# Patient Record
Sex: MALE | Race: WHITE | NOT HISPANIC OR LATINO | Employment: OTHER | URBAN - METROPOLITAN AREA
[De-identification: names, ages, dates, MRNs, and addresses within clinical notes are randomized per-mention and may not be internally consistent; named-entity substitution may affect disease eponyms.]

---

## 2017-01-12 ENCOUNTER — TELEPHONE (OUTPATIENT)
Dept: INTERNAL MEDICINE | Facility: CLINIC | Age: 62
End: 2017-01-12

## 2017-01-12 RX ORDER — ALLOPURINOL 100 MG/1
100 TABLET ORAL DAILY
Qty: 30 TABLET | Refills: 0 | Status: SHIPPED | OUTPATIENT
Start: 2017-01-12 | End: 2017-02-20 | Stop reason: SDUPTHER

## 2017-01-12 RX ORDER — AMLODIPINE BESYLATE 5 MG/1
5 TABLET ORAL DAILY
Qty: 90 TABLET | Refills: 1 | Status: SHIPPED | OUTPATIENT
Start: 2017-01-12 | End: 2017-07-20

## 2017-01-12 NOTE — TELEPHONE ENCOUNTER
----- Message from Anne Kuhn sent at 1/12/2017  2:13 PM EST -----  Contact: ELISABETH SPENCER PH:[phone]  ELISABETH SPENCER CALLING FOR A REFILL FOR ALLOPURINOL 100 MG  HE USES THE [facility]. HE CAN BE REACHED -[phone]

## 2017-02-20 NOTE — TELEPHONE ENCOUNTER
----- Message from Stephanie Gallo sent at 2/20/2017  4:02 PM EST -----  PATIENT WOULD LIKE TO CHANGE FROM VENLOFAXINE EXTENDED RELEASE TO REGULAR      PLEASE CALL PATIENT TO DISCUSS [phone]

## 2017-02-21 RX ORDER — ALLOPURINOL 100 MG/1
100 TABLET ORAL DAILY
Qty: 30 TABLET | Refills: 0 | Status: SHIPPED | OUTPATIENT
Start: 2017-02-21 | End: 2017-07-10 | Stop reason: SDUPTHER

## 2017-02-21 RX ORDER — VENLAFAXINE 75 MG/1
37.5 TABLET ORAL 2 TIMES DAILY
Qty: 30 TABLET | Refills: 0 | Status: SHIPPED | OUTPATIENT
Start: 2017-02-21 | End: 2017-03-10 | Stop reason: SDUPTHER

## 2017-02-21 NOTE — TELEPHONE ENCOUNTER
To be on regular effexor, he would take 1/2 tab BID.  I have sent that in along with allopurinol.  He has appt with [facility] on 3/10 where he can get further refills.

## 2017-02-27 ENCOUNTER — OFFICE VISIT (OUTPATIENT)
Dept: ENDOCRINOLOGY | Facility: CLINIC | Age: 62
End: 2017-02-27

## 2017-02-27 VITALS
OXYGEN SATURATION: 98 % | WEIGHT: 315 LBS | SYSTOLIC BLOOD PRESSURE: 154 MMHG | DIASTOLIC BLOOD PRESSURE: 80 MMHG | HEIGHT: 72 IN | HEART RATE: 57 BPM | BODY MASS INDEX: 42.66 KG/M2

## 2017-02-27 DIAGNOSIS — E11.9 TYPE 2 DIABETES MELLITUS WITHOUT COMPLICATION, WITH LONG-TERM CURRENT USE OF INSULIN (HCC): Primary | ICD-10-CM

## 2017-02-27 DIAGNOSIS — Z79.4 TYPE 2 DIABETES MELLITUS WITHOUT COMPLICATION, WITH LONG-TERM CURRENT USE OF INSULIN (HCC): Primary | ICD-10-CM

## 2017-02-27 DIAGNOSIS — I10 BENIGN ESSENTIAL HYPERTENSION: ICD-10-CM

## 2017-02-27 DIAGNOSIS — E78.2 MIXED HYPERLIPIDEMIA: ICD-10-CM

## 2017-02-27 DIAGNOSIS — E03.9 ACQUIRED HYPOTHYROIDISM: ICD-10-CM

## 2017-02-27 LAB
GLUCOSE BLDC GLUCOMTR-MCNC: 150 MG/DL (ref 70–130)
HBA1C MFR BLD: 6.8 %

## 2017-02-27 PROCEDURE — 82962 GLUCOSE BLOOD TEST: CPT | Performed by: INTERNAL MEDICINE

## 2017-02-27 PROCEDURE — 99214 OFFICE O/P EST MOD 30 MIN: CPT | Performed by: INTERNAL MEDICINE

## 2017-02-27 PROCEDURE — 83036 HEMOGLOBIN GLYCOSYLATED A1C: CPT | Performed by: INTERNAL MEDICINE

## 2017-02-27 NOTE — ASSESSMENT & PLAN NOTE
Blood sugar and 90 day average sugar reviewed  Results for orders placed or performed in visit on 02/27/17   POC Glycosylated Hemoglobin (Hb A1C)   Result Value Ref Range    Hemoglobin A1C 6.8 %   POC Glucose Fingerstick   Result Value Ref Range    Glucose 150 (A) 70 - 130 mg/dL     Is dieting and exercising  Goals reviewed  Is up do date with eye exam  Neuropathy stable- high risk due to fungal nail, deformity feet with callus left medial toe   Adjustment of insulin for lower carb diet discussed  Is on lantus 50 u hs  Discussed sugars - no low sugars  Has lost about 14 lbs and commended this effort   Discussed adjustment of premeal insulin for immediately lower carb meal  Discussed gradual reduction of lantus based on fasting sugar and weight loss  Discussed testing sugar when he feels sweaty   Recheck 3-4 months

## 2017-02-27 NOTE — ASSESSMENT & PLAN NOTE
bp is high- have patient check bp at home and let us know if over 145/85  Is on arb   Has not had medication this morning

## 2017-02-27 NOTE — PROGRESS NOTES
Kevin Aguero 61 y.o.  CC:Follow-up; Diabetes (Type Ii); Hypertension; and Hypothyroidism    Platinum: Follow-up; Diabetes (Type Ii); Hypertension; and Hypothyroidism    Blood sugar and 90 day average sugar reviewed  Results for orders placed or performed in visit on 02/27/17   POC Glycosylated Hemoglobin (Hb A1C)   Result Value Ref Range    Hemoglobin A1C 6.8 %   POC Glucose Fingerstick   Result Value Ref Range    Glucose 150 (A) 70 - 130 mg/dL     He is up to date with eye exam- has f/u scheduled  Ur alb neg 12/16  Blood sugars higher after valentines day  He is moving ahead with plans for bariatric surgery- is on preprocedure diet  Sugars have been lower overall  bp high- recheck 160/78  Eyes more red off and on- injected (irritants from dust in shop)  Neuropathy stable- fungal nail infection (left gt toe callus and bilateral foot deformity)  Ur alb neg 12/16  Is doing much better with diet - has lost about 14 lbs in 2 months  Is exercising more (3 days a week) and is working up to 5 days a week (30-45 min)  Still seeing nephrology and they are monitoring him (sees them at the end of March)  Has PE 3/10 with Dr Gates     Allergies   Allergen Reactions   • Sulfa Antibiotics        Current Outpatient Prescriptions:   •  allopurinol (ZYLOPRIM) 100 MG tablet, Take 1 tablet by mouth Daily. For: Gout, Disp: 30 tablet, Rfl: 0  •  amLODIPine (NORVASC) 5 MG tablet, Take 1 tablet by mouth Daily., Disp: 90 tablet, Rfl: 1  •  buPROPion XL (WELLBUTRIN XL) 300 MG 24 hr tablet, Take 1 tablet by mouth daily. as directed; For: Depression, Disp: , Rfl:   •  BYSTOLIC 10 MG tablet, TAKE 1 TABLET DAILY, Disp: 90 tablet, Rfl: 3  •  Cholecalciferol (VITAMIN D3) 2000 UNITS capsule, Take 1 capsule by mouth 3 (Three) Times a Day., Disp: , Rfl:   •  clotrimazole-betamethasone (LOTRISONE) 1-0.05 % cream, Apply  topically 2 (two) times a day., Disp: 45 g, Rfl: 2  •  coenzyme Q10 100 MG capsule, Take 100 mg by mouth daily., Disp: , Rfl:   •   doxazosin (CARDURA) 2 MG tablet, Take 1 tablet by mouth 2 (Two) Times a Day. For: Hypertension, Disp: 180 tablet, Rfl: 1  •  EDECRIN 25 MG tablet, TAKE 2 TABLETS 2 TIMES DAILY, Disp: 360 tablet, Rfl: 1  •  gabapentin (NEURONTIN) 800 MG tablet, Take 1 tablet by mouth 3 (Three) Times a Day. For: Gout (Patient taking differently: Take 800 mg by mouth 2 (Two) Times a Day. For: Gout), Disp: 270 tablet, Rfl: 1  •  glucose blood (ONE TOUCH ULTRA TEST) test strip, 4 (four) times a day. TEST; For: Diabetes mellitus, Disp: , Rfl:   •  HYDROcodone-acetaminophen (NORCO) 5-325 MG per tablet, , Disp: , Rfl:   •  Insulin Glargine (LANTUS SOLOSTAR) 100 UNIT/ML injection pen, Inject 50-56 Units under the skin Every Night. For: Diabetes mellitus (Patient taking differently: Inject 50-51 Units under the skin Every Night. For: Diabetes mellitus), Disp: 25 pen, Rfl: 1  •  Insulin Lispro (HUMALOG KWIKPEN) 100 UNIT/ML solution pen-injector, Inject 25-30 Units under the skin 2 (Two) Times a Day. For: Diabetes mellitus (Patient taking differently: Inject 10-25 Units under the skin 2 (Two) Times a Day. For: Diabetes mellitus), Disp: 18 pen, Rfl: 0  •  Insulin Pen Needle (B-D ULTRAFINE III SHORT PEN) 31G X 8 MM misc, 4 (four) times a day. USE qid; For: Diabetes mellitus, Disp: , Rfl:   •  levothyroxine (SYNTHROID, LEVOTHROID) 112 MCG tablet, Take 1 tablet by mouth Daily. For: Hypothyroidism, Disp: 90 tablet, Rfl: 1  •  losartan (COZAAR) 100 MG tablet, TAKE 1 TABLET DAILY, Disp: 90 tablet, Rfl: 2  •  Multiple Vitamins-Minerals (CENTRUM SILVER) tablet, Take 1 tablet by mouth daily., Disp: , Rfl:   •  Omega-3 Fatty Acids (FISH OIL) 1000 MG capsule capsule, Take 1 capsule by mouth daily. For: Hyperlipidemia, Disp: , Rfl:   •  ONETOUCH DELICA LANCETS 33G misc, Use for blood sugar testing twice daily; For: Benign essential hypertension, Disp: , Rfl:   •  sildenafil (VIAGRA) 100 MG tablet, Take 1 tablet by mouth daily. 1 hour before needed; For:  Diabetes mellitus, Disp: , Rfl:   •  venlafaxine (EFFEXOR) 75 MG tablet, Take 0.5 tablets by mouth 2 (Two) Times a Day., Disp: 30 tablet, Rfl: 0  Patient Active Problem List    Diagnosis   • Shoulder joint pain [M25.519]   • Callus [L84]   • Left arm pain [M79.602]   • Disease of jaw [M27.9]   • Hematuria [R31.9]   • Hyperkalemia [E87.5]   • Metabolic disorder [E88.9]     Overview Note:     syndrome - renal u/s neg for sofia       • Onychomycosis [B35.1]   • Sciatica [M54.30]   • Benign essential hypertension [I10]     Overview Note:     Impression: 12/21/2015 - increase doxazosin- goal bp 140/80 or less  Impression: 09/10/2015 - bp is good - continue to monitor creatinine  Impression: 02/20/2015 - bp is high- repeat 162/84  more edema  has recently had diuretic dose adjusted  will continue to follow and he will discuss with nephrology if no better by appt with them  Impression: 10/09/2014 - bp is higher- increase amlodipine to bid; Description: edema improved.     • Depression [F32.9]   • Diabetes mellitus [E11.9]     Overview Note:     Impression: 12/21/2015 - reviewed hgn a1c with him   add 4 u of humalog if blood sugar premeal is over 200   samples and refills provided  Impression: 09/10/2015 - blood sugar 185, hgn a1c 7.7%  is up to date with preventive care  goals reviewed and emphasis on better fitness overall, building a regimen of regular exercise  he will work on this  continue f/u with nephrology  Impression: 05/28/2015 - blood sugar is 213, hgn a1c 8.7%  continue current medications  checking sugars bid  is adjusting insulin for higher sugars  did not bring meter  discussed diet, weight loss, referral for compulsive eating to [facility]  update lab work   has f/u podiatry and nephrology  will schedule updated eye exam  Impression: 02/20/2015 - blood sugar 89, hgn a1c 7.0 (in acceptable range).  continue to try to increase activity and work on weight loss  biggest risk is morbid obesity  is up to date with eye  exam  nephrology appt coming up  has vascular changes feet- preulcerative callus left gt toe medially.  Cautioned him about proper foot care and use of lotion and pumice stone  Impression: 10/09/2014 - reviewed hgn a1c   doing well with diet, working on weight loss surgery  is up to date with eye exam  no neuropathy  ur alb pos- sees nephrology  foot care good- saw podiatry 2 weeks ago  Impression: 07/09/2014 - blood sugar is 113, hgn a1c 7.8% (average 170 or so, down from last a1c 8.3%)  commended efforts with diet and weight loss, continue to work on losing more weight (target 30-40 lbs)  strategies for diet, exercise reviewed and download of glucometer discussed  planning to continue to work with dietician  Impression: 04/25/2014 - blood sugar 148, hgn a1c 8.3% (average 190 or so)  discussed current regimen and will transition him to lantus + humalog for better flexibility with insulin doses  start lantus 40 u daily and humalog 1 u per 8 gm carb  grids provided and have asked him to email sugars in 1-2 weeks and gave information re insulin pump;      • Diabetic retinopathy [E11.319]     Overview Note:     Description: 10/15 worsening - [place] referred to [facility]     • Edema [R60.9]   • Gout [M10.9]   • Hyperlipidemia [E78.5]     Overview Note:     Impression: 12/21/2015 - reviewed flp - Dr Gates increased statin   more achy- discussed adding co q 10  Impression: 09/10/2015 - reviewed recent lipid panel  no changes for now  see above  Impression: 05/28/2015 - check flp  Impression: 02/20/2015 - high tg- continue current medication and repeat fasting with physical  Impression: 10/09/2014 - reviewed flp;      • Hypothyroidism [E03.9]     Overview Note:     Impression: 12/21/2015 - check tfts - gave order  Impression: 09/10/2015 - tsh high normal  Impression: 05/28/2015 - check tsh  Impression: 02/20/2015 - he went off thyroid supplement ( was taking it at breakfast and then stopped because he was told he should  take it before the meal).  Reinforced need for him to take this daily.  He will resume and update blood work when he has appt with Dr Gates.  Impression: 10/09/2014 - check tft;      • Morbid obesity [E66.01]     Overview Note:     Impression: 04/25/2014 - over 30 min counselling- discussed weight loss surgery vs diet, will try tracking calories with my fitness pal;      • Osteoarthritis [M19.90]     Overview Note:     Impression: 09/10/2015 - discussed weight loss;      • Renal insufficiency [N28.9]     Overview Note:     Impression: 09/10/2015 - continue f/u nephrology;      • Sleep apnea [G47.30]   • Cobalamin deficiency [E53.8]     Review of Systems   Constitutional: Negative for activity change, appetite change, chills, diaphoresis, fatigue, fever and unexpected weight change.   HENT: Negative for congestion, dental problem, drooling, ear discharge, ear pain, facial swelling, hearing loss, mouth sores, nosebleeds, postnasal drip, rhinorrhea, sinus pressure, sneezing, sore throat, tinnitus, trouble swallowing and voice change.    Eyes: Negative for photophobia, pain, discharge, redness, itching and visual disturbance.   Respiratory: Negative for apnea, cough, choking, chest tightness, shortness of breath, wheezing and stridor.    Cardiovascular: Negative for chest pain, palpitations and leg swelling.   Gastrointestinal: Negative for abdominal distention, abdominal pain, anal bleeding, blood in stool, constipation, diarrhea, nausea, rectal pain and vomiting.   Endocrine: Negative for cold intolerance, heat intolerance, polydipsia, polyphagia and polyuria.   Genitourinary: Negative for decreased urine volume, difficulty urinating, dysuria, enuresis, flank pain, frequency, genital sores, hematuria and urgency.   Musculoskeletal: Negative for arthralgias, back pain, gait problem, joint swelling, myalgias, neck pain and neck stiffness.   Skin: Negative for color change, pallor, rash and wound.  "  Allergic/Immunologic: Negative for environmental allergies, food allergies and immunocompromised state.   Neurological: Negative for dizziness, tremors, seizures, syncope, facial asymmetry, speech difficulty, weakness, light-headedness, numbness and headaches.   Hematological: Negative for adenopathy. Does not bruise/bleed easily.   Psychiatric/Behavioral: Negative for agitation, behavioral problems, confusion, decreased concentration, dysphoric mood, hallucinations, self-injury, sleep disturbance and suicidal ideas. The patient is not nervous/anxious and is not hyperactive.      Social History     Social History   • Marital status:      Spouse name: N/A   • Number of children: N/A   • Years of education: N/A     Occupational History   • Not on file.     Social History Main Topics   • Smoking status: Never Smoker   • Smokeless tobacco: Not on file   • Alcohol use 0.6 oz/week     1 Cans of beer per week      Comment: 2 times per month   • Drug use: No   • Sexual activity: Defer     Other Topics Concern   • Not on file     Social History Narrative     Family History   Problem Relation Age of Onset   • No Known Problems Mother    • Diabetes Father    • Hypertension Father    • Cancer Father      Visit Vitals   • /80   • Pulse 57   • Ht 72\" (182.9 cm)   • Wt (!) 360 lb (163 kg)   • SpO2 98%   • BMI 48.82 kg/m2     Physical Exam   Constitutional: He is oriented to person, place, and time. He appears well-developed and well-nourished.   HENT:   Head: Normocephalic and atraumatic.   Nose: Nose normal.   Mouth/Throat: Oropharynx is clear and moist.   Eyes: Conjunctivae, EOM and lids are normal. Pupils are equal, round, and reactive to light.   Neck: Trachea normal and normal range of motion. Neck supple. Carotid bruit is not present. No tracheal deviation present. No thyroid mass and no thyromegaly present.   Cardiovascular: Normal rate, regular rhythm, normal heart sounds and intact distal pulses.  Exam " reveals no gallop and no friction rub.    No murmur heard.  Pulmonary/Chest: Effort normal and breath sounds normal. No respiratory distress. He has no wheezes.   Musculoskeletal: Normal range of motion. He exhibits no edema or deformity.    Diabetic foot exam performed: left medial gt toe callus with fungal nail infection, foot deformity.    Neurological Sensory Findings - Altered hot/cold right ankle/foot discrimination and altered hot/cold left ankle/foot discrimination. Altered sharp/dull right ankle/foot discrimination and altered sharp/dull left ankle/foot discrimination. Right ankle/foot altered proprioception and left ankle/foot altered proprioception.    Vascular Status -  His exam exhibits right foot vasculature normal. His exam exhibits no right foot edema. His exam exhibits left foot vasculature normal. His exam exhibits no left foot edema.   Skin Integrity  -  His right foot skin is intact.     Kevin 's left foot skin is intact. .  Lymphadenopathy:     He has no cervical adenopathy.   Neurological: He is alert and oriented to person, place, and time. He has normal reflexes. He displays normal reflexes. No cranial nerve deficit.   Skin: Skin is warm and dry. No rash noted. No cyanosis or erythema. Nails show no clubbing.   Psychiatric: He has a normal mood and affect. His speech is normal and behavior is normal. Judgment and thought content normal. Cognition and memory are normal.   Nursing note and vitals reviewed.    Results for orders placed or performed in visit on 11/14/16   POC Glucose Fingerstick   Result Value Ref Range    Glucose 182 (A) 70 - 130 mg/dL   POC Glycated Hemoglobin, Total   Result Value Ref Range    Hemoglobin A1C 7.6 %    Lot Number [id number]     Expiration Date 6/2018    POC Microalbumin   Result Value Ref Range    Microalbumin, Urine 150     Creatinine, Urine 300      Problem List Items Addressed This Visit        Cardiovascular and Mediastinum    Benign essential hypertension      bp is high- have patient check bp at home and let us know if over 145/85  Is on arb   Has not had medication this morning          Hyperlipidemia     Check flp - on simvastatin 40 mg daily- add to med list  Goals reviewed         Relevant Orders    Lipid Panel       Endocrine    Diabetes mellitus - Primary     Blood sugar and 90 day average sugar reviewed  Results for orders placed or performed in visit on 02/27/17   POC Glycosylated Hemoglobin (Hb A1C)   Result Value Ref Range    Hemoglobin A1C 6.8 %   POC Glucose Fingerstick   Result Value Ref Range    Glucose 150 (A) 70 - 130 mg/dL     Is dieting and exercising  Goals reviewed  Is up do date with eye exam  Neuropathy stable- high risk due to fungal nail, deformity feet with callus left medial toe   Adjustment of insulin for lower carb diet discussed  Is on lantus 50 u hs  Discussed sugars - no low sugars  Has lost about 14 lbs and commended this effort   Discussed adjustment of premeal insulin for immediately lower carb meal  Discussed gradual reduction of lantus based on fasting sugar and weight loss  Discussed testing sugar when he feels sweaty   Recheck 3-4 months          Relevant Orders    POC Glycosylated Hemoglobin (Hb A1C) (Completed)    POC Glucose Fingerstick (Completed)    Microalbumin / Creatinine Urine Ratio    Comprehensive Metabolic Panel    CBC & Differential    Hypothyroidism     Check tfts          Relevant Orders    TSH        Return in about 4 months (around 6/27/2017) for Recheck 30 min .    Valentina Rodriguez MA

## 2017-03-02 RX ORDER — SIMVASTATIN 40 MG
40 TABLET ORAL EVERY EVENING
Qty: 30 TABLET | Refills: 11
Start: 2017-03-02 | End: 2017-10-19 | Stop reason: SDUPTHER

## 2017-03-10 ENCOUNTER — OFFICE VISIT (OUTPATIENT)
Dept: INTERNAL MEDICINE | Facility: CLINIC | Age: 62
End: 2017-03-10

## 2017-03-10 VITALS
BODY MASS INDEX: 48.96 KG/M2 | HEART RATE: 70 BPM | SYSTOLIC BLOOD PRESSURE: 158 MMHG | DIASTOLIC BLOOD PRESSURE: 88 MMHG | RESPIRATION RATE: 19 BRPM | WEIGHT: 315 LBS

## 2017-03-10 DIAGNOSIS — E11.9 TYPE 2 DIABETES MELLITUS WITHOUT COMPLICATION, WITH LONG-TERM CURRENT USE OF INSULIN (HCC): ICD-10-CM

## 2017-03-10 DIAGNOSIS — Z12.5 SCREENING FOR PROSTATE CANCER: ICD-10-CM

## 2017-03-10 DIAGNOSIS — F32.9 REACTIVE DEPRESSION: ICD-10-CM

## 2017-03-10 DIAGNOSIS — N52.9 ERECTILE DYSFUNCTION OF ORGANIC ORIGIN: ICD-10-CM

## 2017-03-10 DIAGNOSIS — E78.2 MIXED HYPERLIPIDEMIA: ICD-10-CM

## 2017-03-10 DIAGNOSIS — N28.9 RENAL INSUFFICIENCY: ICD-10-CM

## 2017-03-10 DIAGNOSIS — I10 BENIGN ESSENTIAL HYPERTENSION: Primary | ICD-10-CM

## 2017-03-10 DIAGNOSIS — E66.01 MORBID OBESITY DUE TO EXCESS CALORIES (HCC): ICD-10-CM

## 2017-03-10 DIAGNOSIS — Z79.4 TYPE 2 DIABETES MELLITUS WITHOUT COMPLICATION, WITH LONG-TERM CURRENT USE OF INSULIN (HCC): ICD-10-CM

## 2017-03-10 PROCEDURE — 99396 PREV VISIT EST AGE 40-64: CPT | Performed by: INTERNAL MEDICINE

## 2017-03-10 RX ORDER — VENLAFAXINE 75 MG/1
37.5 TABLET ORAL 2 TIMES DAILY
Qty: 90 TABLET | Refills: 3 | Status: SHIPPED | OUTPATIENT
Start: 2017-03-10 | End: 2017-10-20 | Stop reason: SDUPTHER

## 2017-03-10 RX ORDER — BUPROPION HYDROCHLORIDE 100 MG/1
100 TABLET ORAL 3 TIMES DAILY
Qty: 270 TABLET | Refills: 3 | Status: SHIPPED | OUTPATIENT
Start: 2017-03-10 | End: 2018-04-17 | Stop reason: SDUPTHER

## 2017-03-10 NOTE — PROGRESS NOTES
David Aguero is a 61 y.o. male.     History of Present Illness   For annual physical and follow up of  IDDM followed by endo and last A1C = 6.8.  HTN on meds no chest pain sob or headache.  Renal insufficiency with creatinine about 3 followed by nephrology.  Hypothyroid on replacement no sx.  Obesity is no change and he is considering surgery.    The following portions of the patient's history were reviewed and updated as appropriate: allergies, current medications, past family history, past medical history, past social history, past surgical history and problem list.    Review of Systems   Constitutional: Negative for activity change, appetite change, fatigue and fever.   HENT: Negative for dental problem, ear pain, hearing loss, postnasal drip, rhinorrhea, sinus pressure, sore throat, trouble swallowing and voice change.    Eyes: Negative.    Respiratory: Negative for cough, chest tightness, shortness of breath and wheezing.    Cardiovascular: Negative for chest pain and leg swelling.   Gastrointestinal: Negative for abdominal distention, abdominal pain, blood in stool, constipation, diarrhea and nausea.   Endocrine: Negative for polydipsia and polyuria.   Genitourinary: Negative for decreased urine volume, dysuria, hematuria and urgency.   Musculoskeletal: Positive for arthralgias. Negative for back pain and neck pain.   Skin: Negative for pallor and rash.   Allergic/Immunologic: Negative.    Neurological: Negative for dizziness, tremors, speech difficulty, weakness, numbness and headaches.   Hematological: Negative for adenopathy.   Psychiatric/Behavioral: Negative for agitation, behavioral problems, confusion, dysphoric mood and sleep disturbance. The patient is not nervous/anxious and is not hyperactive.        Objective   Physical Exam   Constitutional: He is oriented to person, place, and time. He appears well-developed and well-nourished.   Morbidly obese.   HENT:   Head: Normocephalic and  atraumatic.   Right Ear: External ear normal.   Left Ear: External ear normal.   Nose: Nose normal.   Mouth/Throat: Oropharynx is clear and moist.   Eyes: Conjunctivae and EOM are normal. Pupils are equal, round, and reactive to light.   Fundoscopic exam:       The right eye shows no AV nicking and no hemorrhage.        The left eye shows no AV nicking and no hemorrhage.   Neck: Normal range of motion. Neck supple. No thyromegaly present.   Cardiovascular: Normal rate, regular rhythm, normal heart sounds and intact distal pulses.    No murmur heard.  Carotids normal.   Pulmonary/Chest: Effort normal and breath sounds normal.   Abdominal: Soft. Bowel sounds are normal. He exhibits no distension and no mass. There is no tenderness. No hernia.   Genitourinary: Rectum normal, prostate normal, testes normal and penis normal.   Musculoskeletal: Normal range of motion.   Lymphadenopathy:     He has no cervical adenopathy.   Neurological: He is alert and oriented to person, place, and time. He has normal reflexes. No cranial nerve deficit.   Skin: Skin is warm and dry.   Psychiatric: He has a normal mood and affect. His behavior is normal. Judgment and thought content normal.   Nursing note and vitals reviewed.      Assessment/Plan   Kevin was seen today for hyperlipidemia.    Diagnoses and all orders for this visit:    Benign essential hypertension    Mixed hyperlipidemia    Morbid obesity due to excess calories    Type 2 diabetes mellitus without complication, with long-term current use of insulin    Renal insufficiency    Reactive depression  -     venlafaxine (EFFEXOR) 75 MG tablet; Take 0.5 tablets by mouth 2 (Two) Times a Day.  -     buPROPion (WELLBUTRIN) 100 MG tablet; Take 1 tablet by mouth 3 (Three) Times a Day.    Screening for prostate cancer  -     PSA    Erectile dysfunction of organic origin  -     Ambulatory Referral to Urology    Will check PSA had rest of labs recently via endo.  All problems are  stable.  Encouraged weight loss surgery.  Counseled on immunizations and weight.  Same meds.  Recheck 6 months.

## 2017-04-10 ENCOUNTER — TELEPHONE (OUTPATIENT)
Dept: INTERNAL MEDICINE | Facility: CLINIC | Age: 62
End: 2017-04-10

## 2017-04-10 DIAGNOSIS — E66.9 OBESITY (BMI 30.0-34.9): Primary | ICD-10-CM

## 2017-04-10 DIAGNOSIS — IMO0001 DIABETES MELLITUS, INSULIN DEPENDENT (IDDM), CONTROLLED: ICD-10-CM

## 2017-04-10 NOTE — TELEPHONE ENCOUNTER
----- Message from Quinton Pollock sent at 4/10/2017  1:45 PM EDT -----  PT WAS SEEN BY OUTPATIENT NUTRITION 2 YEARS AGO, AND IS WANTING TO BE SEEN THERE AGAIN, BUT THEY'RE NEEDING AN UPDATED REFERRAL.  [phone]

## 2017-04-17 ENCOUNTER — TELEPHONE (OUTPATIENT)
Dept: INTERNAL MEDICINE | Facility: CLINIC | Age: 62
End: 2017-04-17

## 2017-04-17 RX ORDER — GABAPENTIN 800 MG/1
800 TABLET ORAL 3 TIMES DAILY
Qty: 270 TABLET | Refills: 1 | Status: SHIPPED | OUTPATIENT
Start: 2017-04-17 | End: 2018-01-18 | Stop reason: SDUPTHER

## 2017-04-17 NOTE — TELEPHONE ENCOUNTER
----- Message from Anne Kuhn sent at 4/17/2017  1:15 PM EDT -----  Contact: ELISABETH SPENCER CALLING FOR A REFILL FOR GABAPENTIN. HE USES THE [facility], HE WOULD LIKE THIS FOR A 3 MONTH SUPPLY. HE CAN BE REACHED -[phone]

## 2017-04-27 ENCOUNTER — APPOINTMENT (OUTPATIENT)
Dept: NUTRITION | Facility: HOSPITAL | Age: 62
End: 2017-04-27

## 2017-04-28 RX ORDER — DOXAZOSIN 2 MG/1
TABLET ORAL
Qty: 180 TABLET | Refills: 1 | Status: SHIPPED | OUTPATIENT
Start: 2017-04-28 | End: 2017-12-19 | Stop reason: SDUPTHER

## 2017-04-28 RX ORDER — ETHACRYNIC ACID 25 MG/1
TABLET ORAL
Qty: 360 TABLET | Refills: 1 | Status: SHIPPED | OUTPATIENT
Start: 2017-04-28 | End: 2017-05-10 | Stop reason: SDUPTHER

## 2017-05-10 ENCOUNTER — TELEPHONE (OUTPATIENT)
Dept: INTERNAL MEDICINE | Facility: CLINIC | Age: 62
End: 2017-05-10

## 2017-05-10 RX ORDER — ETHACRYNIC ACID 25 MG/1
50 TABLET ORAL 2 TIMES DAILY
Qty: 360 TABLET | Refills: 1 | Status: SHIPPED | OUTPATIENT
Start: 2017-05-10 | End: 2017-07-20

## 2017-06-20 ENCOUNTER — TELEPHONE (OUTPATIENT)
Dept: INTERNAL MEDICINE | Facility: CLINIC | Age: 62
End: 2017-06-20

## 2017-06-20 NOTE — TELEPHONE ENCOUNTER
SHAY,    MR SPENCER NEEDS TO R/S HIS APPT WITH DR ROJO. HE WILL BE HAVING BARIATRIC SURGERY ON 6/26 AND WILL NEED SOMETHING SEVERAL WEEKS FROM THAT DATE.    CALL BACK #[phone]

## 2017-07-10 ENCOUNTER — TELEPHONE (OUTPATIENT)
Dept: INTERNAL MEDICINE | Facility: CLINIC | Age: 62
End: 2017-07-10

## 2017-07-10 RX ORDER — ALLOPURINOL 100 MG/1
100 TABLET ORAL DAILY
Qty: 30 TABLET | Refills: 0 | Status: SHIPPED | OUTPATIENT
Start: 2017-07-10 | End: 2017-08-02 | Stop reason: SDUPTHER

## 2017-07-10 RX ORDER — LEVOTHYROXINE SODIUM 112 UG/1
112 TABLET ORAL DAILY
Qty: 90 TABLET | Refills: 1 | Status: SHIPPED | OUTPATIENT
Start: 2017-07-10 | End: 2017-10-02 | Stop reason: SDUPTHER

## 2017-07-10 NOTE — TELEPHONE ENCOUNTER
----- Message from Chayito Echeverria sent at 7/10/2017  2:19 PM EDT -----  PT CALLED NEEDING REFILL ON RX allopurinol     HE CAN BE REACHED -[phone]    [facility]Y- [facility]

## 2017-07-20 ENCOUNTER — OFFICE VISIT (OUTPATIENT)
Dept: INTERNAL MEDICINE | Facility: CLINIC | Age: 62
End: 2017-07-20

## 2017-07-20 VITALS
HEART RATE: 68 BPM | WEIGHT: 315 LBS | SYSTOLIC BLOOD PRESSURE: 156 MMHG | RESPIRATION RATE: 20 BRPM | BODY MASS INDEX: 42.99 KG/M2 | DIASTOLIC BLOOD PRESSURE: 80 MMHG

## 2017-07-20 DIAGNOSIS — J02.9 ACUTE PHARYNGITIS, UNSPECIFIED ETIOLOGY: Primary | ICD-10-CM

## 2017-07-20 LAB
EXPIRATION DATE: NORMAL
INTERNAL CONTROL: NORMAL
Lab: NORMAL
S PYO AG THROAT QL: NEGATIVE

## 2017-07-20 PROCEDURE — 87880 STREP A ASSAY W/OPTIC: CPT | Performed by: INTERNAL MEDICINE

## 2017-07-20 PROCEDURE — 99213 OFFICE O/P EST LOW 20 MIN: CPT | Performed by: INTERNAL MEDICINE

## 2017-07-20 NOTE — PROGRESS NOTES
Subjective   Kevin Aguero is a 62 y.o. male.     History of Present Illness   Has had a sore throat for 3 or 4 days.  Seems to be on top of left side of throat.  No fever, cough or other sx.      The following portions of the patient's history were reviewed and updated as appropriate: allergies, current medications, past medical history and problem list.    Review of Systems   Constitutional: Negative.    HENT: Positive for sore throat. Negative for congestion, ear pain, facial swelling and sneezing.    Eyes: Negative.    Respiratory: Negative.  Negative for cough, shortness of breath and wheezing.    Cardiovascular: Negative.  Negative for chest pain.   Gastrointestinal: Negative.        Objective   Physical Exam   Constitutional: He appears well-developed and well-nourished.   HENT:   Mouth/Throat: Oropharynx is clear and moist.   Throat and tonsil look normal.   Pulmonary/Chest: Effort normal and breath sounds normal. He has no wheezes. He has no rales.   Lymphadenopathy:     He has no cervical adenopathy.   Nursing note and vitals reviewed.      Assessment/Plan   Kevin was seen today for pain in mouth.    Diagnoses and all orders for this visit:    Acute pharyngitis, unspecified etiology  -     POC Rapid Strep A    Negative.  Told him to gargle with cepacol or salt water.  ASA or tylenol prn.  If no better or worse other sx he will call.

## 2017-08-02 RX ORDER — ALLOPURINOL 100 MG/1
100 TABLET ORAL DAILY
Qty: 30 TABLET | Refills: 2 | Status: SHIPPED | OUTPATIENT
Start: 2017-08-02 | End: 2017-09-14 | Stop reason: SDUPTHER

## 2017-08-02 RX ORDER — ALLOPURINOL 100 MG/1
TABLET ORAL
Qty: 30 TABLET | Refills: 0 | Status: CANCELLED | OUTPATIENT
Start: 2017-08-02

## 2017-08-03 ENCOUNTER — TELEPHONE (OUTPATIENT)
Dept: INTERNAL MEDICINE | Facility: CLINIC | Age: 62
End: 2017-08-03

## 2017-08-21 ENCOUNTER — OFFICE VISIT (OUTPATIENT)
Dept: ENDOCRINOLOGY | Facility: CLINIC | Age: 62
End: 2017-08-21

## 2017-08-21 VITALS
OXYGEN SATURATION: 98 % | HEART RATE: 60 BPM | HEIGHT: 72 IN | SYSTOLIC BLOOD PRESSURE: 138 MMHG | BODY MASS INDEX: 40.23 KG/M2 | DIASTOLIC BLOOD PRESSURE: 64 MMHG | WEIGHT: 297 LBS

## 2017-08-21 DIAGNOSIS — N28.9 RENAL INSUFFICIENCY: ICD-10-CM

## 2017-08-21 DIAGNOSIS — E03.9 ACQUIRED HYPOTHYROIDISM: ICD-10-CM

## 2017-08-21 DIAGNOSIS — E11.9 TYPE 2 DIABETES MELLITUS WITHOUT COMPLICATION, WITH LONG-TERM CURRENT USE OF INSULIN (HCC): Primary | ICD-10-CM

## 2017-08-21 DIAGNOSIS — E78.2 MIXED HYPERLIPIDEMIA: ICD-10-CM

## 2017-08-21 DIAGNOSIS — I10 BENIGN ESSENTIAL HYPERTENSION: ICD-10-CM

## 2017-08-21 DIAGNOSIS — Z79.4 TYPE 2 DIABETES MELLITUS WITHOUT COMPLICATION, WITH LONG-TERM CURRENT USE OF INSULIN (HCC): Primary | ICD-10-CM

## 2017-08-21 LAB
GLUCOSE BLDC GLUCOMTR-MCNC: 193 MG/DL (ref 70–130)
HBA1C MFR BLD: 7.3 %

## 2017-08-21 PROCEDURE — 82947 ASSAY GLUCOSE BLOOD QUANT: CPT | Performed by: INTERNAL MEDICINE

## 2017-08-21 PROCEDURE — 99214 OFFICE O/P EST MOD 30 MIN: CPT | Performed by: INTERNAL MEDICINE

## 2017-08-21 PROCEDURE — 83036 HEMOGLOBIN GLYCOSYLATED A1C: CPT | Performed by: INTERNAL MEDICINE

## 2017-08-21 NOTE — PROGRESS NOTES
Kevin Aguero 62 y.o.  CC:Follow-up; Diabetes (Type II, last eye exam one week ago by Dr Welch at [facility]); Hypertension; Hypothyroidism; and Weight Loss (had baratric surgery in June  has lost 60 lbs)    [place]: Follow-up; Diabetes (Type II, last eye exam one week ago by Dr Welch at [facility]); Hypertension; Hypothyroidism; and Weight Loss (had baratric surgery in June  has lost 60 lbs)    Blood sugar and 90 day average sugar reviewed  Blood sugar 193 and hgn a1c 7.3%  Discussed current sugar - is not fasting  Had bariatric surgery - is drinking protein shakes  Has lost about 60 lbs  Off insulin currently - sugar running in the 150s  No low blood sugar   Had surgery 6/26/17  Has done well   More mobile   He is walking and swimming for exercise  bp is good   Retinal exam improved, some cataracts- seeing Dr Welch and Dr Lyon    Allergies   Allergen Reactions   • Sulfa Antibiotics        Current Outpatient Prescriptions:   •  albuterol (PROVENTIL HFA;VENTOLIN HFA) 108 (90 BASE) MCG/ACT inhaler, Inhale 2 puffs Every 6 (Six) Hours As Needed for Wheezing., Disp: 1 inhaler, Rfl: 0  •  allopurinol (ZYLOPRIM) 100 MG tablet, Take 1 tablet by mouth Daily. For: Gout, Disp: 30 tablet, Rfl: 2  •  buPROPion (WELLBUTRIN) 100 MG tablet, Take 1 tablet by mouth 3 (Three) Times a Day., Disp: 270 tablet, Rfl: 3  •  BYSTOLIC 10 MG tablet, TAKE 1 TABLET DAILY, Disp: 90 tablet, Rfl: 3  •  gabapentin (NEURONTIN) 800 MG tablet, Take 1 tablet by mouth 3 (Three) Times a Day. For: Gout, Disp: 270 tablet, Rfl: 1  •  HYDROcodone-acetaminophen (NORCO) 5-325 MG per tablet, , Disp: , Rfl:   •  levothyroxine (SYNTHROID, LEVOTHROID) 112 MCG tablet, Take 1 tablet by mouth Daily. For: Hypothyroidism, Disp: 90 tablet, Rfl: 1  •  losartan (COZAAR) 100 MG tablet, TAKE 1 TABLET DAILY, Disp: 90 tablet, Rfl: 2  •  simvastatin (ZOCOR) 40 MG tablet, Take 1 tablet by mouth Every Evening., Disp: 30 tablet, Rfl: 11  •  venlafaxine  (EFFEXOR) 75 MG tablet, Take 0.5 tablets by mouth 2 (Two) Times a Day., Disp: 90 tablet, Rfl: 3  •  clotrimazole-betamethasone (LOTRISONE) 1-0.05 % cream, Apply  topically 2 (two) times a day., Disp: 45 g, Rfl: 2  •  doxazosin (CARDURA) 2 MG tablet, TAKE 1 TABLET TWICE A DAY (Patient taking differently: Take one tablet q HS), Disp: 180 tablet, Rfl: 1  •  glucose blood (ONE TOUCH ULTRA TEST) test strip, 4 (four) times a day. TEST; For: Diabetes mellitus, Disp: , Rfl:   •  Insulin Pen Needle (B-D ULTRAFINE III SHORT PEN) 31G X 8 MM misc, 4 (four) times a day. USE qid; For: Diabetes mellitus, Disp: , Rfl:   •  ONETOUCH DELICA LANCETS 33G misc, Use for blood sugar testing twice daily; For: Benign essential hypertension, Disp: , Rfl:   •  sildenafil (VIAGRA) 100 MG tablet, Take 1 tablet by mouth daily. 1 hour before needed; For: Diabetes mellitus, Disp: , Rfl:   Patient Active Problem List    Diagnosis   • Shoulder joint pain [M25.519]   • Callus [L84]   • Left arm pain [M79.602]   • Disease of jaw [M27.9]   • Hematuria [R31.9]   • Hyperkalemia [E87.5]   • Metabolic disorder [E88.9]     Overview Note:     syndrome - renal u/s neg for sofia       • Onychomycosis [B35.1]   • Sciatica [M54.30]   • Benign essential hypertension [I10]     Overview Note:     Impression: 12/21/2015 - increase doxazosin- goal bp 140/80 or less  Impression: 09/10/2015 - bp is good - continue to monitor creatinine  Impression: 02/20/2015 - bp is high- repeat 162/84  more edema  has recently had diuretic dose adjusted  will continue to follow and he will discuss with nephrology if no better by appt with them  Impression: 10/09/2014 - bp is higher- increase amlodipine to bid; Description: edema improved.     • Reactive depression [F32.9]   • Type 2 diabetes mellitus without complication, with long-term current use of insulin [E11.9, Z79.4]     Overview Note:     Impression: 12/21/2015 - reviewed hgn a1c with him   add 4 u of humalog if blood sugar  premeal is over 200   samples and refills provided  Impression: 09/10/2015 - blood sugar 185, hgn a1c 7.7%  is up to date with preventive care  goals reviewed and emphasis on better fitness overall, building a regimen of regular exercise  he will work on this  continue f/u with nephrology  Impression: 05/28/2015 - blood sugar is 213, hgn a1c 8.7%  continue current medications  checking sugars bid  is adjusting insulin for higher sugars  did not bring meter  discussed diet, weight loss, referral for compulsive eating to [facility]  update lab work   has f/u podiatry and nephrology  will schedule updated eye exam  Impression: 02/20/2015 - blood sugar 89, hgn a1c 7.0 (in acceptable range).  continue to try to increase activity and work on weight loss  biggest risk is morbid obesity  is up to date with eye exam  nephrology appt coming up  has vascular changes feet- preulcerative callus left gt toe medially.  Cautioned him about proper foot care and use of lotion and pumice stone  Impression: 10/09/2014 - reviewed hgn a1c   doing well with diet, working on weight loss surgery  is up to date with eye exam  no neuropathy  ur alb pos- sees nephrology  foot care good- saw podiatry 2 weeks ago  Impression: 07/09/2014 - blood sugar is 113, hgn a1c 7.8% (average 170 or so, down from last a1c 8.3%)  commended efforts with diet and weight loss, continue to work on losing more weight (target 30-40 lbs)  strategies for diet, exercise reviewed and download of glucometer discussed  planning to continue to work with dietician  Impression: 04/25/2014 - blood sugar 148, hgn a1c 8.3% (average 190 or so)  discussed current regimen and will transition him to lantus + humalog for better flexibility with insulin doses  start lantus 40 u daily and humalog 1 u per 8 gm carb  grids provided and have asked him to email sugars in 1-2 weeks and gave information re insulin pump;      • Diabetic retinopathy [E11.319]     Overview Note:     Description:  10/15 worsening - Lyon referred to [facility]     • Edema [R60.9]   • Gout [M10.9]   • Mixed hyperlipidemia [E78.2]     Overview Note:     Impression: 12/21/2015 - reviewed flp - Dr Gates increased statin   more achy- discussed adding co q 10  Impression: 09/10/2015 - reviewed recent lipid panel  no changes for now  see above  Impression: 05/28/2015 - check flp  Impression: 02/20/2015 - high tg- continue current medication and repeat fasting with physical  Impression: 10/09/2014 - reviewed flp;      • Acquired hypothyroidism [E03.9]     Overview Note:     Impression: 12/21/2015 - check tfts - gave order  Impression: 09/10/2015 - tsh high normal  Impression: 05/28/2015 - check tsh  Impression: 02/20/2015 - he went off thyroid supplement ( was taking it at breakfast and then stopped because he was told he should take it before the meal).  Reinforced need for him to take this daily.  He will resume and update blood work when he has appt with Dr Gates.  Impression: 10/09/2014 - check tft;      • Morbid obesity due to excess calories [E66.01]     Overview Note:     Impression: 04/25/2014 - over 30 min counselling- discussed weight loss surgery vs diet, will try tracking calories with my fitness pal;      • Osteoarthritis [M19.90]     Overview Note:     Impression: 09/10/2015 - discussed weight loss;      • Renal insufficiency [N28.9]     Overview Note:     Impression: 09/10/2015 - continue f/u nephrology;      • Sleep apnea [G47.30]   • Cobalamin deficiency [E53.8]     Review of Systems   Constitutional: Positive for unexpected weight change. Negative for activity change, appetite change, chills, diaphoresis, fatigue and fever.   HENT: Negative for congestion, dental problem, drooling, ear discharge, ear pain, facial swelling, hearing loss, mouth sores, nosebleeds, postnasal drip, rhinorrhea, sinus pressure, sneezing, sore throat, tinnitus, trouble swallowing and voice change.    Eyes: Negative for photophobia, pain,  discharge, redness, itching and visual disturbance.   Respiratory: Negative for apnea, cough, choking, chest tightness, shortness of breath, wheezing and stridor.    Cardiovascular: Negative for chest pain, palpitations and leg swelling.   Gastrointestinal: Negative for abdominal distention, abdominal pain, anal bleeding, blood in stool, constipation, diarrhea, nausea, rectal pain and vomiting.   Endocrine: Negative for cold intolerance, heat intolerance, polydipsia, polyphagia and polyuria.   Genitourinary: Negative for decreased urine volume, difficulty urinating, dysuria, enuresis, flank pain, frequency, genital sores, hematuria and urgency.   Musculoskeletal: Negative for arthralgias, back pain, gait problem, joint swelling, myalgias, neck pain and neck stiffness.   Skin: Negative for color change, pallor, rash and wound.   Allergic/Immunologic: Negative for environmental allergies, food allergies and immunocompromised state.   Neurological: Negative for dizziness, tremors, seizures, syncope, facial asymmetry, speech difficulty, weakness, light-headedness, numbness and headaches.   Hematological: Negative for adenopathy. Does not bruise/bleed easily.   Psychiatric/Behavioral: Negative for agitation, behavioral problems, confusion, decreased concentration, dysphoric mood, hallucinations, self-injury, sleep disturbance and suicidal ideas. The patient is not nervous/anxious and is not hyperactive.      Social History     Social History   • Marital status:      Spouse name: N/A   • Number of children: N/A   • Years of education: N/A     Occupational History   • Not on file.     Social History Main Topics   • Smoking status: Never Smoker   • Smokeless tobacco: Not on file   • Alcohol use 0.6 oz/week     1 Cans of beer per week      Comment: 2 times per month   • Drug use: No   • Sexual activity: Defer     Other Topics Concern   • Not on file     Social History Narrative     Family History   Problem Relation  "Age of Onset   • No Known Problems Mother    • Diabetes Father    • Hypertension Father    • Cancer Father      /62  Pulse 60  Ht 72\" (182.9 cm)  Wt 297 lb (135 kg)  SpO2 98%  BMI 40.28 kg/m2  Physical Exam   Constitutional: He is oriented to person, place, and time. He appears well-developed and well-nourished.   HENT:   Head: Normocephalic and atraumatic.   Nose: Nose normal.   Mouth/Throat: Oropharynx is clear and moist.   Eyes: Conjunctivae, EOM and lids are normal. Pupils are equal, round, and reactive to light.   Neck: Trachea normal and normal range of motion. Neck supple. Carotid bruit is not present. No tracheal deviation present. No thyroid mass and no thyromegaly present.   Cardiovascular: Normal rate, regular rhythm, normal heart sounds and intact distal pulses.  Exam reveals no gallop and no friction rub.    No murmur heard.  Pulmonary/Chest: Effort normal and breath sounds normal. No respiratory distress. He has no wheezes.   Musculoskeletal: Normal range of motion. He exhibits no edema or deformity.    Kevin had a diabetic foot exam performed today.   During the foot exam he had a monofilament test performed.    Neurological Sensory Findings - Altered hot/cold right ankle/foot discrimination and altered hot/cold left ankle/foot discrimination. Altered sharp/dull right ankle/foot discrimination and altered sharp/dull left ankle/foot discrimination. Right ankle/foot altered proprioception and left ankle/foot altered proprioception.    Vascular Status -  His exam exhibits right foot vasculature normal. His exam exhibits no right foot edema. His exam exhibits left foot vasculature normal. His exam exhibits no left foot edema.   Skin Integrity  -  His right foot skin is intact.     Kevin 's left foot skin is intact. .  Lymphadenopathy:     He has no cervical adenopathy.   Neurological: He is alert and oriented to person, place, and time. He has normal reflexes. He displays normal reflexes. No " cranial nerve deficit.   Skin: Skin is warm and dry. No rash noted. No cyanosis or erythema. Nails show no clubbing.   Psychiatric: He has a normal mood and affect. His speech is normal and behavior is normal. Judgment and thought content normal. Cognition and memory are normal.   Nursing note and vitals reviewed.    Results for orders placed or performed in visit on 07/20/17   POC Rapid Strep A   Result Value Ref Range    Rapid Strep A Screen Negative Negative, VALID, INVALID, Not Performed    Internal Control Passed Passed    Lot Number [id number]     Expiration Date 8-31-18      Kevin was seen today for follow-up, diabetes, hypertension, hypothyroidism and weight loss.    Diagnoses and all orders for this visit:    Type 2 diabetes mellitus without complication, with long-term current use of insulin  -     POC Glycosylated Hemoglobin (Hb A1C)  -     POC Glucose Fingerstick      Problem List Items Addressed This Visit        Cardiovascular and Mediastinum    Benign essential hypertension     bp is good- no changes recommended currently   Reviewed outside lab work and will update blood work today          Mixed hyperlipidemia     Order provided flp             Endocrine    Type 2 diabetes mellitus without complication, with long-term current use of insulin - Primary    Relevant Orders    POC Glycosylated Hemoglobin (Hb A1C) (Completed)    POC Glucose Fingerstick (Completed)    Acquired hypothyroidism     Order provided for tsh             Genitourinary    Renal insufficiency     Has lab ordered via nephrology   Get copy              Return in about 3 months (around 11/21/2017) for Recheck 30 min .    Valentina Rodriguez MA  Signed Anuradha Charles MD

## 2017-08-21 NOTE — ASSESSMENT & PLAN NOTE
bp is good- no changes recommended currently   Reviewed outside lab work and will update blood work today

## 2017-08-21 NOTE — ASSESSMENT & PLAN NOTE
Blood sugar and 90 day average sugar reviewed  Results for orders placed or performed in visit on 08/21/17   POC Glycosylated Hemoglobin (Hb A1C)   Result Value Ref Range    Hemoglobin A1C 7.3 %   POC Glucose Fingerstick   Result Value Ref Range    Glucose 193 (A) 70 - 130 mg/dL     Has lost about 60 lbs  On protein diet  Discussed continued work on weight loss and overall fitness   Has lab order via nephrology- gave our order for flp and tfts, cmp as well

## 2017-08-22 LAB
ALBUMIN SERPL-MCNC: 3.5 G/DL (ref 3.6–4.8)
ALBUMIN/GLOB SERPL: 1.3 {RATIO} (ref 1.2–2.2)
ALP SERPL-CCNC: 124 IU/L (ref 39–117)
ALT SERPL-CCNC: 28 IU/L (ref 0–44)
AMBIG ABBREV CMP14 DEFAULT: NORMAL
AMBIG ABBREV LP DEFAULT: NORMAL
AST SERPL-CCNC: 24 IU/L (ref 0–40)
BILIRUB SERPL-MCNC: 0.4 MG/DL (ref 0–1.2)
BUN SERPL-MCNC: 41 MG/DL (ref 8–27)
BUN/CREAT SERPL: 16 (ref 10–24)
CALCIUM SERPL-MCNC: 9.8 MG/DL (ref 8.6–10.2)
CHLORIDE SERPL-SCNC: 106 MMOL/L (ref 96–106)
CHOLEST SERPL-MCNC: 151 MG/DL (ref 100–199)
CO2 SERPL-SCNC: 22 MMOL/L (ref 18–29)
CREAT SERPL-MCNC: 2.51 MG/DL (ref 0.76–1.27)
GLOBULIN SER CALC-MCNC: 2.7 G/DL (ref 1.5–4.5)
GLUCOSE SERPL-MCNC: 174 MG/DL (ref 65–99)
HDLC SERPL-MCNC: 32 MG/DL
LDLC SERPL CALC-MCNC: 79 MG/DL (ref 0–99)
POTASSIUM SERPL-SCNC: 4.6 MMOL/L (ref 3.5–5.2)
PROT SERPL-MCNC: 6.2 G/DL (ref 6–8.5)
SODIUM SERPL-SCNC: 144 MMOL/L (ref 134–144)
TRIGL SERPL-MCNC: 199 MG/DL (ref 0–149)
TSH SERPL DL<=0.005 MIU/L-ACNC: 0.13 UIU/ML (ref 0.45–4.5)
VLDLC SERPL CALC-MCNC: 40 MG/DL (ref 5–40)

## 2017-09-14 ENCOUNTER — OFFICE VISIT (OUTPATIENT)
Dept: INTERNAL MEDICINE | Facility: CLINIC | Age: 62
End: 2017-09-14

## 2017-09-14 VITALS
HEART RATE: 70 BPM | DIASTOLIC BLOOD PRESSURE: 86 MMHG | SYSTOLIC BLOOD PRESSURE: 162 MMHG | RESPIRATION RATE: 17 BRPM | WEIGHT: 291 LBS | BODY MASS INDEX: 39.47 KG/M2

## 2017-09-14 DIAGNOSIS — E11.9 TYPE 2 DIABETES MELLITUS WITHOUT COMPLICATION, WITH LONG-TERM CURRENT USE OF INSULIN (HCC): ICD-10-CM

## 2017-09-14 DIAGNOSIS — I10 BENIGN ESSENTIAL HYPERTENSION: Primary | ICD-10-CM

## 2017-09-14 DIAGNOSIS — Z79.4 TYPE 2 DIABETES MELLITUS WITHOUT COMPLICATION, WITH LONG-TERM CURRENT USE OF INSULIN (HCC): ICD-10-CM

## 2017-09-14 DIAGNOSIS — E78.2 MIXED HYPERLIPIDEMIA: ICD-10-CM

## 2017-09-14 DIAGNOSIS — N28.9 RENAL INSUFFICIENCY: ICD-10-CM

## 2017-09-14 DIAGNOSIS — E03.9 ACQUIRED HYPOTHYROIDISM: ICD-10-CM

## 2017-09-14 DIAGNOSIS — E66.01 MORBID OBESITY DUE TO EXCESS CALORIES (HCC): ICD-10-CM

## 2017-09-14 PROCEDURE — 99214 OFFICE O/P EST MOD 30 MIN: CPT | Performed by: INTERNAL MEDICINE

## 2017-09-14 RX ORDER — ALLOPURINOL 100 MG/1
100 TABLET ORAL DAILY
Qty: 90 TABLET | Refills: 3 | Status: SHIPPED | OUTPATIENT
Start: 2017-09-14 | End: 2018-08-31 | Stop reason: SDUPTHER

## 2017-09-14 NOTE — PROGRESS NOTES
Subjective   Kevin Aguero is a 62 y.o. male.     History of Present Illness   For follow up of  IDDM but hasn't had to take any since his bariatric surgery.  He has lost 90 lbs since June!  HTN on meds, no chest pain, sob or headaches.  Hyperlipidemia on meds, no muscle aches.  Gout no new sx.  Renal insufficiency is stable, is followed by nephrology.    The following portions of the patient's history were reviewed and updated as appropriate: allergies, current medications, past medical history and problem list.    Review of Systems   Constitutional: Negative.    Eyes:        Has retinopathy.   Respiratory: Negative.  Negative for cough, chest tightness, shortness of breath and wheezing.    Cardiovascular: Negative.  Negative for chest pain, palpitations and leg swelling.   Gastrointestinal: Negative.  Negative for abdominal distention and abdominal pain.   Genitourinary: Negative.        Objective   Physical Exam   Constitutional: He appears well-developed and well-nourished.   Neck: Normal range of motion. Neck supple.   Cardiovascular: Normal rate, regular rhythm and normal heart sounds.  Exam reveals no gallop and no friction rub.    No murmur heard.  Pulmonary/Chest: Effort normal and breath sounds normal. No respiratory distress. He has no wheezes. He has no rales. He exhibits no tenderness.   Abdominal: Soft. Bowel sounds are normal. He exhibits no distension and no mass. There is no tenderness.   Well healed scars.   Nursing note and vitals reviewed.      Assessment/Plan   Kevin was seen today for hypertension.    Diagnoses and all orders for this visit:    Benign essential hypertension    Mixed hyperlipidemia    Morbid obesity due to excess calories    Acquired hypothyroidism    Type 2 diabetes mellitus without complication, with long-term current use of insulin    Renal insufficiency    Other orders  -     allopurinol (ZYLOPRIM) 100 MG tablet; Take 1 tablet by mouth Daily. For: Gout    Recent labs all  reviewed.  A1C = 7.3, creatinine 2.5 which is apparently a little better.  All problems are stable.  Great result after bariatric surgery.  Same meds.  Recheck 6 months.

## 2017-10-02 ENCOUNTER — TELEPHONE (OUTPATIENT)
Dept: ENDOCRINOLOGY | Facility: CLINIC | Age: 62
End: 2017-10-02

## 2017-10-02 RX ORDER — LEVOTHYROXINE SODIUM 0.1 MG/1
100 TABLET ORAL DAILY
Qty: 30 TABLET | Refills: 5 | Status: SHIPPED | OUTPATIENT
Start: 2017-10-02 | End: 2017-11-14

## 2017-10-09 ENCOUNTER — TELEPHONE (OUTPATIENT)
Dept: INTERNAL MEDICINE | Facility: CLINIC | Age: 62
End: 2017-10-09

## 2017-10-19 RX ORDER — LOSARTAN POTASSIUM 100 MG/1
TABLET ORAL
Qty: 90 TABLET | Refills: 2 | Status: SHIPPED | OUTPATIENT
Start: 2017-10-19 | End: 2018-06-14 | Stop reason: SDUPTHER

## 2017-10-20 ENCOUNTER — TELEPHONE (OUTPATIENT)
Dept: INTERNAL MEDICINE | Facility: CLINIC | Age: 62
End: 2017-10-20

## 2017-10-20 DIAGNOSIS — F32.9 REACTIVE DEPRESSION: ICD-10-CM

## 2017-10-20 RX ORDER — VENLAFAXINE 75 MG/1
37.5 TABLET ORAL 2 TIMES DAILY
Qty: 90 TABLET | Refills: 3 | Status: SHIPPED | OUTPATIENT
Start: 2017-10-20 | End: 2018-08-31 | Stop reason: SDUPTHER

## 2017-10-20 RX ORDER — SIMVASTATIN 40 MG
40 TABLET ORAL EVERY EVENING
Qty: 30 TABLET | Refills: 5 | Status: SHIPPED | OUTPATIENT
Start: 2017-10-20 | End: 2017-10-30 | Stop reason: SDUPTHER

## 2017-10-20 RX ORDER — VENLAFAXINE 75 MG/1
TABLET ORAL
Qty: 30 TABLET | Refills: 0 | Status: SHIPPED | OUTPATIENT
Start: 2017-10-20 | End: 2017-10-23 | Stop reason: SDUPTHER

## 2017-10-20 NOTE — TELEPHONE ENCOUNTER
----- Message from Carrie Valiente sent at 10/19/2017  3:46 PM EDT -----  Pt is requesting Venlafaxine HCL tab 75mg 1/2 a tablet, twice a day.    Yobani Morris    Patient [phone]

## 2017-10-23 ENCOUNTER — TELEPHONE (OUTPATIENT)
Dept: INTERNAL MEDICINE | Facility: CLINIC | Age: 62
End: 2017-10-23

## 2017-10-23 RX ORDER — VENLAFAXINE 75 MG/1
75 TABLET ORAL 2 TIMES DAILY
Qty: 180 TABLET | Refills: 0 | Status: SHIPPED | OUTPATIENT
Start: 2017-10-23 | End: 2017-11-13 | Stop reason: SDUPTHER

## 2017-10-23 NOTE — TELEPHONE ENCOUNTER
Rx Effexor refilled per chart via fax for a 90 day supply as requested. LVM informing pt that Rx has been sent to Rx has been reordered for a 90 day supply. Office # given if any questions or concerns.

## 2017-10-23 NOTE — TELEPHONE ENCOUNTER
----- Message from Sarah Tavarez sent at 10/18/2017  2:01 PM EDT -----  GIAN MILLS [phone]  PT HAD VENLAFAXINE CALLED IN TO [facility] AND [facility] PT WANTS IT CALLED TO [facility] PLEASE RECALL IN 90 DAY SUPPLY WITH REFILLS

## 2017-10-30 RX ORDER — SIMVASTATIN 40 MG
40 TABLET ORAL EVERY EVENING
Qty: 90 TABLET | Refills: 1 | Status: SHIPPED | OUTPATIENT
Start: 2017-10-30 | End: 2018-02-08 | Stop reason: SDUPTHER

## 2017-11-08 ENCOUNTER — CLINICAL SUPPORT (OUTPATIENT)
Dept: INTERNAL MEDICINE | Facility: CLINIC | Age: 62
End: 2017-11-08

## 2017-11-08 DIAGNOSIS — E53.8 VITAMIN B12 DEFICIENCY: Primary | ICD-10-CM

## 2017-11-08 PROCEDURE — 90471 IMMUNIZATION ADMIN: CPT | Performed by: INTERNAL MEDICINE

## 2017-11-08 PROCEDURE — 96372 THER/PROPH/DIAG INJ SC/IM: CPT | Performed by: INTERNAL MEDICINE

## 2017-11-08 PROCEDURE — 90686 IIV4 VACC NO PRSV 0.5 ML IM: CPT | Performed by: INTERNAL MEDICINE

## 2017-11-08 RX ORDER — CYANOCOBALAMIN 1000 UG/ML
1000 INJECTION, SOLUTION INTRAMUSCULAR; SUBCUTANEOUS
Status: DISCONTINUED | OUTPATIENT
Start: 2017-11-08 | End: 2018-02-19

## 2017-11-08 RX ADMIN — CYANOCOBALAMIN 1000 MCG: 1000 INJECTION, SOLUTION INTRAMUSCULAR; SUBCUTANEOUS at 15:12

## 2017-11-13 ENCOUNTER — HOSPITAL ENCOUNTER (OUTPATIENT)
Dept: GENERAL RADIOLOGY | Facility: HOSPITAL | Age: 62
Discharge: HOME | End: 2017-11-13
Attending: INTERNAL MEDICINE | Admitting: INTERNAL MEDICINE

## 2017-11-13 ENCOUNTER — OFFICE VISIT (OUTPATIENT)
Dept: ENDOCRINOLOGY | Facility: CLINIC | Age: 62
End: 2017-11-13

## 2017-11-13 VITALS
HEIGHT: 72 IN | WEIGHT: 267 LBS | SYSTOLIC BLOOD PRESSURE: 152 MMHG | HEART RATE: 56 BPM | OXYGEN SATURATION: 99 % | DIASTOLIC BLOOD PRESSURE: 80 MMHG | BODY MASS INDEX: 36.16 KG/M2

## 2017-11-13 DIAGNOSIS — E11.3293 MILD NONPROLIFERATIVE DIABETIC RETINOPATHY OF BOTH EYES WITHOUT MACULAR EDEMA ASSOCIATED WITH TYPE 2 DIABETES MELLITUS (HCC): ICD-10-CM

## 2017-11-13 DIAGNOSIS — I10 BENIGN ESSENTIAL HYPERTENSION: ICD-10-CM

## 2017-11-13 DIAGNOSIS — R05.9 COUGH: ICD-10-CM

## 2017-11-13 DIAGNOSIS — E03.9 ACQUIRED HYPOTHYROIDISM: ICD-10-CM

## 2017-11-13 DIAGNOSIS — E11.9 TYPE 2 DIABETES MELLITUS WITHOUT COMPLICATION, WITH LONG-TERM CURRENT USE OF INSULIN (HCC): ICD-10-CM

## 2017-11-13 DIAGNOSIS — E78.2 MIXED HYPERLIPIDEMIA: ICD-10-CM

## 2017-11-13 DIAGNOSIS — E11.9 TYPE 2 DIABETES MELLITUS WITHOUT COMPLICATION, WITHOUT LONG-TERM CURRENT USE OF INSULIN (HCC): Primary | ICD-10-CM

## 2017-11-13 DIAGNOSIS — Z79.4 TYPE 2 DIABETES MELLITUS WITHOUT COMPLICATION, WITH LONG-TERM CURRENT USE OF INSULIN (HCC): ICD-10-CM

## 2017-11-13 LAB
GLUCOSE BLDC GLUCOMTR-MCNC: 145 MG/DL (ref 70–130)
HBA1C MFR BLD: 6.9 %

## 2017-11-13 PROCEDURE — 99214 OFFICE O/P EST MOD 30 MIN: CPT | Performed by: INTERNAL MEDICINE

## 2017-11-13 PROCEDURE — 82947 ASSAY GLUCOSE BLOOD QUANT: CPT | Performed by: INTERNAL MEDICINE

## 2017-11-13 PROCEDURE — 71020 HC CHEST PA AND LATERAL: CPT

## 2017-11-13 PROCEDURE — 83036 HEMOGLOBIN GLYCOSYLATED A1C: CPT | Performed by: INTERNAL MEDICINE

## 2017-11-13 RX ORDER — CRISABOROLE 20 MG/G
OINTMENT TOPICAL
COMMUNITY
Start: 2017-09-29 | End: 2018-04-02

## 2017-11-13 RX ORDER — AMLODIPINE BESYLATE 5 MG/1
5 TABLET ORAL DAILY
Qty: 90 TABLET | Refills: 1 | Status: SHIPPED | OUTPATIENT
Start: 2017-11-13 | End: 2018-05-15 | Stop reason: SDUPTHER

## 2017-11-13 NOTE — ASSESSMENT & PLAN NOTE
Blood sugar and 90 day average sugar discussed  Results for orders placed or performed in visit on 11/13/17   POC Glycosylated Hemoglobin (Hb A1C)   Result Value Ref Range    Hemoglobin A1C 6.9 %   POC Glucose Fingerstick   Result Value Ref Range    Glucose 145 (A) 70 - 130 mg/dL     He is up to date with eye exam  Neuropathy with rash left foot - discussed foot care  Ur alb due today   No low sugars   Commended weight loss  Discussed insulin storage and disposal   Continue current medications   F/u 4 mons th

## 2017-11-14 ENCOUNTER — TELEPHONE (OUTPATIENT)
Dept: INTERNAL MEDICINE | Facility: CLINIC | Age: 62
End: 2017-11-14

## 2017-11-14 RX ORDER — LEVOTHYROXINE SODIUM 88 UG/1
88 TABLET ORAL DAILY
Qty: 30 TABLET | Refills: 5 | Status: SHIPPED | OUTPATIENT
Start: 2017-11-14 | End: 2018-02-17 | Stop reason: SDUPTHER

## 2017-12-12 ENCOUNTER — CLINICAL SUPPORT (OUTPATIENT)
Dept: INTERNAL MEDICINE | Facility: CLINIC | Age: 62
End: 2017-12-12

## 2017-12-12 DIAGNOSIS — E53.8 COBALAMIN DEFICIENCY: ICD-10-CM

## 2017-12-12 PROCEDURE — 96372 THER/PROPH/DIAG INJ SC/IM: CPT | Performed by: INTERNAL MEDICINE

## 2017-12-12 RX ADMIN — CYANOCOBALAMIN 1000 MCG: 1000 INJECTION, SOLUTION INTRAMUSCULAR; SUBCUTANEOUS at 14:26

## 2017-12-19 RX ORDER — DOXAZOSIN 2 MG/1
TABLET ORAL
Qty: 180 TABLET | Refills: 1 | Status: SHIPPED | OUTPATIENT
Start: 2017-12-19 | End: 2018-08-03 | Stop reason: SDUPTHER

## 2017-12-20 RX ORDER — NEBIVOLOL HYDROCHLORIDE 10 MG/1
TABLET ORAL
Qty: 90 TABLET | Refills: 3 | Status: SHIPPED | OUTPATIENT
Start: 2017-12-20 | End: 2018-08-16 | Stop reason: SDUPTHER

## 2018-01-01 ENCOUNTER — CLINICAL SUPPORT (OUTPATIENT)
Dept: INTERNAL MEDICINE | Facility: CLINIC | Age: 63
End: 2018-01-01

## 2018-01-01 DIAGNOSIS — E53.8 COBALAMIN DEFICIENCY: ICD-10-CM

## 2018-01-01 PROCEDURE — 96372 THER/PROPH/DIAG INJ SC/IM: CPT | Performed by: INTERNAL MEDICINE

## 2018-01-01 RX ORDER — ALLOPURINOL 100 MG/1
TABLET ORAL
Qty: 90 TABLET | Refills: 0 | Status: SHIPPED | OUTPATIENT
Start: 2018-01-01 | End: 2019-01-01 | Stop reason: SDUPTHER

## 2018-01-01 RX ORDER — ATORVASTATIN CALCIUM 20 MG/1
20 TABLET, FILM COATED ORAL DAILY
Qty: 90 TABLET | Refills: 1 | OUTPATIENT
Start: 2018-01-01 | End: 2019-12-26

## 2018-01-01 RX ORDER — NEBIVOLOL HYDROCHLORIDE 10 MG/1
TABLET ORAL
Qty: 90 TABLET | Refills: 0 | Status: SHIPPED | OUTPATIENT
Start: 2018-01-01 | End: 2019-01-01 | Stop reason: SDUPTHER

## 2018-01-01 RX ADMIN — CYANOCOBALAMIN 1000 MCG: 1000 INJECTION, SOLUTION INTRAMUSCULAR; SUBCUTANEOUS at 14:52

## 2018-01-18 ENCOUNTER — TELEPHONE (OUTPATIENT)
Dept: INTERNAL MEDICINE | Facility: CLINIC | Age: 63
End: 2018-01-18

## 2018-01-18 ENCOUNTER — CLINICAL SUPPORT (OUTPATIENT)
Dept: INTERNAL MEDICINE | Facility: CLINIC | Age: 63
End: 2018-01-18

## 2018-01-18 DIAGNOSIS — E53.8 COBALAMIN DEFICIENCY: ICD-10-CM

## 2018-01-18 PROCEDURE — 96372 THER/PROPH/DIAG INJ SC/IM: CPT | Performed by: INTERNAL MEDICINE

## 2018-01-18 RX ORDER — GABAPENTIN 800 MG/1
800 TABLET ORAL 3 TIMES DAILY
Qty: 270 TABLET | Refills: 1 | OUTPATIENT
Start: 2018-01-18 | End: 2019-01-01

## 2018-01-18 RX ADMIN — CYANOCOBALAMIN 1000 MCG: 1000 INJECTION, SOLUTION INTRAMUSCULAR; SUBCUTANEOUS at 15:47

## 2018-01-18 NOTE — TELEPHONE ENCOUNTER
----- Message from Juana Lopez sent at 1/18/2018  3:53 PM EST -----  [phone]    NEEDS REFILL OF GABAPENTIN-90 DAY SUPPLY    GIAN MILLS

## 2018-01-22 ENCOUNTER — TELEPHONE (OUTPATIENT)
Dept: INTERNAL MEDICINE | Facility: CLINIC | Age: 63
End: 2018-01-22

## 2018-01-22 NOTE — TELEPHONE ENCOUNTER
----- Message from Anne Kuhn sent at 1/22/2018  9:19 AM EST -----  Contact: GIAN MENDES FROM [facility] [place] CALLING IN REGARDS TO [facility]. THEY ACCIDENTALLY DELETED THE RX FOR GABAPENTIN, SHE WANTS TO KNOW IF THIS CAN BE RE-SENT. SHE CAN BE REACHED -[phone]

## 2018-01-22 NOTE — TELEPHONE ENCOUNTER
THIS OK? ALSO, THEY WANT TO MAKE SURE THE MGS AND DIRECTIONS ARE CORRECT. STATES THIS IS A HIGH DOSE OF GABAPENTIN

## 2018-02-08 RX ORDER — SIMVASTATIN 40 MG
40 TABLET ORAL EVERY EVENING
Qty: 30 TABLET | Refills: 0 | Status: SHIPPED | OUTPATIENT
Start: 2018-02-08 | End: 2018-02-15

## 2018-02-08 NOTE — TELEPHONE ENCOUNTER
PHARMACY CALLED IN REGARDS TO THE PATIENT'S SIMVASTATIN MEDICATION AND WOULD LIKE TO GET A CALL BACK -[phone]

## 2018-02-15 ENCOUNTER — OFFICE VISIT (OUTPATIENT)
Dept: ENDOCRINOLOGY | Facility: CLINIC | Age: 63
End: 2018-02-15

## 2018-02-15 VITALS
HEIGHT: 72 IN | BODY MASS INDEX: 34.13 KG/M2 | HEART RATE: 55 BPM | OXYGEN SATURATION: 99 % | DIASTOLIC BLOOD PRESSURE: 60 MMHG | SYSTOLIC BLOOD PRESSURE: 128 MMHG | WEIGHT: 252 LBS

## 2018-02-15 DIAGNOSIS — Z79.4 TYPE 2 DIABETES MELLITUS WITHOUT COMPLICATION, WITH LONG-TERM CURRENT USE OF INSULIN (HCC): Primary | ICD-10-CM

## 2018-02-15 DIAGNOSIS — E11.9 TYPE 2 DIABETES MELLITUS WITHOUT COMPLICATION, WITH LONG-TERM CURRENT USE OF INSULIN (HCC): Primary | ICD-10-CM

## 2018-02-15 DIAGNOSIS — I10 BENIGN ESSENTIAL HYPERTENSION: ICD-10-CM

## 2018-02-15 DIAGNOSIS — E78.2 MIXED HYPERLIPIDEMIA: ICD-10-CM

## 2018-02-15 DIAGNOSIS — E03.9 ACQUIRED HYPOTHYROIDISM: ICD-10-CM

## 2018-02-15 LAB
25(OH)D3 SERPL-MCNC: 24.5 NG/ML
ARTICHOKE IGE QN: 121 MG/DL (ref 0–130)
CHOLEST SERPL-MCNC: 207 MG/DL (ref 0–200)
GLUCOSE BLDC GLUCOMTR-MCNC: 148 MG/DL (ref 70–130)
HBA1C MFR BLD: 6.5 %
HDLC SERPL-MCNC: 58 MG/DL (ref 40–60)
TRIGL SERPL-MCNC: 170 MG/DL (ref 0–150)
TSH SERPL DL<=0.05 MIU/L-ACNC: 8.92 MIU/ML (ref 0.35–5.35)

## 2018-02-15 PROCEDURE — 82043 UR ALBUMIN QUANTITATIVE: CPT | Performed by: INTERNAL MEDICINE

## 2018-02-15 PROCEDURE — 99214 OFFICE O/P EST MOD 30 MIN: CPT | Performed by: INTERNAL MEDICINE

## 2018-02-15 PROCEDURE — 80061 LIPID PANEL: CPT | Performed by: INTERNAL MEDICINE

## 2018-02-15 PROCEDURE — 82570 ASSAY OF URINE CREATININE: CPT | Performed by: INTERNAL MEDICINE

## 2018-02-15 PROCEDURE — 83036 HEMOGLOBIN GLYCOSYLATED A1C: CPT | Performed by: INTERNAL MEDICINE

## 2018-02-15 PROCEDURE — 84443 ASSAY THYROID STIM HORMONE: CPT | Performed by: INTERNAL MEDICINE

## 2018-02-15 PROCEDURE — 82947 ASSAY GLUCOSE BLOOD QUANT: CPT | Performed by: INTERNAL MEDICINE

## 2018-02-15 PROCEDURE — 82306 VITAMIN D 25 HYDROXY: CPT | Performed by: INTERNAL MEDICINE

## 2018-02-15 RX ORDER — TERBINAFINE HYDROCHLORIDE 250 MG/1
250 TABLET ORAL DAILY
COMMUNITY
End: 2018-08-16

## 2018-02-15 RX ORDER — ATORVASTATIN CALCIUM 20 MG/1
20 TABLET, FILM COATED ORAL DAILY
Qty: 90 TABLET | Refills: 1 | Status: SHIPPED | OUTPATIENT
Start: 2018-02-15 | End: 2018-08-16 | Stop reason: SDUPTHER

## 2018-02-15 NOTE — ASSESSMENT & PLAN NOTE
Blood sugar and 90 day average sugar reviewed  Results for orders placed or performed in visit on 02/15/18   POC Glycosylated Hemoglobin (Hb A1C)   Result Value Ref Range    Hemoglobin A1C 6.5 %   POC Glucose Fingerstick   Result Value Ref Range    Glucose 148 (A) 70 - 130 mg/dL     Overall doing well  Is utd with preventive care  F/u 4-6 months

## 2018-02-15 NOTE — PROGRESS NOTES
Kevin Aguero 62 y.o.  CC:Follow-up; Diabetes (Type II, eye exam was Jan 17 by Dr Naylor at ); Hypertension; Hypothyroidism; Vitamin D Deficiency; and Hyperlipidemia (has been out of simvastatin x one week)      [place]: Follow-up; Diabetes (Type II, eye exam was Jan 17 by Dr Naylor at ); Hypertension; Hypothyroidism; Vitamin D Deficiency; and Hyperlipidemia (has been out of simvastatin x one week)  blood sugar and 90 day average sugar reviewed  Results for orders placed or performed in visit on 02/15/18   POC Glycosylated Hemoglobin (Hb A1C)   Result Value Ref Range    Hemoglobin A1C 6.5 %   POC Glucose Fingerstick   Result Value Ref Range    Glucose 148 (A) 70 - 130 mg/dL     Is up to date with eye exam  No foot lesion   bp is good   Is on low fat diet   Energy is good overall  Food does not taste good overall  Is out of statin- x 1 week  Thyroid testing showed overreplacement 11/17  Due for repeat   New c/o right pectoral pain - started 1-2 months ago - worse with movement or if bumps area   Not intolerable   Conscious of it - a couple of time sharp pain   Does not wake him up from sleep     Allergies   Allergen Reactions   • Sulfa Antibiotics        Current Outpatient Prescriptions:   •  terbinafine (lamiSIL) 250 MG tablet, Take 250 mg by mouth Daily., Disp: , Rfl:   •  allopurinol (ZYLOPRIM) 100 MG tablet, Take 1 tablet by mouth Daily. For: Gout, Disp: 90 tablet, Rfl: 3  •  amLODIPine (NORVASC) 5 MG tablet, Take 1 tablet by mouth Daily., Disp: 90 tablet, Rfl: 1  •  atorvastatin (LIPITOR) 20 MG tablet, Take 1 tablet by mouth Daily., Disp: 90 tablet, Rfl: 1  •  buPROPion (WELLBUTRIN) 100 MG tablet, Take 1 tablet by mouth 3 (Three) Times a Day., Disp: 270 tablet, Rfl: 3  •  BYSTOLIC 10 MG tablet, TAKE 1 TABLET DAILY, Disp: 90 tablet, Rfl: 3  •  Cholecalciferol (VITAMIN D3) 17364 units tablet, Take  by mouth 1 (One) Time Per Week., Disp: , Rfl:   •  clotrimazole-betamethasone (LOTRISONE) 1-0.05 % cream, Apply   topically 2 (two) times a day., Disp: 45 g, Rfl: 2  •  doxazosin (CARDURA) 2 MG tablet, TAKE 1 TABLET TWICE A DAY, Disp: 180 tablet, Rfl: 1  •  EUCRISA 2 % ointment, , Disp: , Rfl:   •  gabapentin (NEURONTIN) 800 MG tablet, Take 1 tablet by mouth 3 (Three) Times a Day. For: Gout (Patient taking differently: Take 800 mg by mouth 2 (Two) Times a Day. For: Gout), Disp: 270 tablet, Rfl: 1  •  glucose blood (ONE TOUCH ULTRA TEST) test strip, 4 (four) times a day. TEST; For: Diabetes mellitus, Disp: , Rfl:   •  Insulin Pen Needle (B-D ULTRAFINE III SHORT PEN) 31G X 8 MM misc, 4 (four) times a day. USE qid; For: Diabetes mellitus, Disp: , Rfl:   •  levothyroxine (SYNTHROID) 88 MCG tablet, Take 1 tablet by mouth Daily., Disp: 30 tablet, Rfl: 5  •  losartan (COZAAR) 100 MG tablet, TAKE 1 TABLET DAILY, Disp: 90 tablet, Rfl: 2  •  ONETOUCH DELICA LANCETS 33G misc, Use for blood sugar testing twice daily; For: Benign essential hypertension, Disp: , Rfl:   •  sildenafil (VIAGRA) 100 MG tablet, Take 1 tablet by mouth daily. 1 hour before needed; For: Diabetes mellitus, Disp: , Rfl:   •  venlafaxine (EFFEXOR) 75 MG tablet, Take 0.5 tablets by mouth 2 (Two) Times a Day., Disp: 90 tablet, Rfl: 3    Current Facility-Administered Medications:   •  cyanocobalamin injection 1,000 mcg, 1,000 mcg, Intramuscular, Q28 Days, Gilson Gates MD, 1,000 mcg at 01/18/18 1547  Patient Active Problem List    Diagnosis   • Cough [R05]   • Shoulder joint pain [M25.519]   • Callus [L84]   • Left arm pain [M79.602]   • Disease of jaw [M27.9]   • Hematuria [R31.9]   • Hyperkalemia [E87.5]   • Metabolic disorder [E88.9]     Overview Note:     syndrome - renal u/s neg for sofia       • Onychomycosis [B35.1]   • Sciatica [M54.30]   • Benign essential hypertension [I10]     Overview Note:     Impression: 12/21/2015 - increase doxazosin- goal bp 140/80 or less  Impression: 09/10/2015 - bp is good - continue to monitor creatinine  Impression: 02/20/2015 -  bp is high- repeat 162/84  more edema  has recently had diuretic dose adjusted  will continue to follow and he will discuss with nephrology if no better by appt with them  Impression: 10/09/2014 - bp is higher- increase amlodipine to bid; Description: edema improved.       Assessment & Plan Note:     bp is good      • Reactive depression [F32.9]   • Type 2 diabetes mellitus without complication, with long-term current use of insulin [E11.9, Z79.4]     Overview Note:     Impression: 12/21/2015 - reviewed hgn a1c with him   add 4 u of humalog if blood sugar premeal is over 200   samples and refills provided  Impression: 09/10/2015 - blood sugar 185, hgn a1c 7.7%  is up to date with preventive care  goals reviewed and emphasis on better fitness overall, building a regimen of regular exercise  he will work on this  continue f/u with nephrology  Impression: 05/28/2015 - blood sugar is 213, hgn a1c 8.7%  continue current medications  checking sugars bid  is adjusting insulin for higher sugars  did not bring meter  discussed diet, weight loss, referral for compulsive eating to [facility]  update lab work   has f/u podiatry and nephrology  will schedule updated eye exam  Impression: 02/20/2015 - blood sugar 89, hgn a1c 7.0 (in acceptable range).  continue to try to increase activity and work on weight loss  biggest risk is morbid obesity  is up to date with eye exam  nephrology appt coming up  has vascular changes feet- preulcerative callus left gt toe medially.  Cautioned him about proper foot care and use of lotion and pumice stone  Impression: 10/09/2014 - reviewed hgn a1c   doing well with diet, working on weight loss surgery  is up to date with eye exam  no neuropathy  ur alb pos- sees nephrology  foot care good- saw podiatry 2 weeks ago  Impression: 07/09/2014 - blood sugar is 113, hgn a1c 7.8% (average 170 or so, down from last a1c 8.3%)  commended efforts with diet and weight loss, continue to work on losing more weight  (target 30-40 lbs)  strategies for diet, exercise reviewed and download of glucometer discussed  planning to continue to work with dietician  Impression: 04/25/2014 - blood sugar 148, hgn a1c 8.3% (average 190 or so)  discussed current regimen and will transition him to lantus + humalog for better flexibility with insulin doses  start lantus 40 u daily and humalog 1 u per 8 gm carb  grids provided and have asked him to email sugars in 1-2 weeks and gave information re insulin pump;        Assessment & Plan Note:     Blood sugar and 90 day average sugar reviewed  Results for orders placed or performed in visit on 02/15/18   POC Glycosylated Hemoglobin (Hb A1C)   Result Value Ref Range    Hemoglobin A1C 6.5 %   POC Glucose Fingerstick   Result Value Ref Range    Glucose 148 (A) 70 - 130 mg/dL     Overall doing well  Is utd with preventive care  F/u 4-6 months      • Diabetic retinopathy [E11.319]     Overview Note:     Description: 10/15 worsening - [place] referred to [facility]     • Edema [R60.9]   • Gout [M10.9]   • Mixed hyperlipidemia [E78.2]     Overview Note:     Impression: 12/21/2015 - reviewed flp - Dr Gates increased statin   more achy- discussed adding co q 10  Impression: 09/10/2015 - reviewed recent lipid panel  no changes for now  see above  Impression: 05/28/2015 - check flp  Impression: 02/20/2015 - high tg- continue current medication and repeat fasting with physical  Impression: 10/09/2014 - reviewed flp;        Assessment & Plan Note:     Change to atorvastatin due to drug interaction between zocor and amlodipine   atorvastatn 20 mg daily rx sent      • Acquired hypothyroidism [E03.9]     Overview Note:     Impression: 12/21/2015 - check tfts - gave order  Impression: 09/10/2015 - tsh high normal  Impression: 05/28/2015 - check tsh  Impression: 02/20/2015 - he went off thyroid supplement ( was taking it at breakfast and then stopped because he was told he should take it before the meal).   Reinforced need for him to take this daily.  He will resume and update blood work when he has appt with Dr Gates.  Impression: 10/09/2014 - check tft;        Assessment & Plan Note:     On lower dose of supplement- check tsh      • Morbid obesity due to excess calories [E66.01]     Overview Note:     Impression: 04/25/2014 - over 30 min counselling- discussed weight loss surgery vs diet, will try tracking calories with my fitness pal;      • Osteoarthritis [M19.90]     Overview Note:     Impression: 09/10/2015 - discussed weight loss;      • Renal insufficiency [N28.9]     Overview Note:     Impression: 09/10/2015 - continue f/u nephrology;      • Sleep apnea [G47.30]   • Cobalamin deficiency [E53.8]     Review of Systems   Constitutional: Negative for activity change, appetite change, chills, diaphoresis, fatigue, fever and unexpected weight change.   HENT: Negative for congestion, dental problem, drooling, ear discharge, ear pain, facial swelling, hearing loss, mouth sores, nosebleeds, postnasal drip, rhinorrhea, sinus pressure, sneezing, sore throat, tinnitus, trouble swallowing and voice change.    Eyes: Negative for photophobia, pain, discharge, redness, itching and visual disturbance.   Respiratory: Negative for apnea, cough, choking, chest tightness, shortness of breath, wheezing and stridor.    Cardiovascular: Negative for chest pain, palpitations and leg swelling.   Gastrointestinal: Negative for abdominal distention, abdominal pain, anal bleeding, blood in stool, constipation, diarrhea, nausea, rectal pain and vomiting.   Endocrine: Negative for cold intolerance, heat intolerance, polydipsia, polyphagia and polyuria.   Genitourinary: Negative for decreased urine volume, difficulty urinating, dysuria, enuresis, flank pain, frequency, genital sores, hematuria and urgency.   Musculoskeletal: Negative for arthralgias, back pain, gait problem, joint swelling, myalgias, neck pain and neck stiffness.   Skin:  "Negative for color change, pallor, rash and wound.   Allergic/Immunologic: Negative for environmental allergies, food allergies and immunocompromised state.   Neurological: Negative for dizziness, tremors, seizures, syncope, facial asymmetry, speech difficulty, weakness, light-headedness, numbness and headaches.   Hematological: Negative for adenopathy. Does not bruise/bleed easily.   Psychiatric/Behavioral: Negative for agitation, behavioral problems, confusion, decreased concentration, dysphoric mood, hallucinations, self-injury, sleep disturbance and suicidal ideas. The patient is not nervous/anxious and is not hyperactive.      Social History     Social History   • Marital status:      Spouse name: N/A   • Number of children: N/A   • Years of education: N/A     Occupational History   • Not on file.     Social History Main Topics   • Smoking status: Never Smoker   • Smokeless tobacco: Not on file   • Alcohol use 0.6 oz/week     1 Cans of beer per week      Comment: 2 times per month   • Drug use: No   • Sexual activity: Defer     Other Topics Concern   • Not on file     Social History Narrative     Family History   Problem Relation Age of Onset   • No Known Problems Mother    • Diabetes Father    • Hypertension Father    • Cancer Father      /60  Pulse 55  Ht 182.9 cm (72\")  Wt 114 kg (252 lb)  SpO2 99%  BMI 34.18 kg/m2  Physical Exam   Constitutional: He is oriented to person, place, and time. He appears well-developed and well-nourished.   HENT:   Head: Normocephalic and atraumatic.   Nose: Nose normal.   Mouth/Throat: Oropharynx is clear and moist.   Eyes: Conjunctivae, EOM and lids are normal. Pupils are equal, round, and reactive to light.   Neck: Trachea normal and normal range of motion. Neck supple. Carotid bruit is not present. No tracheal deviation present. No thyroid mass and no thyromegaly present.   Cardiovascular: Normal rate, regular rhythm, normal heart sounds and intact distal " pulses.  Exam reveals no gallop and no friction rub.    No murmur heard.  Pulmonary/Chest: Effort normal and breath sounds normal. No respiratory distress. He has no wheezes.   Musculoskeletal: Normal range of motion. He exhibits no edema or deformity.    Kevin had a diabetic foot exam performed today.   During the foot exam he had a monofilament test performed.    Neurological Sensory Findings - Unaltered hot/cold right ankle/foot discrimination and unaltered hot/cold left ankle/foot discrimination. Unaltered sharp/dull right ankle/foot discrimination and unaltered sharp/dull left ankle/foot discrimination. No right ankle/foot altered proprioception and no left ankle/foot altered proprioception    Vascular Status -  His exam exhibits right foot vasculature normal. His exam exhibits no right foot edema. His exam exhibits left foot vasculature normal. His exam exhibits no left foot edema.   Skin Integrity  -  His right foot skin is intact.     Kevin 's left foot skin is intact. .  Lymphadenopathy:     He has no cervical adenopathy.   Neurological: He is alert and oriented to person, place, and time. He has normal reflexes. He displays normal reflexes. No cranial nerve deficit.   Skin: Skin is warm and dry. No rash noted. No cyanosis or erythema. Nails show no clubbing.   Psychiatric: He has a normal mood and affect. His speech is normal and behavior is normal. Judgment and thought content normal. Cognition and memory are normal.   Nursing note and vitals reviewed.    Results for orders placed or performed in visit on 02/15/18   POC Glycosylated Hemoglobin (Hb A1C)   Result Value Ref Range    Hemoglobin A1C 6.5 %   POC Glucose Fingerstick   Result Value Ref Range    Glucose 148 (A) 70 - 130 mg/dL     Kevin was seen today for follow-up, diabetes, hypertension, hypothyroidism, vitamin d deficiency and hyperlipidemia.    Diagnoses and all orders for this visit:    Type 2 diabetes mellitus without complication, with  long-term current use of insulin  -     POC Glycosylated Hemoglobin (Hb A1C)  -     POC Glucose Fingerstick    Acquired hypothyroidism  -     TSH    Benign essential hypertension    Mixed hyperlipidemia    Other orders  -     atorvastatin (LIPITOR) 20 MG tablet; Take 1 tablet by mouth Daily.      Problem List Items Addressed This Visit        Cardiovascular and Mediastinum    Mixed hyperlipidemia     Change to atorvastatin due to drug interaction between zocor and amlodipine   atorvastatn 20 mg daily rx sent          Relevant Medications    atorvastatin (LIPITOR) 20 MG tablet    Benign essential hypertension     bp is good             Endocrine    Type 2 diabetes mellitus without complication, with long-term current use of insulin - Primary     Blood sugar and 90 day average sugar reviewed  Results for orders placed or performed in visit on 02/15/18   POC Glycosylated Hemoglobin (Hb A1C)   Result Value Ref Range    Hemoglobin A1C 6.5 %   POC Glucose Fingerstick   Result Value Ref Range    Glucose 148 (A) 70 - 130 mg/dL     Overall doing well  Is utd with preventive care  F/u 4-6 months          Relevant Orders    POC Glycosylated Hemoglobin (Hb A1C) (Completed)    POC Glucose Fingerstick (Completed)    Acquired hypothyroidism     On lower dose of supplement- check tsh          Relevant Orders    TSH        Return in about 6 months (around 8/15/2018) for Recheck 30 min .    Anuradha Charles MD  Signed Anuradha Charles MD

## 2018-02-15 NOTE — ASSESSMENT & PLAN NOTE
Change to atorvastatin due to drug interaction between zocor and amlodipine   atorvastatn 20 mg daily rx sent

## 2018-02-16 LAB
CREAT 24H UR-MCNC: 79.4 MG/DL
MICROALBUMIN UR-MCNC: 2957.6 UG/ML
MICROALBUMIN/CREAT UR: 3724.9 MG/G CREAT (ref 0–30)

## 2018-02-17 ENCOUNTER — TELEPHONE (OUTPATIENT)
Dept: ENDOCRINOLOGY | Facility: CLINIC | Age: 63
End: 2018-02-17

## 2018-02-17 RX ORDER — LEVOTHYROXINE SODIUM 0.1 MG/1
100 TABLET ORAL DAILY
Qty: 30 TABLET | Refills: 5 | Status: SHIPPED | OUTPATIENT
Start: 2018-02-17 | End: 2018-08-19 | Stop reason: SDUPTHER

## 2018-02-19 ENCOUNTER — APPOINTMENT (OUTPATIENT)
Dept: GENERAL RADIOLOGY | Facility: HOSPITAL | Age: 63
End: 2018-02-19
Attending: INTERNAL MEDICINE

## 2018-02-19 ENCOUNTER — OFFICE VISIT (OUTPATIENT)
Dept: INTERNAL MEDICINE | Facility: CLINIC | Age: 63
End: 2018-02-19

## 2018-02-19 VITALS
BODY MASS INDEX: 34.04 KG/M2 | DIASTOLIC BLOOD PRESSURE: 76 MMHG | TEMPERATURE: 97.2 F | WEIGHT: 251 LBS | HEART RATE: 68 BPM | RESPIRATION RATE: 16 BRPM | SYSTOLIC BLOOD PRESSURE: 134 MMHG

## 2018-02-19 DIAGNOSIS — E53.8 VITAMIN B12 DEFICIENCY: ICD-10-CM

## 2018-02-19 DIAGNOSIS — R07.89 CHEST WALL TENDERNESS: Primary | ICD-10-CM

## 2018-02-19 PROCEDURE — 99214 OFFICE O/P EST MOD 30 MIN: CPT | Performed by: INTERNAL MEDICINE

## 2018-02-19 PROCEDURE — 96372 THER/PROPH/DIAG INJ SC/IM: CPT | Performed by: INTERNAL MEDICINE

## 2018-02-19 RX ORDER — CYANOCOBALAMIN 1000 UG/ML
1000 INJECTION, SOLUTION INTRAMUSCULAR; SUBCUTANEOUS ONCE
Status: COMPLETED | OUTPATIENT
Start: 2018-02-19 | End: 2018-02-19

## 2018-02-19 RX ADMIN — CYANOCOBALAMIN 1000 MCG: 1000 INJECTION, SOLUTION INTRAMUSCULAR; SUBCUTANEOUS at 11:33

## 2018-02-19 NOTE — PROGRESS NOTES
"Subjective   Kevin Aguero is a 62 y.o. male.     History of Present Illness     Soreness in the pectoral region  Localized to the right side  Sx: Patient says that he has been having right sided pectoral region soreness in his right breast.  Patient denies any recent trauma to this area but he does have a pinpoint sensation in the right costal region roughly at the fourth, fifth intervertebral costal along the midclavicular line.  No history of any nipple discharge, no asymmetry in breast tissue, no skin discoloration.  Patient has lost a total of 125 pounds (intentionally patient recently has had gastric bypass).  Patient does not take any NSAIDs for this particular pain.  \"I have just noticed it for a long period time and just wanted to check out        2 vitamin B 12 deficiency -patient needs injection today.     Family history: Bladder cancer-father    Social history: Patient does not smoke any cigarettes, no secondhand exposure to cigarette smoke, no EtOH consumption, no occupational exposure.    Review of Systems   All other systems reviewed and are negative.      Objective   Physical Exam   Constitutional: He appears well-developed and well-nourished.   HENT:   Head: Normocephalic.   Right Ear: External ear normal.   Left Ear: External ear normal.   Nose: Nose normal.   Mouth/Throat: Oropharynx is clear and moist.   Eyes: Conjunctivae and EOM are normal. Pupils are equal, round, and reactive to light.   Neck: Normal range of motion. Neck supple.   Cardiovascular: Normal rate, regular rhythm and normal heart sounds.    Pulmonary/Chest: Effort normal and breath sounds normal.   Musculoskeletal: He exhibits tenderness.   Palpable tenderness to deep palpation on the midclavicular right costal region.   Skin: Skin is warm.   Nursing note and vitals reviewed.      Assessment/Plan   Kevin was seen today for pain.    Diagnoses and all orders for this visit:    Chest wall tenderness  -     XR Ribs Right With PA " Chest; Future    Vitamin B12 deficiency  -     cyanocobalamin injection 1,000 mcg; Inject 1 mL into the shoulder, thigh, or buttocks 1 (One) Time.    Send Kody a detail note

## 2018-03-06 ENCOUNTER — TELEPHONE (OUTPATIENT)
Dept: INTERNAL MEDICINE | Facility: CLINIC | Age: 63
End: 2018-03-06

## 2018-03-06 NOTE — TELEPHONE ENCOUNTER
----- Message from Juana Lopez sent at 3/6/2018  1:06 PM EST -----  [phone]    PT CALLED TO GET RESULTS OF XRAY

## 2018-03-06 NOTE — TELEPHONE ENCOUNTER
Patient informed of results via lab letter. Verb good understadning     Dear Kevin  Your most recent chest x-ray for evaluation for a broken rib was negative.  There is no evidence of a broken on your chest x-ray.   Sincerely,  Gerardo Carmona MD

## 2018-03-19 ENCOUNTER — CLINICAL SUPPORT (OUTPATIENT)
Dept: INTERNAL MEDICINE | Facility: CLINIC | Age: 63
End: 2018-03-19

## 2018-03-19 DIAGNOSIS — E53.8 VITAMIN B12 DEFICIENCY: Primary | ICD-10-CM

## 2018-03-19 PROCEDURE — 96372 THER/PROPH/DIAG INJ SC/IM: CPT | Performed by: INTERNAL MEDICINE

## 2018-03-19 RX ORDER — CYANOCOBALAMIN 1000 UG/ML
1000 INJECTION, SOLUTION INTRAMUSCULAR; SUBCUTANEOUS
Status: SHIPPED | OUTPATIENT
Start: 2018-03-19

## 2018-03-19 RX ADMIN — CYANOCOBALAMIN 1000 MCG: 1000 INJECTION, SOLUTION INTRAMUSCULAR; SUBCUTANEOUS at 14:25

## 2018-04-02 ENCOUNTER — OFFICE VISIT (OUTPATIENT)
Dept: INTERNAL MEDICINE | Facility: CLINIC | Age: 63
End: 2018-04-02

## 2018-04-02 ENCOUNTER — APPOINTMENT (OUTPATIENT)
Dept: GENERAL RADIOLOGY | Facility: HOSPITAL | Age: 63
End: 2018-04-02
Attending: INTERNAL MEDICINE

## 2018-04-02 VITALS
WEIGHT: 257.6 LBS | HEART RATE: 70 BPM | HEIGHT: 71 IN | SYSTOLIC BLOOD PRESSURE: 150 MMHG | DIASTOLIC BLOOD PRESSURE: 70 MMHG | RESPIRATION RATE: 16 BRPM | BODY MASS INDEX: 36.06 KG/M2 | TEMPERATURE: 97.9 F

## 2018-04-02 DIAGNOSIS — Z00.00 HEALTH MAINTENANCE EXAMINATION: ICD-10-CM

## 2018-04-02 DIAGNOSIS — F32.9 REACTIVE DEPRESSION: ICD-10-CM

## 2018-04-02 DIAGNOSIS — E78.2 MIXED HYPERLIPIDEMIA: ICD-10-CM

## 2018-04-02 DIAGNOSIS — E66.01 MORBID OBESITY DUE TO EXCESS CALORIES (HCC): ICD-10-CM

## 2018-04-02 DIAGNOSIS — Z79.4 TYPE 2 DIABETES MELLITUS WITHOUT COMPLICATION, WITH LONG-TERM CURRENT USE OF INSULIN (HCC): ICD-10-CM

## 2018-04-02 DIAGNOSIS — E03.9 ACQUIRED HYPOTHYROIDISM: ICD-10-CM

## 2018-04-02 DIAGNOSIS — I10 BENIGN ESSENTIAL HYPERTENSION: Primary | ICD-10-CM

## 2018-04-02 DIAGNOSIS — M15.9 PRIMARY OSTEOARTHRITIS INVOLVING MULTIPLE JOINTS: ICD-10-CM

## 2018-04-02 DIAGNOSIS — M25.551 RIGHT HIP PAIN: ICD-10-CM

## 2018-04-02 DIAGNOSIS — E11.9 TYPE 2 DIABETES MELLITUS WITHOUT COMPLICATION, WITH LONG-TERM CURRENT USE OF INSULIN (HCC): ICD-10-CM

## 2018-04-02 LAB
ALBUMIN SERPL-MCNC: 3 G/DL (ref 3.2–4.8)
ALBUMIN/GLOB SERPL: 1.4 G/DL (ref 1.5–2.5)
ALP SERPL-CCNC: 99 U/L (ref 25–100)
ALT SERPL W P-5'-P-CCNC: 52 U/L (ref 7–40)
ANION GAP SERPL CALCULATED.3IONS-SCNC: 8 MMOL/L (ref 3–11)
AST SERPL-CCNC: 33 U/L (ref 0–33)
BILIRUB SERPL-MCNC: 0.6 MG/DL (ref 0.3–1.2)
BUN BLD-MCNC: 46 MG/DL (ref 9–23)
BUN/CREAT SERPL: 13.1 (ref 7–25)
CALCIUM SPEC-SCNC: 8.8 MG/DL (ref 8.7–10.4)
CHLORIDE SERPL-SCNC: 111 MMOL/L (ref 99–109)
CO2 SERPL-SCNC: 25 MMOL/L (ref 20–31)
CREAT BLD-MCNC: 3.5 MG/DL (ref 0.6–1.3)
DEPRECATED RDW RBC AUTO: 47.7 FL (ref 37–54)
ERYTHROCYTE [DISTWIDTH] IN BLOOD BY AUTOMATED COUNT: 13.7 % (ref 11.3–14.5)
GFR SERPL CREATININE-BSD FRML MDRD: 18 ML/MIN/1.73
GLOBULIN UR ELPH-MCNC: 2.2 GM/DL
GLUCOSE BLD-MCNC: 133 MG/DL (ref 70–100)
HCT VFR BLD AUTO: 30 % (ref 38.9–50.9)
HGB BLD-MCNC: 9.9 G/DL (ref 13.1–17.5)
MCH RBC QN AUTO: 31.2 PG (ref 27–31)
MCHC RBC AUTO-ENTMCNC: 33 G/DL (ref 32–36)
MCV RBC AUTO: 94.6 FL (ref 80–99)
PLATELET # BLD AUTO: 191 10*3/MM3 (ref 150–450)
PMV BLD AUTO: 9.7 FL (ref 6–12)
POTASSIUM BLD-SCNC: 4.1 MMOL/L (ref 3.5–5.5)
PROT SERPL-MCNC: 5.2 G/DL (ref 5.7–8.2)
PSA SERPL-MCNC: 0.54 NG/ML (ref 0–4)
RBC # BLD AUTO: 3.17 10*6/MM3 (ref 4.2–5.76)
SODIUM BLD-SCNC: 144 MMOL/L (ref 132–146)
WBC NRBC COR # BLD: 5.5 10*3/MM3 (ref 3.5–10.8)

## 2018-04-02 PROCEDURE — G0103 PSA SCREENING: HCPCS | Performed by: INTERNAL MEDICINE

## 2018-04-02 PROCEDURE — 80053 COMPREHEN METABOLIC PANEL: CPT | Performed by: INTERNAL MEDICINE

## 2018-04-02 PROCEDURE — 36415 COLL VENOUS BLD VENIPUNCTURE: CPT | Performed by: INTERNAL MEDICINE

## 2018-04-02 PROCEDURE — 85027 COMPLETE CBC AUTOMATED: CPT | Performed by: INTERNAL MEDICINE

## 2018-04-02 PROCEDURE — 99396 PREV VISIT EST AGE 40-64: CPT | Performed by: INTERNAL MEDICINE

## 2018-04-02 NOTE — PROGRESS NOTES
David Aguero is a 62 y.o. male.     History of Present Illness   He is here for annual physical and follow up of  Obesity, he is now 9 months post gastric bypass and is doing well.  His weight is stable.  Hypothyroid on replacement, he feels cold a lot.  IDDM is doing pretty well, followed by endo.  Depression is stable on meds.  Has some right hip pain and it keeps him from exercising.  HTN on meds, no headaches, sob or chest pain.      The following portions of the patient's history were reviewed and updated as appropriate: allergies, current medications, past family history, past medical history, past social history, past surgical history and problem list.    Review of Systems   Constitutional: Negative.  Negative for activity change, appetite change, fatigue and fever.   HENT: Negative.  Negative for dental problem, ear pain, hearing loss, postnasal drip, rhinorrhea, sinus pressure, sore throat, trouble swallowing and voice change.    Eyes: Negative.  Negative for redness and visual disturbance.   Respiratory: Negative.  Negative for cough, chest tightness, shortness of breath and wheezing.    Cardiovascular: Positive for leg swelling (has always had some.). Negative for chest pain and palpitations.   Gastrointestinal: Negative.  Negative for abdominal distention, abdominal pain, blood in stool, constipation, diarrhea and nausea.   Endocrine: Negative.  Negative for polydipsia and polyuria.   Genitourinary: Negative.  Negative for decreased urine volume, dysuria, hematuria and urgency.   Musculoskeletal: Negative.  Negative for arthralgias, back pain and neck pain.        As noted.   Skin: Negative.  Negative for pallor and rash.   Allergic/Immunologic: Negative.    Neurological: Negative.  Negative for dizziness, tremors, speech difficulty, weakness, numbness and headaches.   Hematological: Negative.  Negative for adenopathy.   Psychiatric/Behavioral: Negative.  Negative for agitation, behavioral  problems, confusion, dysphoric mood and sleep disturbance. The patient is not nervous/anxious and is not hyperactive.        Objective   Physical Exam   Constitutional: He is oriented to person, place, and time. He appears well-developed and well-nourished.   140/80 big cuff left.   HENT:   Head: Normocephalic and atraumatic.   Right Ear: External ear normal.   Left Ear: External ear normal.   Nose: Nose normal.   Mouth/Throat: Oropharynx is clear and moist.   Eyes: Conjunctivae and EOM are normal. Pupils are equal, round, and reactive to light.   Fundoscopic exam:       The right eye shows no AV nicking and no hemorrhage.        The left eye shows no AV nicking and no hemorrhage.   Neck: Normal range of motion. Neck supple. No thyromegaly present.   Cardiovascular: Normal rate, regular rhythm, normal heart sounds and intact distal pulses.  Exam reveals no gallop and no friction rub.    No murmur heard.  Carotids normal.   Pulmonary/Chest: Effort normal and breath sounds normal. No respiratory distress. He has no wheezes. He has no rales. He exhibits no tenderness.   Abdominal: Soft. Bowel sounds are normal. He exhibits no distension and no mass. There is no tenderness. No hernia.   Large amount of panicular skin from weight loss.   Genitourinary: Rectum normal, prostate normal, testes normal and penis normal.   Musculoskeletal: Normal range of motion. He exhibits no edema.   Right hip move well to me.     Lymphadenopathy:     He has no cervical adenopathy.   Neurological: He is alert and oriented to person, place, and time. He has normal reflexes. No cranial nerve deficit.   Skin: Skin is warm and dry.   Psychiatric: He has a normal mood and affect. His behavior is normal. Judgment and thought content normal.   Nursing note and vitals reviewed.      Assessment/Plan   Kevin was seen today for annual exam.    Diagnoses and all orders for this visit:    Benign essential hypertension    Mixed hyperlipidemia  -      Comprehensive Metabolic Panel    Morbid obesity due to excess calories    Type 2 diabetes mellitus without complication, with long-term current use of insulin    Acquired hypothyroidism    Primary osteoarthritis involving multiple joints    Reactive depression    Health maintenance examination  -     PSA Screen  -     CBC (No Diff)    Right hip pain  -     XR Hip With or Without Pelvis 2 - 3 View Right; Future    He seems to be doing well but for hip.  Will get xray.  Labs as noted.  Counseled on diet and immunizations.  Health maintenance is up to date.  Same meds.  Recheck 6 months.

## 2018-04-17 DIAGNOSIS — F32.9 REACTIVE DEPRESSION: ICD-10-CM

## 2018-04-17 RX ORDER — BUPROPION HYDROCHLORIDE 100 MG/1
TABLET ORAL
Qty: 270 TABLET | Refills: 2 | Status: SHIPPED | OUTPATIENT
Start: 2018-04-17 | End: 2019-01-01 | Stop reason: SDUPTHER

## 2018-04-19 ENCOUNTER — CLINICAL SUPPORT (OUTPATIENT)
Dept: INTERNAL MEDICINE | Facility: CLINIC | Age: 63
End: 2018-04-19

## 2018-04-19 DIAGNOSIS — E53.8 COBALAMIN DEFICIENCY: ICD-10-CM

## 2018-04-19 PROCEDURE — 96372 THER/PROPH/DIAG INJ SC/IM: CPT | Performed by: INTERNAL MEDICINE

## 2018-04-19 RX ADMIN — CYANOCOBALAMIN 1000 MCG: 1000 INJECTION, SOLUTION INTRAMUSCULAR; SUBCUTANEOUS at 14:29

## 2018-05-15 ENCOUNTER — OFFICE VISIT (OUTPATIENT)
Dept: INTERNAL MEDICINE | Facility: CLINIC | Age: 63
End: 2018-05-15

## 2018-05-15 VITALS
SYSTOLIC BLOOD PRESSURE: 172 MMHG | OXYGEN SATURATION: 98 % | WEIGHT: 232 LBS | HEART RATE: 64 BPM | HEIGHT: 71 IN | DIASTOLIC BLOOD PRESSURE: 70 MMHG | BODY MASS INDEX: 32.48 KG/M2

## 2018-05-15 DIAGNOSIS — E66.01 MORBID OBESITY DUE TO EXCESS CALORIES (HCC): ICD-10-CM

## 2018-05-15 DIAGNOSIS — M25.473 ANKLE EDEMA: ICD-10-CM

## 2018-05-15 DIAGNOSIS — E78.2 MIXED HYPERLIPIDEMIA: ICD-10-CM

## 2018-05-15 DIAGNOSIS — E11.9 TYPE 2 DIABETES MELLITUS WITHOUT COMPLICATION, WITH LONG-TERM CURRENT USE OF INSULIN (HCC): ICD-10-CM

## 2018-05-15 DIAGNOSIS — N28.9 RENAL INSUFFICIENCY: ICD-10-CM

## 2018-05-15 DIAGNOSIS — I10 BENIGN ESSENTIAL HYPERTENSION: Primary | ICD-10-CM

## 2018-05-15 DIAGNOSIS — Z79.4 TYPE 2 DIABETES MELLITUS WITHOUT COMPLICATION, WITH LONG-TERM CURRENT USE OF INSULIN (HCC): ICD-10-CM

## 2018-05-15 DIAGNOSIS — E03.9 ACQUIRED HYPOTHYROIDISM: ICD-10-CM

## 2018-05-15 PROCEDURE — 99214 OFFICE O/P EST MOD 30 MIN: CPT | Performed by: INTERNAL MEDICINE

## 2018-05-15 RX ORDER — AMLODIPINE BESYLATE 5 MG/1
5 TABLET ORAL DAILY
Qty: 90 TABLET | Refills: 1 | Status: SHIPPED | OUTPATIENT
Start: 2018-05-15 | End: 2018-08-31 | Stop reason: SDUPTHER

## 2018-05-15 NOTE — PROGRESS NOTES
Subjective   Kevin Aguero is a 62 y.o. male.     History of Present Illness   He has noticed some ankle edema for the past week or two.  He has not been sob, or had pain in his legs.  He stopped taking his amlodipine because he ran out over one month ago.  Sometimes he feels off balance.    The following portions of the patient's history were reviewed and updated as appropriate: allergies, current medications, past medical history and problem list.    Review of Systems   Constitutional: Positive for fatigue.   Eyes: Negative.    Respiratory: Negative.  Negative for cough, chest tightness, shortness of breath and wheezing.    Cardiovascular: Positive for leg swelling (as noted.). Negative for chest pain and palpitations.   Gastrointestinal: Negative.  Negative for abdominal distention, abdominal pain and anal bleeding.   Genitourinary: Negative.        Objective   Physical Exam   Constitutional: He appears well-developed and well-nourished.   Neck: Normal range of motion. Neck supple.   Cardiovascular: Normal rate, regular rhythm, normal heart sounds and intact distal pulses.  Exam reveals no gallop and no friction rub.    No murmur heard.  Pulmonary/Chest: Effort normal and breath sounds normal. No respiratory distress. He has no wheezes. He has no rales. He exhibits no tenderness.   Abdominal: Soft. Bowel sounds are normal. He exhibits no distension. There is no tenderness.   Musculoskeletal: He exhibits edema (bilateral 2+ ankle edema.).   Nursing note and vitals reviewed.        Assessment/Plan   Kevin was seen today for edema.    Diagnoses and all orders for this visit:    Benign essential hypertension    Mixed hyperlipidemia    Morbid obesity due to excess calories    Acquired hypothyroidism    Type 2 diabetes mellitus without complication, with long-term current use of insulin    Renal insufficiency    Ankle edema    Other orders  -     amLODIPine (NORVASC) 5 MG tablet; Take 1 tablet by mouth Daily.    His  blood pressure is to high and I recommended he restart the amlodipine.  Obviously the amlodipine is not causing the edema.  Most likely the cause of his edema is his rising creatinine. It was 3.5 last month.  I recommended he see his nephrologist soon.  He understands.  Other that the amlodipine, stay on the same meds for now.  Over 30 minutes spent with patient, over 1/2 in counseling.

## 2018-05-21 ENCOUNTER — CLINICAL SUPPORT (OUTPATIENT)
Dept: INTERNAL MEDICINE | Facility: CLINIC | Age: 63
End: 2018-05-21

## 2018-05-21 DIAGNOSIS — E53.8 COBALAMIN DEFICIENCY: ICD-10-CM

## 2018-05-21 PROCEDURE — 96372 THER/PROPH/DIAG INJ SC/IM: CPT | Performed by: INTERNAL MEDICINE

## 2018-05-21 RX ADMIN — CYANOCOBALAMIN 1000 MCG: 1000 INJECTION, SOLUTION INTRAMUSCULAR; SUBCUTANEOUS at 14:05

## 2018-06-11 ENCOUNTER — TELEPHONE (OUTPATIENT)
Dept: INTERNAL MEDICINE | Facility: CLINIC | Age: 63
End: 2018-06-11

## 2018-06-11 NOTE — TELEPHONE ENCOUNTER
----- Message from Juana Lopez sent at 6/11/2018  3:54 PM EDT -----  [phone]    PT SEES NEPHROLOGIST.  HE IS DEALING WITH SIGNIFICANT SWELLING IN HIS LEGS AND ANKLE. DOES HE NEED TO GO THROUGH DR SCOTT TO HELP OR NEPHRO?    GIAN GRIMES

## 2018-06-14 ENCOUNTER — TELEPHONE (OUTPATIENT)
Dept: INTERNAL MEDICINE | Facility: CLINIC | Age: 63
End: 2018-06-14

## 2018-06-14 RX ORDER — LOSARTAN POTASSIUM 100 MG/1
100 TABLET ORAL DAILY
Qty: 90 TABLET | Refills: 2 | Status: SHIPPED | OUTPATIENT
Start: 2018-06-14 | End: 2019-01-01

## 2018-06-14 NOTE — TELEPHONE ENCOUNTER
----- Message from June Rojas V sent at 6/14/2018 11:11 AM EDT -----  PT CALLED NEEDING A REFILL ON losartan (COZAAR) 100 MG tablet, NEEDS 90 DAY SUPPLY    HE CAN BE REACHED [phone]    [place], [place]

## 2018-07-13 ENCOUNTER — TELEPHONE (OUTPATIENT)
Dept: INTERNAL MEDICINE | Facility: CLINIC | Age: 63
End: 2018-07-13

## 2018-07-13 ENCOUNTER — CLINICAL SUPPORT (OUTPATIENT)
Dept: INTERNAL MEDICINE | Facility: CLINIC | Age: 63
End: 2018-07-13

## 2018-07-13 DIAGNOSIS — Z00.00 HEALTH MAINTENANCE EXAMINATION: Primary | ICD-10-CM

## 2018-07-13 PROCEDURE — 96372 THER/PROPH/DIAG INJ SC/IM: CPT | Performed by: INTERNAL MEDICINE

## 2018-07-13 RX ADMIN — CYANOCOBALAMIN 1000 MCG: 1000 INJECTION, SOLUTION INTRAMUSCULAR; SUBCUTANEOUS at 15:08

## 2018-07-13 NOTE — TELEPHONE ENCOUNTER
----- Message from Karina Diaz sent at 7/13/2018  3:10 PM EDT -----  PATIENT IS REQUESTING A REFERRAL TO GET A COLONOSCOPY DONE.     PLEASE CALL PATIENT : [phone]    THANK YOU

## 2018-08-03 RX ORDER — DOXAZOSIN 2 MG/1
2 TABLET ORAL 2 TIMES DAILY
Qty: 180 TABLET | Refills: 1 | Status: SHIPPED | OUTPATIENT
Start: 2018-08-03 | End: 2019-01-01 | Stop reason: SDUPTHER

## 2018-08-10 DIAGNOSIS — Z12.11 SCREENING FOR COLON CANCER: Primary | ICD-10-CM

## 2018-08-13 ENCOUNTER — CLINICAL SUPPORT (OUTPATIENT)
Dept: INTERNAL MEDICINE | Facility: CLINIC | Age: 63
End: 2018-08-13

## 2018-08-13 DIAGNOSIS — E53.8 COBALAMIN DEFICIENCY: ICD-10-CM

## 2018-08-13 PROCEDURE — 96372 THER/PROPH/DIAG INJ SC/IM: CPT | Performed by: INTERNAL MEDICINE

## 2018-08-13 RX ADMIN — CYANOCOBALAMIN 1000 MCG: 1000 INJECTION, SOLUTION INTRAMUSCULAR; SUBCUTANEOUS at 15:20

## 2018-08-16 ENCOUNTER — OFFICE VISIT (OUTPATIENT)
Dept: ENDOCRINOLOGY | Facility: CLINIC | Age: 63
End: 2018-08-16

## 2018-08-16 VITALS
DIASTOLIC BLOOD PRESSURE: 68 MMHG | WEIGHT: 228 LBS | OXYGEN SATURATION: 98 % | BODY MASS INDEX: 30.88 KG/M2 | HEIGHT: 72 IN | HEART RATE: 59 BPM | SYSTOLIC BLOOD PRESSURE: 138 MMHG

## 2018-08-16 DIAGNOSIS — E78.2 MIXED HYPERLIPIDEMIA: ICD-10-CM

## 2018-08-16 DIAGNOSIS — E03.9 ACQUIRED HYPOTHYROIDISM: ICD-10-CM

## 2018-08-16 DIAGNOSIS — E11.9 TYPE 2 DIABETES MELLITUS WITHOUT COMPLICATION, WITH LONG-TERM CURRENT USE OF INSULIN (HCC): Primary | ICD-10-CM

## 2018-08-16 DIAGNOSIS — Z79.4 TYPE 2 DIABETES MELLITUS WITHOUT COMPLICATION, WITH LONG-TERM CURRENT USE OF INSULIN (HCC): Primary | ICD-10-CM

## 2018-08-16 DIAGNOSIS — I10 BENIGN ESSENTIAL HYPERTENSION: ICD-10-CM

## 2018-08-16 LAB
ALBUMIN SERPL-MCNC: 3.23 G/DL (ref 3.2–4.8)
ALBUMIN/GLOB SERPL: 1.4 G/DL (ref 1.5–2.5)
ALP SERPL-CCNC: 113 U/L (ref 25–100)
ALT SERPL W P-5'-P-CCNC: 50 U/L (ref 7–40)
ANION GAP SERPL CALCULATED.3IONS-SCNC: 5 MMOL/L (ref 3–11)
ARTICHOKE IGE QN: 101 MG/DL (ref 0–130)
AST SERPL-CCNC: 34 U/L (ref 0–33)
BASOPHILS # BLD AUTO: 0.04 10*3/MM3 (ref 0–0.2)
BASOPHILS NFR BLD AUTO: 0.7 % (ref 0–1)
BILIRUB SERPL-MCNC: 0.5 MG/DL (ref 0.3–1.2)
BUN BLD-MCNC: 55 MG/DL (ref 9–23)
BUN/CREAT SERPL: 14 (ref 7–25)
CALCIUM SPEC-SCNC: 8.8 MG/DL (ref 8.7–10.4)
CHLORIDE SERPL-SCNC: 112 MMOL/L (ref 99–109)
CHOLEST SERPL-MCNC: 182 MG/DL (ref 0–200)
CO2 SERPL-SCNC: 25 MMOL/L (ref 20–31)
CREAT BLD-MCNC: 3.93 MG/DL (ref 0.6–1.3)
DEPRECATED RDW RBC AUTO: 46.2 FL (ref 37–54)
EOSINOPHIL # BLD AUTO: 0.18 10*3/MM3 (ref 0–0.3)
EOSINOPHIL NFR BLD AUTO: 3.1 % (ref 0–3)
ERYTHROCYTE [DISTWIDTH] IN BLOOD BY AUTOMATED COUNT: 13.3 % (ref 11.3–14.5)
GFR SERPL CREATININE-BSD FRML MDRD: 15 ML/MIN/1.73
GLOBULIN UR ELPH-MCNC: 2.3 GM/DL
GLUCOSE BLD-MCNC: 131 MG/DL (ref 70–100)
GLUCOSE BLDC GLUCOMTR-MCNC: 143 MG/DL (ref 70–130)
HBA1C MFR BLD: 6.1 %
HCT VFR BLD AUTO: 31.3 % (ref 38.9–50.9)
HDLC SERPL-MCNC: 61 MG/DL (ref 40–60)
HGB BLD-MCNC: 10.3 G/DL (ref 13.1–17.5)
IMM GRANULOCYTES # BLD: 0.01 10*3/MM3 (ref 0–0.03)
IMM GRANULOCYTES NFR BLD: 0.2 % (ref 0–0.6)
LYMPHOCYTES # BLD AUTO: 1.06 10*3/MM3 (ref 0.6–4.8)
LYMPHOCYTES NFR BLD AUTO: 18.2 % (ref 24–44)
MCH RBC QN AUTO: 31.4 PG (ref 27–31)
MCHC RBC AUTO-ENTMCNC: 32.9 G/DL (ref 32–36)
MCV RBC AUTO: 95.4 FL (ref 80–99)
MONOCYTES # BLD AUTO: 0.36 10*3/MM3 (ref 0–1)
MONOCYTES NFR BLD AUTO: 6.2 % (ref 0–12)
NEUTROPHILS # BLD AUTO: 4.17 10*3/MM3 (ref 1.5–8.3)
NEUTROPHILS NFR BLD AUTO: 71.6 % (ref 41–71)
PLATELET # BLD AUTO: 182 10*3/MM3 (ref 150–450)
PMV BLD AUTO: 9.9 FL (ref 6–12)
POTASSIUM BLD-SCNC: 4.2 MMOL/L (ref 3.5–5.5)
PROT SERPL-MCNC: 5.5 G/DL (ref 5.7–8.2)
RBC # BLD AUTO: 3.28 10*6/MM3 (ref 4.2–5.76)
SODIUM BLD-SCNC: 142 MMOL/L (ref 132–146)
TRIGL SERPL-MCNC: 148 MG/DL (ref 0–150)
TSH SERPL DL<=0.05 MIU/L-ACNC: 42.94 MIU/ML (ref 0.35–5.35)
WBC NRBC COR # BLD: 5.82 10*3/MM3 (ref 3.5–10.8)

## 2018-08-16 PROCEDURE — 85025 COMPLETE CBC W/AUTO DIFF WBC: CPT | Performed by: INTERNAL MEDICINE

## 2018-08-16 PROCEDURE — 99214 OFFICE O/P EST MOD 30 MIN: CPT | Performed by: INTERNAL MEDICINE

## 2018-08-16 PROCEDURE — 82947 ASSAY GLUCOSE BLOOD QUANT: CPT | Performed by: INTERNAL MEDICINE

## 2018-08-16 PROCEDURE — 80061 LIPID PANEL: CPT | Performed by: INTERNAL MEDICINE

## 2018-08-16 PROCEDURE — 84443 ASSAY THYROID STIM HORMONE: CPT | Performed by: INTERNAL MEDICINE

## 2018-08-16 PROCEDURE — 83036 HEMOGLOBIN GLYCOSYLATED A1C: CPT | Performed by: INTERNAL MEDICINE

## 2018-08-16 PROCEDURE — 80053 COMPREHEN METABOLIC PANEL: CPT | Performed by: INTERNAL MEDICINE

## 2018-08-16 RX ORDER — ATORVASTATIN CALCIUM 20 MG/1
20 TABLET, FILM COATED ORAL DAILY
Qty: 90 TABLET | Refills: 1 | Status: SHIPPED | OUTPATIENT
Start: 2018-08-16 | End: 2019-01-01 | Stop reason: SDUPTHER

## 2018-08-16 RX ORDER — SODIUM BICARBONATE 650 MG/1
650 TABLET ORAL
COMMUNITY
End: 2018-11-30

## 2018-08-16 RX ORDER — NEBIVOLOL 10 MG/1
10 TABLET ORAL DAILY
Qty: 90 TABLET | Refills: 1 | Status: SHIPPED | OUTPATIENT
Start: 2018-08-16 | End: 2018-08-24 | Stop reason: SDUPTHER

## 2018-08-16 RX ORDER — FUROSEMIDE 20 MG/1
20 TABLET ORAL DAILY
COMMUNITY
End: 2019-01-01

## 2018-08-16 RX ORDER — OMEPRAZOLE 20 MG/1
20 CAPSULE, DELAYED RELEASE ORAL DAILY
COMMUNITY
End: 2018-11-30

## 2018-08-16 NOTE — PROGRESS NOTES
Kevinefren Aguero 63 y.o.  CC:Follow-up; Diabetes (Type II, eye exam was 2 weeks ago Dr Lyon ); Hypertension; Hypothyroidism; Hyperlipidemia; and Vitamin D Deficiency      Kiana: Follow-up; Diabetes (Type II, eye exam was 2 weeks ago Dr Lyon ); Hypertension; Hypothyroidism; Hyperlipidemia; and Vitamin D Deficiency    Continues to lose weight - has lost about 29 lbs since May  Blood sugar and 90 day average sugar reviewed  Results for orders placed or performed in visit on 08/16/18   POC Glycosylated Hemoglobin (Hb A1C)   Result Value Ref Range    Hemoglobin A1C 6.1 %   POC Glucose Fingerstick   Result Value Ref Range    Glucose 143 (A) 70 - 130 mg/dL     bp is good  Is in prediabetic range now off of all medications  bp is good  Neuropathy is stable on gabapentin  Just back from cruise to [place]  Is going to have excess skin corrected via plastic surgery  gfr has been stable at 18 but is in evaluation for renal transplant  Is scheduled for colonoscopy and we recommended gatorade or G2 for adequate hydration    Allergies   Allergen Reactions   • Sulfa Antibiotics Other (See Comments)     Patient does not remember the reaction symptoms       Current Outpatient Prescriptions:   •  allopurinol (ZYLOPRIM) 100 MG tablet, Take 1 tablet by mouth Daily. For: Gout, Disp: 90 tablet, Rfl: 3  •  amLODIPine (NORVASC) 5 MG tablet, Take 1 tablet by mouth Daily., Disp: 90 tablet, Rfl: 1  •  atorvastatin (LIPITOR) 20 MG tablet, Take 1 tablet by mouth Daily., Disp: 90 tablet, Rfl: 1  •  buPROPion (WELLBUTRIN) 100 MG tablet, TAKE 1 TABLET BY MOUTH 3  TIMES A DAY, Disp: 270 tablet, Rfl: 2  •  Cholecalciferol (VITAMIN D3) 60260 units tablet, Take  by mouth 1 (One) Time Per Week., Disp: , Rfl:   •  doxazosin (CARDURA) 2 MG tablet, Take 1 tablet by mouth 2 (Two) Times a Day., Disp: 180 tablet, Rfl: 1  •  furosemide (LASIX) 20 MG tablet, Take 20 mg by mouth Daily., Disp: , Rfl:   •  nebivolol (BYSTOLIC) 10 MG tablet, Take 1 tablet by mouth  Daily., Disp: 90 tablet, Rfl: 1  •  omeprazole (priLOSEC) 20 MG capsule, Take 20 mg by mouth Daily., Disp: , Rfl:   •  sodium bicarbonate 650 MG tablet, Take 650 mg by mouth 4 (Four) Times a Day. Take 2 tabs BID, Disp: , Rfl:   •  clotrimazole-betamethasone (LOTRISONE) 1-0.05 % cream, Apply  topically 2 (two) times a day., Disp: 45 g, Rfl: 2  •  gabapentin (NEURONTIN) 800 MG tablet, Take 1 tablet by mouth 3 (Three) Times a Day. For: Gout (Patient taking differently: Take 400 mg by mouth Daily. For: Gout), Disp: 270 tablet, Rfl: 1  •  glucose blood (ONE TOUCH ULTRA TEST) test strip, 4 (four) times a day. TEST; For: Diabetes mellitus, Disp: , Rfl:   •  Insulin Pen Needle (B-D ULTRAFINE III SHORT PEN) 31G X 8 MM misc, 4 (four) times a day. USE qid; For: Diabetes mellitus, Disp: , Rfl:   •  levothyroxine (SYNTHROID, LEVOTHROID) 100 MCG tablet, Take 1 tablet by mouth Daily., Disp: 30 tablet, Rfl: 5  •  losartan (COZAAR) 100 MG tablet, Take 1 tablet by mouth Daily., Disp: 90 tablet, Rfl: 2  •  ONETOUCH DELICA LANCETS 33G misc, Use for blood sugar testing twice daily; For: Benign essential hypertension, Disp: , Rfl:   •  Sod Picosulfate-Mag Ox-Cit Acd 10-3.5-12 MG-GM -GM/160ML solution, Take 1 kit by mouth Take As Directed. Follow instructions that were mailed to your home. 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]   • Hematuria [R31.9]   • Hyperkalemia [E87.5]   • Metabolic disorder [E88.9]     Overview Note:     syndrome - renal u/s neg for sofia        • Onychomycosis [B35.1]   • Sciatica [M54.30]   • Benign essential hypertension [I10]     Overview Note:     Impression: 12/21/2015 - increase doxazosin- goal bp 140/80 or less  Impression: 09/10/2015 - bp is good - continue to monitor creatinine  Impression: 02/20/2015 - bp is high- repeat 162/84  more edema  has recently had diuretic dose adjusted  will continue to follow and he will discuss with nephrology if no better by appt with them  Impression: 10/09/2014 - bp is higher- increase amlodipine to bid; Description: edema improved.     • Reactive depression [F32.9]   • Type 2 diabetes mellitus without complication, with long-term current use of insulin (CMS/[facility]) [E11.9, Z79.4]     Overview Note:     Impression: 12/21/2015 - reviewed hgn a1c with him   add 4 u of humalog if blood sugar premeal is over 200   samples and refills provided  Impression: 09/10/2015 - blood sugar 185, hgn a1c 7.7%  is up to date with preventive care  goals reviewed and emphasis on better fitness overall, building a regimen of regular exercise  he will work on this  continue f/u with nephrology  Impression: 05/28/2015 - blood sugar is 213, hgn a1c 8.7%  continue current medications  checking sugars bid  is adjusting insulin for higher sugars  did not bring meter  discussed diet, weight loss, referral for compulsive eating to [facility]  update lab work   has f/u podiatry and nephrology  will schedule updated eye exam  Impression: 02/20/2015 - blood sugar 89, hgn a1c 7.0 (in acceptable range).  continue to try to increase activity and work on weight loss  biggest risk is morbid obesity  is up to date with eye exam  nephrology appt coming up  has vascular changes feet- preulcerative callus left gt toe medially.  Cautioned him about proper foot care and use of lotion and pumice stone  Impression: 10/09/2014 - reviewed hgn a1c   doing well with diet, working on weight loss surgery  is up to date with eye exam  no neuropathy  ur alb pos- sees  nephrology  foot care good- saw podiatry 2 weeks ago  Impression: 07/09/2014 - blood sugar is 113, hgn a1c 7.8% (average 170 or so, down from last a1c 8.3%)  commended efforts with diet and weight loss, continue to work on losing more weight (target 30-40 lbs)  strategies for diet, exercise reviewed and download of glucometer discussed  planning to continue to work with dietician  Impression: 04/25/2014 - blood sugar 148, hgn a1c 8.3% (average 190 or so)  discussed current regimen and will transition him to lantus + humalog for better flexibility with insulin doses  start lantus 40 u daily and humalog 1 u per 8 gm carb  grids provided and have asked him to email sugars in 1-2 weeks and gave information re insulin pump;      • Diabetic retinopathy (CMS/HCC) [E11.319]     Overview Note:     Description: 10/15 worsening - Lyon referred to [facility]     • Edema [R60.9]   • Gout [M10.9]   • Mixed hyperlipidemia [E78.2]     Overview Note:     Impression: 12/21/2015 - reviewed flp - Dr Gates increased statin   more achy- discussed adding co q 10  Impression: 09/10/2015 - reviewed recent lipid panel  no changes for now  see above  Impression: 05/28/2015 - check flp  Impression: 02/20/2015 - high tg- continue current medication and repeat fasting with physical  Impression: 10/09/2014 - reviewed flp;      • Acquired hypothyroidism [E03.9]     Overview Note:     Impression: 12/21/2015 - check tfts - gave order  Impression: 09/10/2015 - tsh high normal  Impression: 05/28/2015 - check tsh  Impression: 02/20/2015 - he went off thyroid supplement ( was taking it at breakfast and then stopped because he was told he should take it before the meal).  Reinforced need for him to take this daily.  He will resume and update blood work when he has appt with Dr Gates.  Impression: 10/09/2014 - check tft;      • Morbid obesity due to excess calories (CMS/HCC) [E66.01]     Overview Note:     Impression: 04/25/2014 - over 30 min  counselling- discussed weight loss surgery vs diet, will try tracking calories with my fitness pal;      • Primary osteoarthritis involving multiple joints [M15.0]     Overview Note:     Impression: 09/10/2015 - discussed weight loss;      • Renal insufficiency [N28.9]     Overview Note:     Impression: 09/10/2015 - continue f/u nephrology;      • Sleep apnea [G47.30]   • Cobalamin deficiency [E53.8]     Review of Systems   Constitutional: Negative for activity change, appetite change, chills, diaphoresis, fatigue, fever and unexpected weight change.   HENT: Negative for congestion, dental problem, drooling, ear discharge, ear pain, facial swelling, hearing loss, mouth sores, nosebleeds, postnasal drip, rhinorrhea, sinus pressure, sneezing, sore throat, tinnitus, trouble swallowing and voice change.    Eyes: Negative for photophobia, pain, discharge, redness, itching and visual disturbance.   Respiratory: Negative for apnea, cough, choking, chest tightness, shortness of breath, wheezing and stridor.    Cardiovascular: Negative for chest pain, palpitations and leg swelling.   Gastrointestinal: Negative for abdominal distention, abdominal pain, anal bleeding, blood in stool, constipation, diarrhea, nausea, rectal pain and vomiting.   Endocrine: Negative for cold intolerance, heat intolerance, polydipsia, polyphagia and polyuria.   Genitourinary: Negative for decreased urine volume, difficulty urinating, dysuria, enuresis, flank pain, frequency, genital sores, hematuria and urgency.   Musculoskeletal: Negative for arthralgias, back pain, gait problem, joint swelling, myalgias, neck pain and neck stiffness.   Skin: Negative for color change, pallor, rash and wound.   Allergic/Immunologic: Negative for environmental allergies, food allergies and immunocompromised state.   Neurological: Negative for dizziness, tremors, seizures, syncope, facial asymmetry, speech difficulty, weakness, light-headedness, numbness and  "headaches.   Hematological: Negative for adenopathy. Does not bruise/bleed easily.   Psychiatric/Behavioral: Negative for agitation, behavioral problems, confusion, decreased concentration, dysphoric mood, hallucinations, self-injury, sleep disturbance and suicidal ideas. The patient is not nervous/anxious and is not hyperactive.      Social History     Social History   • Marital status:      Spouse name: N/A   • Number of children: N/A   • Years of education: N/A     Occupational History   • Not on file.     Social History Main Topics   • Smoking status: Never Smoker   • Smokeless tobacco: Never Used   • Alcohol use 0.6 oz/week     1 Cans of beer per week      Comment: 2 times per month   • Drug use: No   • Sexual activity: Defer     Other Topics Concern   • Not on file     Social History Narrative   • No narrative on file     Family History   Problem Relation Age of Onset   • No Known Problems Mother    • Diabetes Father    • Hypertension Father    • Cancer Father      /68   Pulse 59   Ht 182.9 cm (72\")   Wt 103 kg (228 lb)   SpO2 98%   BMI 30.92 kg/m²   Physical Exam   Constitutional: He is oriented to person, place, and time. He appears well-developed and well-nourished.   HENT:   Head: Normocephalic and atraumatic.   Nose: Nose normal.   Mouth/Throat: Oropharynx is clear and moist.   Eyes: Pupils are equal, round, and reactive to light. Conjunctivae, EOM and lids are normal.   Neck: Trachea normal and normal range of motion. Neck supple. Carotid bruit is not present. No tracheal deviation present. No thyroid mass and no thyromegaly present.   Cardiovascular: Normal rate, regular rhythm, normal heart sounds and intact distal pulses.  Exam reveals no gallop and no friction rub.    No murmur heard.  Pulmonary/Chest: Effort normal and breath sounds normal. No respiratory distress. He has no wheezes.   Musculoskeletal: Normal range of motion. He exhibits no edema or deformity.    Kevin had a " diabetic foot exam performed today.   During the foot exam he had a monofilament test performed.    Neurological Sensory Findings - Unaltered hot/cold right ankle/foot discrimination and unaltered hot/cold left ankle/foot discrimination. Altered sharp/dull right ankle/foot discrimination and altered sharp/dull left ankle/foot discrimination. No right ankle/foot altered proprioception and no left ankle/foot altered proprioception  Vascular Status -  His right foot exhibits normal foot vasculature  and no edema. His left foot exhibits normal foot vasculature  and no edema.  Skin Integrity  -  His right foot skin is intact.His left foot skin is intact..  Lymphadenopathy:     He has no cervical adenopathy.   Neurological: He is alert and oriented to person, place, and time. He has normal reflexes. He displays normal reflexes. No cranial nerve deficit.   Skin: Skin is warm and dry. No rash noted. No cyanosis or erythema. Nails show no clubbing.   Psychiatric: He has a normal mood and affect. His speech is normal and behavior is normal. Judgment and thought content normal. Cognition and memory are normal.   Nursing note and vitals reviewed.    Results for orders placed or performed in visit on 04/02/18   Comprehensive Metabolic Panel   Result Value Ref Range    Glucose 133 (H) 70 - 100 mg/dL    BUN 46 (H) 9 - 23 mg/dL    Creatinine 3.50 (H) 0.60 - 1.30 mg/dL    Sodium 144 132 - 146 mmol/L    Potassium 4.1 3.5 - 5.5 mmol/L    Chloride 111 (H) 99 - 109 mmol/L    CO2 25.0 20.0 - 31.0 mmol/L    Calcium 8.8 8.7 - 10.4 mg/dL    Total Protein 5.2 (L) 5.7 - 8.2 g/dL    Albumin 3.00 (L) 3.20 - 4.80 g/dL    ALT (SGPT) 52 (H) 7 - 40 U/L    AST (SGOT) 33 0 - 33 U/L    Alkaline Phosphatase 99 25 - 100 U/L    Total Bilirubin 0.6 0.3 - 1.2 mg/dL    eGFR Non African Amer 18 (L) >60 mL/min/1.73    Globulin 2.2 gm/dL    A/G Ratio 1.4 (L) 1.5 - 2.5 g/dL    BUN/Creatinine Ratio 13.1 7.0 - 25.0    Anion Gap 8.0 3.0 - 11.0 mmol/L   PSA Screen    Result Value Ref Range    PSA 0.540 0.000 - 4.000 ng/mL   CBC (No Diff)   Result Value Ref Range    WBC 5.50 3.50 - 10.80 10*3/mm3    RBC 3.17 (L) 4.20 - 5.76 10*6/mm3    Hemoglobin 9.9 (L) 13.1 - 17.5 g/dL    Hematocrit 30.0 (L) 38.9 - 50.9 %    MCV 94.6 80.0 - 99.0 fL    MCH 31.2 (H) 27.0 - 31.0 pg    MCHC 33.0 32.0 - 36.0 g/dL    RDW 13.7 11.3 - 14.5 %    RDW-SD 47.7 37.0 - 54.0 fl    MPV 9.7 6.0 - 12.0 fL    Platelets 191 150 - 450 10*3/mm3     Kevin was seen today for follow-up, diabetes, hypertension, hypothyroidism, hyperlipidemia and vitamin d deficiency.    Diagnoses and all orders for this visit:    Type 2 diabetes mellitus without complication, with long-term current use of insulin (CMS/[facility])  -     POC Glycosylated Hemoglobin (Hb A1C)  -     POC Glucose Fingerstick    Other orders  -     atorvastatin (LIPITOR) 20 MG tablet; Take 1 tablet by mouth Daily.  -     nebivolol (BYSTOLIC) 10 MG tablet; Take 1 tablet by mouth Daily.      Problem List Items Addressed This Visit        Cardiovascular and Mediastinum    Mixed hyperlipidemia     On atorvastatin- low fat diet  Check flp          Relevant Medications    atorvastatin (LIPITOR) 20 MG tablet    Other Relevant Orders    Lipid Panel    Benign essential hypertension     bp is well controlled         Relevant Medications    furosemide (LASIX) 20 MG tablet    nebivolol (BYSTOLIC) 10 MG tablet    Other Relevant Orders    CBC Auto Differential       Endocrine    Type 2 diabetes mellitus without complication, with long-term current use of insulin (CMS/[facility]) - Primary     Blood sugar and 90 day average sugar reviewed  Results for orders placed or performed in visit on 08/16/18   POC Glycosylated Hemoglobin (Hb A1C)   Result Value Ref Range    Hemoglobin A1C 6.1 %   POC Glucose Fingerstick   Result Value Ref Range    Glucose 143 (A) 70 - 130 mg/dL     Average sugar is 120  utd with eye exam  esrd - getting ready for transplant  Neuropathy stable on gabapentin   F/u  6 months          Relevant Orders    POC Glycosylated Hemoglobin (Hb A1C) (Completed)    POC Glucose Fingerstick (Completed)    Comprehensive Metabolic Panel    Acquired hypothyroidism     Check tsh - needs refill          Relevant Medications    nebivolol (BYSTOLIC) 10 MG tablet    Other Relevant Orders    TSH        Return in about 6 months (around 2/16/2019) for Recheck 30 min .    Valentina Rodriguez MA  Signed Anuradha Charles MD

## 2018-08-16 NOTE — ASSESSMENT & PLAN NOTE
Blood sugar and 90 day average sugar reviewed  Results for orders placed or performed in visit on 08/16/18   POC Glycosylated Hemoglobin (Hb A1C)   Result Value Ref Range    Hemoglobin A1C 6.1 %   POC Glucose Fingerstick   Result Value Ref Range    Glucose 143 (A) 70 - 130 mg/dL     Average sugar is 120  utd with eye exam  esrd - getting ready for transplant  Neuropathy stable on gabapentin   F/u 6 months

## 2018-08-19 ENCOUNTER — TELEPHONE (OUTPATIENT)
Dept: INTERNAL MEDICINE | Facility: CLINIC | Age: 63
End: 2018-08-19

## 2018-08-19 RX ORDER — LEVOTHYROXINE SODIUM 0.12 MG/1
125 TABLET ORAL DAILY
Qty: 90 TABLET | Refills: 1 | Status: SHIPPED | OUTPATIENT
Start: 2018-08-19 | End: 2019-01-01 | Stop reason: SDUPTHER

## 2018-08-20 ENCOUNTER — OUTSIDE FACILITY SERVICE (OUTPATIENT)
Dept: GASTROENTEROLOGY | Facility: CLINIC | Age: 63
End: 2018-08-20

## 2018-08-20 ENCOUNTER — LAB REQUISITION (OUTPATIENT)
Dept: LAB | Facility: HOSPITAL | Age: 63
End: 2018-08-20

## 2018-08-20 DIAGNOSIS — Z12.11 ENCOUNTER FOR SCREENING FOR MALIGNANT NEOPLASM OF COLON: ICD-10-CM

## 2018-08-20 PROCEDURE — 88305 TISSUE EXAM BY PATHOLOGIST: CPT | Performed by: INTERNAL MEDICINE

## 2018-08-20 PROCEDURE — G0500 MOD SEDAT ENDO SERVICE >5YRS: HCPCS | Performed by: INTERNAL MEDICINE

## 2018-08-20 PROCEDURE — 45385 COLONOSCOPY W/LESION REMOVAL: CPT | Performed by: INTERNAL MEDICINE

## 2018-08-21 LAB
CYTO UR: NORMAL
LAB AP CASE REPORT: NORMAL
LAB AP CLINICAL INFORMATION: NORMAL
PATH REPORT.FINAL DX SPEC: NORMAL
PATH REPORT.GROSS SPEC: NORMAL

## 2018-08-24 ENCOUNTER — TELEPHONE (OUTPATIENT)
Dept: INTERNAL MEDICINE | Facility: CLINIC | Age: 63
End: 2018-08-24

## 2018-08-24 RX ORDER — NEBIVOLOL 10 MG/1
10 TABLET ORAL DAILY
Qty: 15 TABLET | Refills: 0 | Status: SHIPPED | OUTPATIENT
Start: 2018-08-24 | End: 2018-08-31 | Stop reason: SDUPTHER

## 2018-08-24 NOTE — TELEPHONE ENCOUNTER
----- Message from Cyndi Meredith sent at 8/24/2018  2:24 PM EDT -----  Patient called states he ordered refill of Bystolic 10mg thru mail order but he is out of med and needs a 10 day supply to local pharmacy to last until that comes in.  Send to [facility].  Patient can be reached at [phone] if needed.

## 2018-08-31 DIAGNOSIS — F32.9 REACTIVE DEPRESSION: ICD-10-CM

## 2018-08-31 RX ORDER — AMLODIPINE BESYLATE 5 MG/1
5 TABLET ORAL DAILY
Qty: 90 TABLET | Refills: 1 | Status: SHIPPED | OUTPATIENT
Start: 2018-08-31 | End: 2019-01-01

## 2018-08-31 RX ORDER — VENLAFAXINE 75 MG/1
37.5 TABLET ORAL 2 TIMES DAILY
Qty: 45 TABLET | Refills: 1 | Status: SHIPPED | OUTPATIENT
Start: 2018-08-31 | End: 2018-09-18 | Stop reason: SDUPTHER

## 2018-08-31 RX ORDER — NEBIVOLOL 10 MG/1
10 TABLET ORAL DAILY
Qty: 90 TABLET | Refills: 1 | Status: SHIPPED | OUTPATIENT
Start: 2018-08-31 | End: 2018-01-01 | Stop reason: SDUPTHER

## 2018-08-31 RX ORDER — ALLOPURINOL 100 MG/1
100 TABLET ORAL DAILY
Qty: 90 TABLET | Refills: 1 | Status: SHIPPED | OUTPATIENT
Start: 2018-08-31 | End: 2018-01-01 | Stop reason: SDUPTHER

## 2018-09-12 ENCOUNTER — CLINICAL SUPPORT (OUTPATIENT)
Dept: INTERNAL MEDICINE | Facility: CLINIC | Age: 63
End: 2018-09-12

## 2018-09-12 DIAGNOSIS — E53.8 COBALAMIN DEFICIENCY: ICD-10-CM

## 2018-09-12 PROCEDURE — 96372 THER/PROPH/DIAG INJ SC/IM: CPT | Performed by: INTERNAL MEDICINE

## 2018-09-12 RX ADMIN — CYANOCOBALAMIN 1000 MCG: 1000 INJECTION, SOLUTION INTRAMUSCULAR; SUBCUTANEOUS at 14:30

## 2018-09-18 DIAGNOSIS — F32.9 REACTIVE DEPRESSION: ICD-10-CM

## 2018-09-18 RX ORDER — VENLAFAXINE 75 MG/1
TABLET ORAL
Qty: 45 TABLET | Refills: 0 | Status: SHIPPED | OUTPATIENT
Start: 2018-09-18 | End: 2018-11-24 | Stop reason: SDUPTHER

## 2018-10-02 ENCOUNTER — APPOINTMENT (OUTPATIENT)
Dept: ONCOLOGY | Facility: HOSPITAL | Age: 63
End: 2018-10-02

## 2018-10-02 ENCOUNTER — OFFICE VISIT (OUTPATIENT)
Dept: INTERNAL MEDICINE | Facility: CLINIC | Age: 63
End: 2018-10-02

## 2018-10-02 VITALS
DIASTOLIC BLOOD PRESSURE: 68 MMHG | WEIGHT: 228 LBS | SYSTOLIC BLOOD PRESSURE: 134 MMHG | BODY MASS INDEX: 30.92 KG/M2 | RESPIRATION RATE: 21 BRPM | HEART RATE: 82 BPM

## 2018-10-02 DIAGNOSIS — Z79.4 TYPE 2 DIABETES MELLITUS WITHOUT COMPLICATION, WITH LONG-TERM CURRENT USE OF INSULIN (HCC): ICD-10-CM

## 2018-10-02 DIAGNOSIS — L84 CALLUS: ICD-10-CM

## 2018-10-02 DIAGNOSIS — G47.30 SLEEP APNEA, UNSPECIFIED TYPE: ICD-10-CM

## 2018-10-02 DIAGNOSIS — M54.30 SCIATICA, UNSPECIFIED LATERALITY: ICD-10-CM

## 2018-10-02 DIAGNOSIS — M15.9 PRIMARY OSTEOARTHRITIS INVOLVING MULTIPLE JOINTS: ICD-10-CM

## 2018-10-02 DIAGNOSIS — R31.9 HEMATURIA, UNSPECIFIED TYPE: Primary | ICD-10-CM

## 2018-10-02 DIAGNOSIS — E78.2 MIXED HYPERLIPIDEMIA: ICD-10-CM

## 2018-10-02 DIAGNOSIS — I10 BENIGN ESSENTIAL HYPERTENSION: Primary | ICD-10-CM

## 2018-10-02 DIAGNOSIS — F32.9 REACTIVE DEPRESSION: ICD-10-CM

## 2018-10-02 DIAGNOSIS — M79.602 LEFT ARM PAIN: ICD-10-CM

## 2018-10-02 DIAGNOSIS — E88.9 METABOLIC DISORDER: ICD-10-CM

## 2018-10-02 DIAGNOSIS — M27.9 DISEASE OF JAW: ICD-10-CM

## 2018-10-02 DIAGNOSIS — N28.9 RENAL INSUFFICIENCY: ICD-10-CM

## 2018-10-02 DIAGNOSIS — B35.1 ONYCHOMYCOSIS: ICD-10-CM

## 2018-10-02 DIAGNOSIS — M25.519 ARTHRALGIA OF SHOULDER, UNSPECIFIED LATERALITY: ICD-10-CM

## 2018-10-02 DIAGNOSIS — E11.9 TYPE 2 DIABETES MELLITUS WITHOUT COMPLICATION, WITH LONG-TERM CURRENT USE OF INSULIN (HCC): ICD-10-CM

## 2018-10-02 DIAGNOSIS — E53.8 COBALAMIN DEFICIENCY: ICD-10-CM

## 2018-10-02 DIAGNOSIS — R60.9 EDEMA, UNSPECIFIED TYPE: ICD-10-CM

## 2018-10-02 DIAGNOSIS — E03.9 ACQUIRED HYPOTHYROIDISM: ICD-10-CM

## 2018-10-02 DIAGNOSIS — Z23 NEED FOR STREPTOCOCCUS PNEUMONIAE VACCINATION: ICD-10-CM

## 2018-10-02 DIAGNOSIS — E66.01 MORBID OBESITY DUE TO EXCESS CALORIES (HCC): ICD-10-CM

## 2018-10-02 DIAGNOSIS — R05.9 COUGH: ICD-10-CM

## 2018-10-02 DIAGNOSIS — E87.5 HYPERKALEMIA: ICD-10-CM

## 2018-10-02 DIAGNOSIS — Z23 NEED FOR INFLUENZA VACCINATION: ICD-10-CM

## 2018-10-02 DIAGNOSIS — M10.9 GOUT, UNSPECIFIED CAUSE, UNSPECIFIED CHRONICITY, UNSPECIFIED SITE: ICD-10-CM

## 2018-10-02 DIAGNOSIS — E11.3293 MILD NONPROLIFERATIVE DIABETIC RETINOPATHY OF BOTH EYES WITHOUT MACULAR EDEMA ASSOCIATED WITH TYPE 2 DIABETES MELLITUS (HCC): ICD-10-CM

## 2018-10-02 PROCEDURE — G0009 ADMIN PNEUMOCOCCAL VACCINE: HCPCS | Performed by: INTERNAL MEDICINE

## 2018-10-02 PROCEDURE — 99214 OFFICE O/P EST MOD 30 MIN: CPT | Performed by: INTERNAL MEDICINE

## 2018-10-02 PROCEDURE — 90670 PCV13 VACCINE IM: CPT | Performed by: INTERNAL MEDICINE

## 2018-10-02 PROCEDURE — G0008 ADMIN INFLUENZA VIRUS VAC: HCPCS | Performed by: INTERNAL MEDICINE

## 2018-10-02 PROCEDURE — 90662 IIV NO PRSV INCREASED AG IM: CPT | Performed by: INTERNAL MEDICINE

## 2018-10-02 NOTE — PROGRESS NOTES
Subjective   Kevin Aguero is a 63 y.o. male.     History of Present Illness   For follow up of  HTN on meds, no headaches, sob or chest pain.  NIDDM, since losing weight he is off all diabetic medication.  Last A1C = 6.1.  Obesity.  He has lost 150 lbs since surgery.  Renal insufficiency.  Creatinine is up to 4.  He is going on the transplant list.  Depression is doing well on meds.    The following portions of the patient's history were reviewed and updated as appropriate: allergies, current medications, past medical history and problem list.    Review of Systems   Constitutional: Negative.    HENT: Negative.    Eyes: Negative.    Respiratory: Negative.  Negative for cough, chest tightness, shortness of breath and wheezing.    Cardiovascular: Negative.  Negative for chest pain, palpitations and leg swelling.   Gastrointestinal: Negative.  Negative for abdominal distention, abdominal pain and anal bleeding.        Lot of excess skin.   Genitourinary: Negative.    Neurological: Negative for headache.       Objective   Physical Exam   Constitutional: He appears well-developed and well-nourished.   He looks good.   Neck: Normal range of motion. Neck supple.   Cardiovascular: Normal rate, regular rhythm and normal heart sounds.  Exam reveals no gallop and no friction rub.    No murmur heard.  Pulmonary/Chest: Effort normal and breath sounds normal. No respiratory distress. He has no wheezes. He has no rales. He exhibits no tenderness.   Abdominal: Soft. Bowel sounds are normal. He exhibits no distension and no mass. There is no tenderness. There is no guarding.   Nursing note and vitals reviewed.        Assessment/Plan   Kevin was seen today for benign essential hypertension.    Diagnoses and all orders for this visit:    Benign essential hypertension    Mixed hyperlipidemia    Morbid obesity due to excess calories (CMS/[facility])    Type 2 diabetes mellitus without complication, with long-term current use of insulin  (CMS/[facility])    Primary osteoarthritis involving multiple joints    Need for Streptococcus pneumoniae vaccination  -     Pneumococcal Conjugate Vaccine 13-Valent All    Renal insufficiency    All problems are stable, but for worsening renal function.  Same meds.  Recheck here 6 months.  Recent labs reviewed and were good but for creatinine.

## 2018-10-03 ENCOUNTER — INFUSION (OUTPATIENT)
Dept: ONCOLOGY | Facility: HOSPITAL | Age: 63
End: 2018-10-03

## 2018-10-03 VITALS
DIASTOLIC BLOOD PRESSURE: 67 MMHG | TEMPERATURE: 97.6 F | BODY MASS INDEX: 31.02 KG/M2 | HEART RATE: 63 BPM | SYSTOLIC BLOOD PRESSURE: 141 MMHG | WEIGHT: 229 LBS | HEIGHT: 72 IN | RESPIRATION RATE: 16 BRPM

## 2018-10-03 DIAGNOSIS — E78.2 MIXED HYPERLIPIDEMIA: ICD-10-CM

## 2018-10-03 DIAGNOSIS — N28.9 RENAL INSUFFICIENCY: Primary | ICD-10-CM

## 2018-10-03 DIAGNOSIS — M15.9 PRIMARY OSTEOARTHRITIS INVOLVING MULTIPLE JOINTS: ICD-10-CM

## 2018-10-03 DIAGNOSIS — D63.1 ANEMIA OF CHRONIC RENAL FAILURE, STAGE 5 (HCC): ICD-10-CM

## 2018-10-03 DIAGNOSIS — E11.3293 MILD NONPROLIFERATIVE DIABETIC RETINOPATHY OF BOTH EYES WITHOUT MACULAR EDEMA ASSOCIATED WITH TYPE 2 DIABETES MELLITUS (HCC): ICD-10-CM

## 2018-10-03 DIAGNOSIS — E53.8 COBALAMIN DEFICIENCY: ICD-10-CM

## 2018-10-03 DIAGNOSIS — R31.9 HEMATURIA, UNSPECIFIED TYPE: ICD-10-CM

## 2018-10-03 DIAGNOSIS — E66.01 MORBID OBESITY DUE TO EXCESS CALORIES (HCC): ICD-10-CM

## 2018-10-03 DIAGNOSIS — N18.5 ANEMIA OF CHRONIC RENAL FAILURE, STAGE 5 (HCC): ICD-10-CM

## 2018-10-03 DIAGNOSIS — E03.9 ACQUIRED HYPOTHYROIDISM: ICD-10-CM

## 2018-10-03 DIAGNOSIS — E87.5 HYPERKALEMIA: ICD-10-CM

## 2018-10-03 DIAGNOSIS — R60.9 EDEMA, UNSPECIFIED TYPE: ICD-10-CM

## 2018-10-03 DIAGNOSIS — L84 CALLUS: ICD-10-CM

## 2018-10-03 DIAGNOSIS — E11.9 TYPE 2 DIABETES MELLITUS WITHOUT COMPLICATION, WITH LONG-TERM CURRENT USE OF INSULIN (HCC): ICD-10-CM

## 2018-10-03 DIAGNOSIS — M10.9 GOUT, UNSPECIFIED CAUSE, UNSPECIFIED CHRONICITY, UNSPECIFIED SITE: ICD-10-CM

## 2018-10-03 DIAGNOSIS — M54.30 SCIATICA, UNSPECIFIED LATERALITY: ICD-10-CM

## 2018-10-03 DIAGNOSIS — G47.30 SLEEP APNEA, UNSPECIFIED TYPE: ICD-10-CM

## 2018-10-03 DIAGNOSIS — M25.519 ARTHRALGIA OF SHOULDER, UNSPECIFIED LATERALITY: ICD-10-CM

## 2018-10-03 DIAGNOSIS — F32.9 REACTIVE DEPRESSION: ICD-10-CM

## 2018-10-03 DIAGNOSIS — Z79.4 TYPE 2 DIABETES MELLITUS WITHOUT COMPLICATION, WITH LONG-TERM CURRENT USE OF INSULIN (HCC): ICD-10-CM

## 2018-10-03 DIAGNOSIS — E88.9 METABOLIC DISORDER: ICD-10-CM

## 2018-10-03 DIAGNOSIS — M79.602 LEFT ARM PAIN: ICD-10-CM

## 2018-10-03 DIAGNOSIS — M27.9 DISEASE OF JAW: ICD-10-CM

## 2018-10-03 DIAGNOSIS — B35.1 ONYCHOMYCOSIS: ICD-10-CM

## 2018-10-03 DIAGNOSIS — R05.9 COUGH: ICD-10-CM

## 2018-10-03 LAB
CREAT BLD-MCNC: 3.94 MG/DL (ref 0.6–1.3)
FERRITIN SERPL-MCNC: 117 NG/ML (ref 22–322)
GFR SERPL CREATININE-BSD FRML MDRD: 15 ML/MIN/1.73
HCT VFR BLD AUTO: 27.4 % (ref 38.9–50.9)
HGB BLD-MCNC: 9.1 G/DL (ref 13.1–17.5)
IRON 24H UR-MRATE: 48 MCG/DL (ref 50–175)
IRON SATN MFR SERPL: 24 % (ref 20–50)
POTASSIUM BLD-SCNC: 4.4 MMOL/L (ref 3.5–5.5)
TIBC SERPL-MCNC: 202 MCG/DL (ref 250–450)

## 2018-10-03 PROCEDURE — 82728 ASSAY OF FERRITIN: CPT | Performed by: INTERNAL MEDICINE

## 2018-10-03 PROCEDURE — 96372 THER/PROPH/DIAG INJ SC/IM: CPT

## 2018-10-03 PROCEDURE — 82565 ASSAY OF CREATININE: CPT | Performed by: INTERNAL MEDICINE

## 2018-10-03 PROCEDURE — 85014 HEMATOCRIT: CPT | Performed by: INTERNAL MEDICINE

## 2018-10-03 PROCEDURE — 36415 COLL VENOUS BLD VENIPUNCTURE: CPT

## 2018-10-03 PROCEDURE — 63510000001 EPOETIN ALFA PER 1000 UNITS: Performed by: INTERNAL MEDICINE

## 2018-10-03 PROCEDURE — 84132 ASSAY OF SERUM POTASSIUM: CPT | Performed by: INTERNAL MEDICINE

## 2018-10-03 PROCEDURE — 83540 ASSAY OF IRON: CPT | Performed by: INTERNAL MEDICINE

## 2018-10-03 PROCEDURE — 85018 HEMOGLOBIN: CPT | Performed by: INTERNAL MEDICINE

## 2018-10-03 PROCEDURE — 83550 IRON BINDING TEST: CPT | Performed by: INTERNAL MEDICINE

## 2018-10-03 RX ADMIN — EPOETIN ALFA 20000 UNITS: 20000 SOLUTION INTRAVENOUS; SUBCUTANEOUS at 11:32

## 2018-10-10 ENCOUNTER — CLINICAL SUPPORT (OUTPATIENT)
Dept: INTERNAL MEDICINE | Facility: CLINIC | Age: 63
End: 2018-10-10

## 2018-10-10 ENCOUNTER — INFUSION (OUTPATIENT)
Dept: ONCOLOGY | Facility: HOSPITAL | Age: 63
End: 2018-10-10

## 2018-10-10 VITALS
HEIGHT: 72 IN | WEIGHT: 227 LBS | HEART RATE: 72 BPM | TEMPERATURE: 97.3 F | DIASTOLIC BLOOD PRESSURE: 73 MMHG | SYSTOLIC BLOOD PRESSURE: 144 MMHG | RESPIRATION RATE: 16 BRPM | BODY MASS INDEX: 30.75 KG/M2

## 2018-10-10 DIAGNOSIS — E66.01 MORBID OBESITY DUE TO EXCESS CALORIES (HCC): ICD-10-CM

## 2018-10-10 DIAGNOSIS — E53.8 COBALAMIN DEFICIENCY: ICD-10-CM

## 2018-10-10 DIAGNOSIS — N18.5 ANEMIA OF CHRONIC RENAL FAILURE, STAGE 5 (HCC): ICD-10-CM

## 2018-10-10 DIAGNOSIS — R60.9 EDEMA, UNSPECIFIED TYPE: ICD-10-CM

## 2018-10-10 DIAGNOSIS — E03.9 ACQUIRED HYPOTHYROIDISM: ICD-10-CM

## 2018-10-10 DIAGNOSIS — M54.30 SCIATICA, UNSPECIFIED LATERALITY: ICD-10-CM

## 2018-10-10 DIAGNOSIS — Z79.4 TYPE 2 DIABETES MELLITUS WITHOUT COMPLICATION, WITH LONG-TERM CURRENT USE OF INSULIN (HCC): ICD-10-CM

## 2018-10-10 DIAGNOSIS — N28.9 RENAL INSUFFICIENCY: Primary | ICD-10-CM

## 2018-10-10 DIAGNOSIS — M15.9 PRIMARY OSTEOARTHRITIS INVOLVING MULTIPLE JOINTS: ICD-10-CM

## 2018-10-10 DIAGNOSIS — E78.2 MIXED HYPERLIPIDEMIA: ICD-10-CM

## 2018-10-10 DIAGNOSIS — B35.1 ONYCHOMYCOSIS: ICD-10-CM

## 2018-10-10 DIAGNOSIS — R31.9 HEMATURIA, UNSPECIFIED TYPE: ICD-10-CM

## 2018-10-10 DIAGNOSIS — E11.3293 MILD NONPROLIFERATIVE DIABETIC RETINOPATHY OF BOTH EYES WITHOUT MACULAR EDEMA ASSOCIATED WITH TYPE 2 DIABETES MELLITUS (HCC): ICD-10-CM

## 2018-10-10 DIAGNOSIS — M25.519 ARTHRALGIA OF SHOULDER, UNSPECIFIED LATERALITY: ICD-10-CM

## 2018-10-10 DIAGNOSIS — L84 CALLUS: ICD-10-CM

## 2018-10-10 DIAGNOSIS — G47.30 SLEEP APNEA, UNSPECIFIED TYPE: ICD-10-CM

## 2018-10-10 DIAGNOSIS — E11.9 TYPE 2 DIABETES MELLITUS WITHOUT COMPLICATION, WITH LONG-TERM CURRENT USE OF INSULIN (HCC): ICD-10-CM

## 2018-10-10 DIAGNOSIS — D63.1 ANEMIA OF CHRONIC RENAL FAILURE, STAGE 5 (HCC): ICD-10-CM

## 2018-10-10 DIAGNOSIS — R05.9 COUGH: ICD-10-CM

## 2018-10-10 DIAGNOSIS — M27.9 DISEASE OF JAW: ICD-10-CM

## 2018-10-10 DIAGNOSIS — E87.5 HYPERKALEMIA: ICD-10-CM

## 2018-10-10 DIAGNOSIS — M79.602 LEFT ARM PAIN: ICD-10-CM

## 2018-10-10 DIAGNOSIS — E88.9 METABOLIC DISORDER: ICD-10-CM

## 2018-10-10 DIAGNOSIS — M10.9 GOUT, UNSPECIFIED CAUSE, UNSPECIFIED CHRONICITY, UNSPECIFIED SITE: ICD-10-CM

## 2018-10-10 DIAGNOSIS — F32.9 REACTIVE DEPRESSION: ICD-10-CM

## 2018-10-10 LAB
CREAT BLD-MCNC: 3.99 MG/DL (ref 0.6–1.3)
GFR SERPL CREATININE-BSD FRML MDRD: 15 ML/MIN/1.73
HCT VFR BLD AUTO: 32.5 % (ref 38.9–50.9)
HGB BLD-MCNC: 10.4 G/DL (ref 13.1–17.5)
POTASSIUM BLD-SCNC: 4.3 MMOL/L (ref 3.5–5.5)

## 2018-10-10 PROCEDURE — 96372 THER/PROPH/DIAG INJ SC/IM: CPT | Performed by: INTERNAL MEDICINE

## 2018-10-10 PROCEDURE — 63510000001 EPOETIN ALFA PER 1000 UNITS: Performed by: INTERNAL MEDICINE

## 2018-10-10 PROCEDURE — 96372 THER/PROPH/DIAG INJ SC/IM: CPT

## 2018-10-10 PROCEDURE — 85014 HEMATOCRIT: CPT | Performed by: INTERNAL MEDICINE

## 2018-10-10 PROCEDURE — 84132 ASSAY OF SERUM POTASSIUM: CPT | Performed by: INTERNAL MEDICINE

## 2018-10-10 PROCEDURE — 36415 COLL VENOUS BLD VENIPUNCTURE: CPT

## 2018-10-10 PROCEDURE — 82565 ASSAY OF CREATININE: CPT | Performed by: INTERNAL MEDICINE

## 2018-10-10 PROCEDURE — 85018 HEMOGLOBIN: CPT | Performed by: INTERNAL MEDICINE

## 2018-10-10 RX ADMIN — CYANOCOBALAMIN 1000 MCG: 1000 INJECTION, SOLUTION INTRAMUSCULAR; SUBCUTANEOUS at 14:06

## 2018-10-10 RX ADMIN — EPOETIN ALFA 20000 UNITS: 20000 SOLUTION INTRAVENOUS; SUBCUTANEOUS at 10:55

## 2018-10-17 ENCOUNTER — INFUSION (OUTPATIENT)
Dept: ONCOLOGY | Facility: HOSPITAL | Age: 63
End: 2018-10-17

## 2018-10-17 VITALS
BODY MASS INDEX: 30.2 KG/M2 | HEIGHT: 72 IN | TEMPERATURE: 97.9 F | RESPIRATION RATE: 16 BRPM | WEIGHT: 223 LBS | DIASTOLIC BLOOD PRESSURE: 71 MMHG | SYSTOLIC BLOOD PRESSURE: 174 MMHG | HEART RATE: 59 BPM

## 2018-10-17 DIAGNOSIS — N28.9 RENAL INSUFFICIENCY: Primary | ICD-10-CM

## 2018-10-17 DIAGNOSIS — E11.9 TYPE 2 DIABETES MELLITUS WITHOUT COMPLICATION, WITH LONG-TERM CURRENT USE OF INSULIN (HCC): ICD-10-CM

## 2018-10-17 DIAGNOSIS — R05.9 COUGH: ICD-10-CM

## 2018-10-17 DIAGNOSIS — M54.30 SCIATICA, UNSPECIFIED LATERALITY: ICD-10-CM

## 2018-10-17 DIAGNOSIS — L84 CALLUS: ICD-10-CM

## 2018-10-17 DIAGNOSIS — N18.5 ANEMIA OF CHRONIC RENAL FAILURE, STAGE 5 (HCC): ICD-10-CM

## 2018-10-17 DIAGNOSIS — E87.5 HYPERKALEMIA: ICD-10-CM

## 2018-10-17 DIAGNOSIS — Z79.4 TYPE 2 DIABETES MELLITUS WITHOUT COMPLICATION, WITH LONG-TERM CURRENT USE OF INSULIN (HCC): ICD-10-CM

## 2018-10-17 DIAGNOSIS — R31.9 HEMATURIA, UNSPECIFIED TYPE: ICD-10-CM

## 2018-10-17 DIAGNOSIS — G47.30 SLEEP APNEA, UNSPECIFIED TYPE: ICD-10-CM

## 2018-10-17 DIAGNOSIS — E66.01 MORBID OBESITY DUE TO EXCESS CALORIES (HCC): ICD-10-CM

## 2018-10-17 DIAGNOSIS — F32.9 REACTIVE DEPRESSION: ICD-10-CM

## 2018-10-17 DIAGNOSIS — M15.9 PRIMARY OSTEOARTHRITIS INVOLVING MULTIPLE JOINTS: ICD-10-CM

## 2018-10-17 DIAGNOSIS — M25.519 ARTHRALGIA OF SHOULDER, UNSPECIFIED LATERALITY: ICD-10-CM

## 2018-10-17 DIAGNOSIS — E11.3293 MILD NONPROLIFERATIVE DIABETIC RETINOPATHY OF BOTH EYES WITHOUT MACULAR EDEMA ASSOCIATED WITH TYPE 2 DIABETES MELLITUS (HCC): ICD-10-CM

## 2018-10-17 DIAGNOSIS — M27.9 DISEASE OF JAW: ICD-10-CM

## 2018-10-17 DIAGNOSIS — E88.9 METABOLIC DISORDER: ICD-10-CM

## 2018-10-17 DIAGNOSIS — E78.2 MIXED HYPERLIPIDEMIA: ICD-10-CM

## 2018-10-17 DIAGNOSIS — M10.9 GOUT, UNSPECIFIED CAUSE, UNSPECIFIED CHRONICITY, UNSPECIFIED SITE: ICD-10-CM

## 2018-10-17 DIAGNOSIS — E03.9 ACQUIRED HYPOTHYROIDISM: ICD-10-CM

## 2018-10-17 DIAGNOSIS — M79.602 LEFT ARM PAIN: ICD-10-CM

## 2018-10-17 DIAGNOSIS — E53.8 COBALAMIN DEFICIENCY: ICD-10-CM

## 2018-10-17 DIAGNOSIS — R60.9 EDEMA, UNSPECIFIED TYPE: ICD-10-CM

## 2018-10-17 DIAGNOSIS — D63.1 ANEMIA OF CHRONIC RENAL FAILURE, STAGE 5 (HCC): ICD-10-CM

## 2018-10-17 DIAGNOSIS — B35.1 ONYCHOMYCOSIS: ICD-10-CM

## 2018-10-17 LAB
CREAT BLD-MCNC: 3.98 MG/DL (ref 0.6–1.3)
GFR SERPL CREATININE-BSD FRML MDRD: 15 ML/MIN/1.73
HCT VFR BLD AUTO: 33.2 % (ref 38.9–50.9)
HGB BLD-MCNC: 10.6 G/DL (ref 13.1–17.5)
POTASSIUM BLD-SCNC: 3.9 MMOL/L (ref 3.5–5.5)

## 2018-10-17 PROCEDURE — 36415 COLL VENOUS BLD VENIPUNCTURE: CPT

## 2018-10-17 PROCEDURE — 63510000001 EPOETIN ALFA PER 1000 UNITS: Performed by: INTERNAL MEDICINE

## 2018-10-17 PROCEDURE — 96372 THER/PROPH/DIAG INJ SC/IM: CPT

## 2018-10-17 PROCEDURE — 85018 HEMOGLOBIN: CPT | Performed by: INTERNAL MEDICINE

## 2018-10-17 PROCEDURE — 84132 ASSAY OF SERUM POTASSIUM: CPT | Performed by: INTERNAL MEDICINE

## 2018-10-17 PROCEDURE — 82565 ASSAY OF CREATININE: CPT | Performed by: INTERNAL MEDICINE

## 2018-10-17 PROCEDURE — 85014 HEMATOCRIT: CPT | Performed by: INTERNAL MEDICINE

## 2018-10-17 RX ADMIN — EPOETIN ALFA 20000 UNITS: 20000 SOLUTION INTRAVENOUS; SUBCUTANEOUS at 11:47

## 2018-10-23 ENCOUNTER — TELEPHONE (OUTPATIENT)
Dept: INTERNAL MEDICINE | Facility: CLINIC | Age: 63
End: 2018-10-23

## 2018-10-23 DIAGNOSIS — E03.9 HYPOTHYROIDISM (ACQUIRED): Primary | ICD-10-CM

## 2018-10-23 NOTE — TELEPHONE ENCOUNTER
PT IS GOING TO HAVE BLOOD DRAWN TOMORROW AND WOULD LIKE FOR DR ROJO TO WRITE AN ORDER FOR HIS THYROID TESTING SO THAT HE CAN HAVE THAT DRAWN AS WELL. THE PT WOULD LIKE TO GET A CALL BACK -[phone]

## 2018-10-23 NOTE — TELEPHONE ENCOUNTER
Patient is due for thyroid tests after change of synthroid dosage  Pt will  lab order tomorrow to take to [facility]  Lab order created

## 2018-10-24 ENCOUNTER — INFUSION (OUTPATIENT)
Dept: ONCOLOGY | Facility: HOSPITAL | Age: 63
End: 2018-10-24

## 2018-10-24 VITALS
DIASTOLIC BLOOD PRESSURE: 64 MMHG | WEIGHT: 224 LBS | TEMPERATURE: 97.8 F | HEART RATE: 56 BPM | RESPIRATION RATE: 16 BRPM | SYSTOLIC BLOOD PRESSURE: 133 MMHG | HEIGHT: 72 IN | BODY MASS INDEX: 30.34 KG/M2

## 2018-10-24 DIAGNOSIS — M10.9 GOUT, UNSPECIFIED CAUSE, UNSPECIFIED CHRONICITY, UNSPECIFIED SITE: ICD-10-CM

## 2018-10-24 DIAGNOSIS — G47.30 SLEEP APNEA, UNSPECIFIED TYPE: ICD-10-CM

## 2018-10-24 DIAGNOSIS — M15.9 PRIMARY OSTEOARTHRITIS INVOLVING MULTIPLE JOINTS: ICD-10-CM

## 2018-10-24 DIAGNOSIS — E11.3293 MILD NONPROLIFERATIVE DIABETIC RETINOPATHY OF BOTH EYES WITHOUT MACULAR EDEMA ASSOCIATED WITH TYPE 2 DIABETES MELLITUS (HCC): ICD-10-CM

## 2018-10-24 DIAGNOSIS — Z79.4 TYPE 2 DIABETES MELLITUS WITHOUT COMPLICATION, WITH LONG-TERM CURRENT USE OF INSULIN (HCC): ICD-10-CM

## 2018-10-24 DIAGNOSIS — E88.9 METABOLIC DISORDER: ICD-10-CM

## 2018-10-24 DIAGNOSIS — M25.519 ARTHRALGIA OF SHOULDER, UNSPECIFIED LATERALITY: ICD-10-CM

## 2018-10-24 DIAGNOSIS — B35.1 ONYCHOMYCOSIS: ICD-10-CM

## 2018-10-24 DIAGNOSIS — F32.9 REACTIVE DEPRESSION: ICD-10-CM

## 2018-10-24 DIAGNOSIS — E66.01 MORBID OBESITY DUE TO EXCESS CALORIES (HCC): ICD-10-CM

## 2018-10-24 DIAGNOSIS — E11.9 TYPE 2 DIABETES MELLITUS WITHOUT COMPLICATION, WITH LONG-TERM CURRENT USE OF INSULIN (HCC): ICD-10-CM

## 2018-10-24 DIAGNOSIS — M54.30 SCIATICA, UNSPECIFIED LATERALITY: ICD-10-CM

## 2018-10-24 DIAGNOSIS — E03.9 ACQUIRED HYPOTHYROIDISM: ICD-10-CM

## 2018-10-24 DIAGNOSIS — L84 CALLUS: ICD-10-CM

## 2018-10-24 DIAGNOSIS — R60.9 EDEMA, UNSPECIFIED TYPE: ICD-10-CM

## 2018-10-24 DIAGNOSIS — R31.9 HEMATURIA, UNSPECIFIED TYPE: ICD-10-CM

## 2018-10-24 DIAGNOSIS — R05.9 COUGH: ICD-10-CM

## 2018-10-24 DIAGNOSIS — E53.8 COBALAMIN DEFICIENCY: ICD-10-CM

## 2018-10-24 DIAGNOSIS — M79.602 LEFT ARM PAIN: ICD-10-CM

## 2018-10-24 DIAGNOSIS — M27.9 DISEASE OF JAW: ICD-10-CM

## 2018-10-24 DIAGNOSIS — E78.2 MIXED HYPERLIPIDEMIA: ICD-10-CM

## 2018-10-24 DIAGNOSIS — E87.5 HYPERKALEMIA: ICD-10-CM

## 2018-10-24 DIAGNOSIS — N28.9 RENAL INSUFFICIENCY: ICD-10-CM

## 2018-10-24 LAB
CREAT BLD-MCNC: 4.07 MG/DL (ref 0.6–1.3)
GFR SERPL CREATININE-BSD FRML MDRD: 15 ML/MIN/1.73
HCT VFR BLD AUTO: 34.8 % (ref 38.9–50.9)
HGB BLD-MCNC: 11.2 G/DL (ref 13.1–17.5)
POTASSIUM BLD-SCNC: 3.9 MMOL/L (ref 3.5–5.5)
T4 FREE SERPL-MCNC: 0.91 NG/DL (ref 0.89–1.76)
TSH SERPL DL<=0.05 MIU/L-ACNC: 9.71 MIU/ML (ref 0.35–5.35)

## 2018-10-24 PROCEDURE — 82565 ASSAY OF CREATININE: CPT | Performed by: INTERNAL MEDICINE

## 2018-10-24 PROCEDURE — 84439 ASSAY OF FREE THYROXINE: CPT | Performed by: INTERNAL MEDICINE

## 2018-10-24 PROCEDURE — 36415 COLL VENOUS BLD VENIPUNCTURE: CPT

## 2018-10-24 PROCEDURE — 84443 ASSAY THYROID STIM HORMONE: CPT | Performed by: INTERNAL MEDICINE

## 2018-10-24 PROCEDURE — 84132 ASSAY OF SERUM POTASSIUM: CPT | Performed by: INTERNAL MEDICINE

## 2018-10-24 PROCEDURE — 85018 HEMOGLOBIN: CPT | Performed by: INTERNAL MEDICINE

## 2018-10-24 PROCEDURE — 85014 HEMATOCRIT: CPT | Performed by: INTERNAL MEDICINE

## 2018-10-25 LAB
T4 FREE SERPL-MCNC: 0.98 NG/DL (ref 0.82–1.77)
TSH SERPL DL<=0.005 MIU/L-ACNC: 13.07 UIU/ML (ref 0.45–4.5)

## 2018-10-31 ENCOUNTER — APPOINTMENT (OUTPATIENT)
Dept: ONCOLOGY | Facility: HOSPITAL | Age: 63
End: 2018-10-31

## 2018-11-21 ENCOUNTER — INFUSION (OUTPATIENT)
Dept: ONCOLOGY | Facility: HOSPITAL | Age: 63
End: 2018-11-21

## 2018-11-21 VITALS
HEART RATE: 58 BPM | BODY MASS INDEX: 30.75 KG/M2 | SYSTOLIC BLOOD PRESSURE: 118 MMHG | WEIGHT: 227 LBS | TEMPERATURE: 97.6 F | HEIGHT: 72 IN | DIASTOLIC BLOOD PRESSURE: 56 MMHG | RESPIRATION RATE: 16 BRPM

## 2018-11-21 DIAGNOSIS — E87.5 HYPERKALEMIA: ICD-10-CM

## 2018-11-21 DIAGNOSIS — Z79.4 TYPE 2 DIABETES MELLITUS WITHOUT COMPLICATION, WITH LONG-TERM CURRENT USE OF INSULIN (HCC): ICD-10-CM

## 2018-11-21 DIAGNOSIS — N18.5 ANEMIA OF CHRONIC RENAL FAILURE, STAGE 5 (HCC): ICD-10-CM

## 2018-11-21 DIAGNOSIS — M25.519 ARTHRALGIA OF SHOULDER, UNSPECIFIED LATERALITY: ICD-10-CM

## 2018-11-21 DIAGNOSIS — B35.1 ONYCHOMYCOSIS: ICD-10-CM

## 2018-11-21 DIAGNOSIS — E88.9 METABOLIC DISORDER: ICD-10-CM

## 2018-11-21 DIAGNOSIS — E03.9 ACQUIRED HYPOTHYROIDISM: ICD-10-CM

## 2018-11-21 DIAGNOSIS — E11.3293 MILD NONPROLIFERATIVE DIABETIC RETINOPATHY OF BOTH EYES WITHOUT MACULAR EDEMA ASSOCIATED WITH TYPE 2 DIABETES MELLITUS (HCC): ICD-10-CM

## 2018-11-21 DIAGNOSIS — E53.8 COBALAMIN DEFICIENCY: ICD-10-CM

## 2018-11-21 DIAGNOSIS — E66.01 MORBID OBESITY DUE TO EXCESS CALORIES (HCC): ICD-10-CM

## 2018-11-21 DIAGNOSIS — M54.30 SCIATICA, UNSPECIFIED LATERALITY: ICD-10-CM

## 2018-11-21 DIAGNOSIS — R31.9 HEMATURIA, UNSPECIFIED TYPE: ICD-10-CM

## 2018-11-21 DIAGNOSIS — M10.9 GOUT, UNSPECIFIED CAUSE, UNSPECIFIED CHRONICITY, UNSPECIFIED SITE: ICD-10-CM

## 2018-11-21 DIAGNOSIS — E11.9 TYPE 2 DIABETES MELLITUS WITHOUT COMPLICATION, WITH LONG-TERM CURRENT USE OF INSULIN (HCC): ICD-10-CM

## 2018-11-21 DIAGNOSIS — G47.30 SLEEP APNEA, UNSPECIFIED TYPE: ICD-10-CM

## 2018-11-21 DIAGNOSIS — R05.9 COUGH: ICD-10-CM

## 2018-11-21 DIAGNOSIS — M15.9 PRIMARY OSTEOARTHRITIS INVOLVING MULTIPLE JOINTS: ICD-10-CM

## 2018-11-21 DIAGNOSIS — E78.2 MIXED HYPERLIPIDEMIA: ICD-10-CM

## 2018-11-21 DIAGNOSIS — M27.9 DISEASE OF JAW: ICD-10-CM

## 2018-11-21 DIAGNOSIS — M79.602 LEFT ARM PAIN: ICD-10-CM

## 2018-11-21 DIAGNOSIS — L84 CALLUS: ICD-10-CM

## 2018-11-21 DIAGNOSIS — F32.9 REACTIVE DEPRESSION: ICD-10-CM

## 2018-11-21 DIAGNOSIS — R60.9 EDEMA, UNSPECIFIED TYPE: ICD-10-CM

## 2018-11-21 DIAGNOSIS — D63.1 ANEMIA OF CHRONIC RENAL FAILURE, STAGE 5 (HCC): ICD-10-CM

## 2018-11-21 DIAGNOSIS — N28.9 RENAL INSUFFICIENCY: Primary | ICD-10-CM

## 2018-11-21 LAB
CREAT BLD-MCNC: 4.75 MG/DL (ref 0.6–1.3)
FERRITIN SERPL-MCNC: 116 NG/ML (ref 22–322)
GFR SERPL CREATININE-BSD FRML MDRD: 12 ML/MIN/1.73
HCT VFR BLD AUTO: 30.8 % (ref 38.9–50.9)
HGB BLD-MCNC: 10 G/DL (ref 13.1–17.5)
IRON 24H UR-MRATE: 85 MCG/DL (ref 50–175)
IRON SATN MFR SERPL: 45 % (ref 20–50)
POTASSIUM BLD-SCNC: 4.2 MMOL/L (ref 3.5–5.5)
TIBC SERPL-MCNC: 188 MCG/DL (ref 250–450)

## 2018-11-21 PROCEDURE — 63510000001 EPOETIN ALFA PER 1000 UNITS: Performed by: INTERNAL MEDICINE

## 2018-11-21 PROCEDURE — 83550 IRON BINDING TEST: CPT | Performed by: INTERNAL MEDICINE

## 2018-11-21 PROCEDURE — 85018 HEMOGLOBIN: CPT | Performed by: INTERNAL MEDICINE

## 2018-11-21 PROCEDURE — 85014 HEMATOCRIT: CPT | Performed by: INTERNAL MEDICINE

## 2018-11-21 PROCEDURE — 36415 COLL VENOUS BLD VENIPUNCTURE: CPT

## 2018-11-21 PROCEDURE — 83540 ASSAY OF IRON: CPT | Performed by: INTERNAL MEDICINE

## 2018-11-21 PROCEDURE — 82728 ASSAY OF FERRITIN: CPT | Performed by: INTERNAL MEDICINE

## 2018-11-21 PROCEDURE — 82565 ASSAY OF CREATININE: CPT | Performed by: INTERNAL MEDICINE

## 2018-11-21 PROCEDURE — 96372 THER/PROPH/DIAG INJ SC/IM: CPT

## 2018-11-21 PROCEDURE — 84132 ASSAY OF SERUM POTASSIUM: CPT | Performed by: INTERNAL MEDICINE

## 2018-11-21 RX ADMIN — EPOETIN ALFA 20000 UNITS: 20000 SOLUTION INTRAVENOUS; SUBCUTANEOUS at 10:50

## 2018-11-24 DIAGNOSIS — F32.9 REACTIVE DEPRESSION: ICD-10-CM

## 2018-11-26 RX ORDER — VENLAFAXINE 75 MG/1
TABLET ORAL
Qty: 45 TABLET | Refills: 0 | Status: SHIPPED | OUTPATIENT
Start: 2018-11-26 | End: 2019-01-01 | Stop reason: SDUPTHER

## 2018-11-28 ENCOUNTER — INFUSION (OUTPATIENT)
Dept: ONCOLOGY | Facility: HOSPITAL | Age: 63
End: 2018-11-28

## 2018-11-28 ENCOUNTER — APPOINTMENT (OUTPATIENT)
Dept: PREADMISSION TESTING | Facility: HOSPITAL | Age: 63
End: 2018-11-28

## 2018-11-28 ENCOUNTER — CLINICAL SUPPORT (OUTPATIENT)
Dept: INTERNAL MEDICINE | Facility: CLINIC | Age: 63
End: 2018-11-28

## 2018-11-28 VITALS — WEIGHT: 227.07 LBS | HEIGHT: 72 IN | BODY MASS INDEX: 30.76 KG/M2

## 2018-11-28 VITALS
SYSTOLIC BLOOD PRESSURE: 103 MMHG | HEART RATE: 50 BPM | RESPIRATION RATE: 16 BRPM | TEMPERATURE: 97.2 F | DIASTOLIC BLOOD PRESSURE: 56 MMHG

## 2018-11-28 DIAGNOSIS — R31.9 HEMATURIA, UNSPECIFIED TYPE: ICD-10-CM

## 2018-11-28 DIAGNOSIS — R05.9 COUGH: ICD-10-CM

## 2018-11-28 DIAGNOSIS — M10.9 GOUT, UNSPECIFIED CAUSE, UNSPECIFIED CHRONICITY, UNSPECIFIED SITE: ICD-10-CM

## 2018-11-28 DIAGNOSIS — E78.2 MIXED HYPERLIPIDEMIA: ICD-10-CM

## 2018-11-28 DIAGNOSIS — E66.01 MORBID OBESITY DUE TO EXCESS CALORIES (HCC): ICD-10-CM

## 2018-11-28 DIAGNOSIS — M15.9 PRIMARY OSTEOARTHRITIS INVOLVING MULTIPLE JOINTS: ICD-10-CM

## 2018-11-28 DIAGNOSIS — M54.30 SCIATICA, UNSPECIFIED LATERALITY: ICD-10-CM

## 2018-11-28 DIAGNOSIS — B35.1 ONYCHOMYCOSIS: ICD-10-CM

## 2018-11-28 DIAGNOSIS — E11.3293 MILD NONPROLIFERATIVE DIABETIC RETINOPATHY OF BOTH EYES WITHOUT MACULAR EDEMA ASSOCIATED WITH TYPE 2 DIABETES MELLITUS (HCC): ICD-10-CM

## 2018-11-28 DIAGNOSIS — E53.8 COBALAMIN DEFICIENCY: ICD-10-CM

## 2018-11-28 DIAGNOSIS — R60.9 EDEMA, UNSPECIFIED TYPE: ICD-10-CM

## 2018-11-28 DIAGNOSIS — M25.519 ARTHRALGIA OF SHOULDER, UNSPECIFIED LATERALITY: ICD-10-CM

## 2018-11-28 DIAGNOSIS — M79.602 LEFT ARM PAIN: ICD-10-CM

## 2018-11-28 DIAGNOSIS — F32.9 REACTIVE DEPRESSION: ICD-10-CM

## 2018-11-28 DIAGNOSIS — E11.9 TYPE 2 DIABETES MELLITUS WITHOUT COMPLICATION, WITH LONG-TERM CURRENT USE OF INSULIN (HCC): ICD-10-CM

## 2018-11-28 DIAGNOSIS — L84 CALLUS: ICD-10-CM

## 2018-11-28 DIAGNOSIS — N28.9 RENAL INSUFFICIENCY: Primary | ICD-10-CM

## 2018-11-28 DIAGNOSIS — E87.5 HYPERKALEMIA: ICD-10-CM

## 2018-11-28 DIAGNOSIS — E03.9 ACQUIRED HYPOTHYROIDISM: ICD-10-CM

## 2018-11-28 DIAGNOSIS — G47.30 SLEEP APNEA, UNSPECIFIED TYPE: ICD-10-CM

## 2018-11-28 DIAGNOSIS — M27.9 DISEASE OF JAW: ICD-10-CM

## 2018-11-28 DIAGNOSIS — E88.9 METABOLIC DISORDER: ICD-10-CM

## 2018-11-28 DIAGNOSIS — N18.5 ANEMIA OF CHRONIC RENAL FAILURE, STAGE 5 (HCC): ICD-10-CM

## 2018-11-28 DIAGNOSIS — D63.1 ANEMIA OF CHRONIC RENAL FAILURE, STAGE 5 (HCC): ICD-10-CM

## 2018-11-28 DIAGNOSIS — Z79.4 TYPE 2 DIABETES MELLITUS WITHOUT COMPLICATION, WITH LONG-TERM CURRENT USE OF INSULIN (HCC): ICD-10-CM

## 2018-11-28 DIAGNOSIS — N28.9 RENAL INSUFFICIENCY: ICD-10-CM

## 2018-11-28 LAB
ANION GAP SERPL CALCULATED.3IONS-SCNC: 10 MMOL/L (ref 3–11)
BUN BLD-MCNC: 78 MG/DL (ref 9–23)
BUN/CREAT SERPL: 14.4 (ref 7–25)
CALCIUM SPEC-SCNC: 8.3 MG/DL (ref 8.7–10.4)
CHLORIDE SERPL-SCNC: 118 MMOL/L (ref 99–109)
CO2 SERPL-SCNC: 13 MMOL/L (ref 20–31)
CREAT BLD-MCNC: 5.43 MG/DL (ref 0.6–1.3)
DEPRECATED RDW RBC AUTO: 51 FL (ref 37–54)
ERYTHROCYTE [DISTWIDTH] IN BLOOD BY AUTOMATED COUNT: 15.2 % (ref 11.3–14.5)
GFR SERPL CREATININE-BSD FRML MDRD: 11 ML/MIN/1.73
GLUCOSE BLD-MCNC: 141 MG/DL (ref 70–100)
HCT VFR BLD AUTO: 33.7 % (ref 38.9–50.9)
HGB BLD-MCNC: 10.9 G/DL (ref 13.1–17.5)
IRON 24H UR-MRATE: 46 MCG/DL (ref 50–175)
IRON 24H UR-MRATE: 46 MCG/DL (ref 50–175)
IRON SATN MFR SERPL: 23 % (ref 20–50)
IRON SATN SFR SERPL: 22.77 % (ref 20–50)
MCH RBC QN AUTO: 30.6 PG (ref 27–31)
MCHC RBC AUTO-ENTMCNC: 32.3 G/DL (ref 32–36)
MCV RBC AUTO: 94.7 FL (ref 80–99)
PLATELET # BLD AUTO: 214 10*3/MM3 (ref 150–450)
PMV BLD AUTO: 10.4 FL (ref 6–12)
POTASSIUM BLD-SCNC: 4.1 MMOL/L (ref 3.5–5.5)
RBC # BLD AUTO: 3.56 10*6/MM3 (ref 4.2–5.76)
SODIUM BLD-SCNC: 141 MMOL/L (ref 132–146)
TIBC SERPL-MCNC: 202 MCG/DL (ref 250–450)
TIBC SERPL-MCNC: 202 MCG/DL (ref 250–450)
WBC NRBC COR # BLD: 7.88 10*3/MM3 (ref 3.5–10.8)

## 2018-11-28 PROCEDURE — 85027 COMPLETE CBC AUTOMATED: CPT | Performed by: PLASTIC SURGERY

## 2018-11-28 PROCEDURE — 83550 IRON BINDING TEST: CPT | Performed by: PLASTIC SURGERY

## 2018-11-28 PROCEDURE — 80048 BASIC METABOLIC PNL TOTAL CA: CPT | Performed by: PLASTIC SURGERY

## 2018-11-28 PROCEDURE — 96372 THER/PROPH/DIAG INJ SC/IM: CPT | Performed by: INTERNAL MEDICINE

## 2018-11-28 PROCEDURE — 36415 COLL VENOUS BLD VENIPUNCTURE: CPT

## 2018-11-28 PROCEDURE — 96372 THER/PROPH/DIAG INJ SC/IM: CPT

## 2018-11-28 PROCEDURE — 83540 ASSAY OF IRON: CPT | Performed by: PLASTIC SURGERY

## 2018-11-28 PROCEDURE — 63510000001 EPOETIN ALFA PER 1000 UNITS: Performed by: INTERNAL MEDICINE

## 2018-11-28 PROCEDURE — 82728 ASSAY OF FERRITIN: CPT | Performed by: PLASTIC SURGERY

## 2018-11-28 RX ORDER — GABAPENTIN 400 MG/1
800 CAPSULE ORAL DAILY
COMMUNITY
End: 2019-01-01 | Stop reason: HOSPADM

## 2018-11-28 RX ORDER — CEFAZOLIN SODIUM 2 G/100ML
2 INJECTION, SOLUTION INTRAVENOUS ONCE
Status: CANCELLED | OUTPATIENT
Start: 2018-12-05

## 2018-11-28 RX ADMIN — EPOETIN ALFA 20000 UNITS: 20000 SOLUTION INTRAVENOUS; SUBCUTANEOUS at 14:02

## 2018-11-28 RX ADMIN — CYANOCOBALAMIN 1000 MCG: 1000 INJECTION, SOLUTION INTRAMUSCULAR; SUBCUTANEOUS at 16:30

## 2018-11-29 LAB — FERRITIN SERPL-MCNC: 92 NG/ML (ref 22–322)

## 2018-11-30 ENCOUNTER — CONSULT (OUTPATIENT)
Dept: CARDIOLOGY | Facility: CLINIC | Age: 63
End: 2018-11-30

## 2018-11-30 VITALS
WEIGHT: 226.4 LBS | HEART RATE: 56 BPM | DIASTOLIC BLOOD PRESSURE: 68 MMHG | BODY MASS INDEX: 30.66 KG/M2 | HEIGHT: 72 IN | SYSTOLIC BLOOD PRESSURE: 138 MMHG

## 2018-11-30 DIAGNOSIS — Z01.810 PREOP CARDIOVASCULAR EXAM: Primary | ICD-10-CM

## 2018-11-30 PROCEDURE — 99204 OFFICE O/P NEW MOD 45 MIN: CPT | Performed by: PHYSICIAN ASSISTANT

## 2018-11-30 PROCEDURE — 93000 ELECTROCARDIOGRAM COMPLETE: CPT | Performed by: PHYSICIAN ASSISTANT

## 2018-11-30 NOTE — PROGRESS NOTES
[facility] at [facility]  INITIAL OFFICE CONSULT      Kevin Aguero  1955  PCP: Gilson Gates MD  Nephologist:  Dr. Green    SUBJECTIVE:   Kevin Aguero is a 63 y.o. male seen for a consultation visit regarding the following: Abnormal EKG, HTN, Preop clerance    Chief Complaint:   Chief Complaint   Patient presents with   • Hypertension     Consult   • surgery clearance      Consultation is requested by Dr. Chan for evaluation of Hypertension (Consult) and surgery clearance    Problem LIst:  1. Abnormal EKG   A)Normal C 1990's, [facility]   B)Stress MPS 11/8/18, No chest pain , Negative for ischemia, Normal EF   C)Echocardiogram 11/18 Normal EF 55%, LAE, No VHD  2. HTN  3. HLD  4. Hx of Morbid Obesity, Gastric bypass [facility], 150Lbs weight loss  5. CKD, ESRD:  Followed by Dr. Green, patient seeking Kidney transplant  6. DM Type II-controlled after gastric bypass  7. Hypothyroidism: On synthroid  8. JEAN PAUL.  9. Peripheral  Neuropathy    History:  The patient is a pleasant 63-year-old gentleman seen in consultation today for preop clearance at the request of Dr. Chan and Dr. Randall regarding history of abnormal EKG.  The patient reports that he has had a pre-cardiac workup as he is on a transplant list for kidney.  He underwent a stress test November 2018 revealing no evidence of ischemia and normal LV function and he had an echocardiogram at that same time revealing normal LV functionand scant valvular heart disease.  Reports is not having any symptoms of chest pain or chest tightness suggesting angina pectoris.  He denies worsening shortness of breath, dizziness, palpitations, near-syncope or syncope.  He states that he walks on a regular basis up to a mile.  He is undergoing evaluation for possible plastic surgery with Dr. Chan as he had gastric bypass over a year ago.  He lost close to 150 pounds and because this has been a large amount of excess skin.  He would like  to have this removed and was undergoing workup when an EKG was obtained.  This was considered abnormal revealing incomplete left bundle branch block and therefore is referred to our office further evaluation.  However, today he reports that the procedure has been put on hold per his nephrologist Dr. Green.  He states that his creatinine increased to 5.5  and Dr. Green would like to monitor this until his creatinine improves prior to proceeding with surgery.      Cardiac PMH: (Old records have been reviewed and summarized below)        Past Medical History, Past Surgical History, Family history, Social History, and Medications were all reviewed with the patient today and updated as necessary.     Current Outpatient Medications   Medication Sig Dispense Refill   • allopurinol (ZYLOPRIM) 100 MG tablet Take 1 tablet by mouth Daily. For: Gout 90 tablet 1   • amLODIPine (NORVASC) 5 MG tablet Take 1 tablet by mouth Daily. (Patient taking differently: Take 10 mg by mouth Daily.) 90 tablet 1   • atorvastatin (LIPITOR) 20 MG tablet Take 1 tablet by mouth Daily. 90 tablet 1   • buPROPion (WELLBUTRIN) 100 MG tablet TAKE 1 TABLET BY MOUTH 3  TIMES A  tablet 2   • Cholecalciferol (VITAMIN D3) 5000 units capsule capsule Take 5,000 Units by mouth Daily.     • doxazosin (CARDURA) 2 MG tablet Take 1 tablet by mouth 2 (Two) Times a Day. 180 tablet 1   • furosemide (LASIX) 20 MG tablet Take 20 mg by mouth Daily.     • gabapentin (NEURONTIN) 400 MG capsule Take 400 mg by mouth Daily.     • gabapentin (NEURONTIN) 800 MG tablet Take 1 tablet by mouth 3 (Three) Times a Day. For: Gout (Patient taking differently: Take 400 mg by mouth Daily. For: Gout) 270 tablet 1   • glucose blood (ONE TOUCH ULTRA TEST) test strip 1 each by Other route Daily. TEST; For: Diabetes mellitus 50 each 5   • Insulin Pen Needle (B-D ULTRAFINE III SHORT PEN) 31G X 8 MM misc 4 (four) times a day. USE qid; For: Diabetes mellitus     • levothyroxine  (SYNTHROID, LEVOTHROID) 125 MCG tablet Take 1 tablet by mouth Daily. 90 tablet 1   • losartan (COZAAR) 100 MG tablet Take 1 tablet by mouth Daily. 90 tablet 2   • nebivolol (BYSTOLIC) 10 MG tablet Take 1 tablet by mouth Daily. 90 tablet 1   • ONETOUCH DELICA LANCETS 33G misc Use for blood sugar testing twice daily; For: Benign essential hypertension     • venlafaxine (EFFEXOR) 75 MG tablet TAKE ONE-HALF TABLET BY  MOUTH TWICE A DAY 45 tablet 0     Current Facility-Administered Medications   Medication Dose Route Frequency Provider Last Rate Last Dose   • cyanocobalamin injection 1,000 mcg  1,000 mcg Intramuscular Q28 Days Gilson Gates MD   1,000 mcg at 11/28/18 1630     Allergies   Allergen Reactions   • Sulfa Antibiotics Other (See Comments)     Patient does not remember the reaction symptoms         Past Medical History:   Diagnosis Date   • Allergy    • Anemia    • Arthritis    • Breast pain, left    • Callus    • Depression    • Depression    • Diabetes education, encounter for 10/2014    Description: Camron 10/14 BDR  Last Impression: [id number]  refer for help with compulsive eating  Anuradha Charles (Endocrinology)   • Hyperlipidemia    • Hypertension    • Hypothyroidism    • Left arm pain    • Low back pain    • Obesity    • Pain in joint of left shoulder    • Proteinuria     sees nephrology- 12 gm of proteincould not do biopsy due to size- nephrology following.   • Renal insufficiency    • Sleep apnea    • Sleep apnea with use of continuous positive airway pressure (CPAP)    • Stage 5 chronic kidney disease (CMS/HCC)      Past Surgical History:   Procedure Laterality Date   • COLONOSCOPY      2018   • ROTATOR CUFF REPAIR     • CALE-EN-Y  06/26/2017   • TOOTH EXTRACTION       Family History   Problem Relation Age of Onset   • No Known Problems Mother    • Diabetes Father    • Hypertension Father    • Cancer Father      Social History     Tobacco Use   • Smoking status: Never Smoker   • Smokeless  "tobacco: Never Used   Substance Use Topics   • Alcohol use: Yes     Alcohol/week: 0.6 oz     Types: 1 Cans of beer per week     Comment: 2 times per month       ROS:  Review of Symptoms:  General: no recent weight loss/gain, weakness or fatigue  Skin: no rashes, lumps, or other skin changes  HEENT: no dizziness, lightheadedness, or vision changes, he has glasses  Respiratory: no cough or hemoptysis  Cardiovascular: no palpitations, and tachycardia  Gastrointestinal: no black/tarry stools or diarrhea  Urinary: no change in frequency or urgency  Peripheral Vascular: no claudication or leg cramps  Musculoskeletal: no muscle or joint pain/stiffness. Hx of Gout  Psychiatric: no depression or excessive stress  Neurological: no sensory or motor loss, no syncope. +neuropathy  Hematologic: + anemia  Endocrine: On synthroid         PHYSICAL EXAM:   Ht 182.9 cm (72\")   Wt 103 kg (226 lb 6.4 oz)   BMI 30.71 kg/m²      Wt Readings from Last 5 Encounters:   11/30/18 103 kg (226 lb 6.4 oz)   11/28/18 103 kg (227 lb 1.2 oz)   11/21/18 103 kg (227 lb)   10/24/18 102 kg (224 lb)   10/17/18 101 kg (223 lb)     BP Readings from Last 5 Encounters:   11/28/18 103/56   11/21/18 118/56   10/24/18 133/64   10/17/18 174/71   10/10/18 144/73       General-Well Nourished, Well developed  Eyes - PERRLA, wears glasses  Neck- supple, No mass  CV- regular rate and rhythm, no MRG  Lung- clear bilaterally  Abd- soft, +BS  Musc/skel - Norm strength and range of motion. Trace edema  Skin- warm and dry  Neuro - Alert & Oriented x 3, appropriate mood.    Medical problems and test results were reviewed with the patient today.     Results for orders placed or performed in visit on 11/28/18   Ferritin   Result Value Ref Range    Ferritin 92.00 22.00 - 322.00 ng/mL       Lab Results   Component Value Date    CHOL 182 08/16/2018    HDL 61 (H) 08/16/2018     08/16/2018    VLDL 40 08/21/2017     EKG:  (EKG/Tracing has been independently visualized by " me and summarized below)      ECG 12 Lead  Date/Time: 11/30/2018 2:34 PM  Performed by: Reji Nelson PA  Authorized by: Reji Nelson PA   Comparison: compared with previous ECG from 10/9/2018  Rhythm: sinus rhythm  Rate: normal  Conduction: conduction normal  ST Segments: ST segments normal  QRS axis: normal  Clinical impression: abnormal ECG  Comments: Nonspecific ST-T changes.  Poor quality EKG secondary to artifact.           ASSESSMENT   1. Abnormal EKG:  No symptoms suggesting angina or CHF. Recent Stress and Echo with  11/18 revealing no ischemia, normal EF with No VHD.   2. HTN: followed by Dr. Green, patient reports ok control.   3. HLD: Continue statin, follow up FLP with PCP  4. ESRD:  Followed by Dr. Green     PLAN  · We discussed reslults of his EKG and Stress test and Echo he had at  11/18. Dr. Jane reviewed these studies as well.  The patient has no symptoms suggesting angina or CHF.  He will be considered standard CV risk for surgery.  · Continue to focus on risk factor modification.         Cardiology  11/30/18  1:43 PM  Will Leslie BOGGS

## 2018-12-03 NOTE — PAT
Patient was seen by Romie Valenzuela and Dr. SMALLS ON 11/30/18. Patient did received cardiac clearance, however, patient's creatinine was elevated and surgery has been cancelled until further notice. Esther at Dr. Chan's office is aware of all of the above.

## 2018-12-05 ENCOUNTER — HOSPITAL ENCOUNTER (OUTPATIENT)
Facility: HOSPITAL | Age: 63
Setting detail: SURGERY ADMIT
Discharge: HOME | End: 2018-12-05
Attending: PLASTIC SURGERY | Admitting: PLASTIC SURGERY

## 2018-12-06 ENCOUNTER — INFUSION (OUTPATIENT)
Dept: ONCOLOGY | Facility: HOSPITAL | Age: 63
End: 2018-12-06

## 2018-12-06 VITALS
RESPIRATION RATE: 16 BRPM | HEART RATE: 62 BPM | SYSTOLIC BLOOD PRESSURE: 122 MMHG | WEIGHT: 223 LBS | TEMPERATURE: 97.4 F | BODY MASS INDEX: 30.2 KG/M2 | DIASTOLIC BLOOD PRESSURE: 62 MMHG | HEIGHT: 72 IN

## 2018-12-06 DIAGNOSIS — B35.1 ONYCHOMYCOSIS: ICD-10-CM

## 2018-12-06 DIAGNOSIS — M79.602 LEFT ARM PAIN: ICD-10-CM

## 2018-12-06 DIAGNOSIS — M27.9 DISEASE OF JAW: ICD-10-CM

## 2018-12-06 DIAGNOSIS — R05.9 COUGH: ICD-10-CM

## 2018-12-06 DIAGNOSIS — E03.9 ACQUIRED HYPOTHYROIDISM: ICD-10-CM

## 2018-12-06 DIAGNOSIS — M54.30 SCIATICA, UNSPECIFIED LATERALITY: ICD-10-CM

## 2018-12-06 DIAGNOSIS — F32.9 REACTIVE DEPRESSION: ICD-10-CM

## 2018-12-06 DIAGNOSIS — N28.9 RENAL INSUFFICIENCY: Primary | ICD-10-CM

## 2018-12-06 DIAGNOSIS — E78.2 MIXED HYPERLIPIDEMIA: ICD-10-CM

## 2018-12-06 DIAGNOSIS — M25.519 ARTHRALGIA OF SHOULDER, UNSPECIFIED LATERALITY: ICD-10-CM

## 2018-12-06 DIAGNOSIS — E11.3293 MILD NONPROLIFERATIVE DIABETIC RETINOPATHY OF BOTH EYES WITHOUT MACULAR EDEMA ASSOCIATED WITH TYPE 2 DIABETES MELLITUS (HCC): ICD-10-CM

## 2018-12-06 DIAGNOSIS — E88.9 METABOLIC DISORDER: ICD-10-CM

## 2018-12-06 DIAGNOSIS — Z79.4 TYPE 2 DIABETES MELLITUS WITHOUT COMPLICATION, WITH LONG-TERM CURRENT USE OF INSULIN (HCC): ICD-10-CM

## 2018-12-06 DIAGNOSIS — N18.5 ANEMIA OF CHRONIC RENAL FAILURE, STAGE 5 (HCC): ICD-10-CM

## 2018-12-06 DIAGNOSIS — L84 CALLUS: ICD-10-CM

## 2018-12-06 DIAGNOSIS — E53.8 COBALAMIN DEFICIENCY: ICD-10-CM

## 2018-12-06 DIAGNOSIS — M15.9 PRIMARY OSTEOARTHRITIS INVOLVING MULTIPLE JOINTS: ICD-10-CM

## 2018-12-06 DIAGNOSIS — R31.9 HEMATURIA, UNSPECIFIED TYPE: ICD-10-CM

## 2018-12-06 DIAGNOSIS — E87.5 HYPERKALEMIA: ICD-10-CM

## 2018-12-06 DIAGNOSIS — E11.9 TYPE 2 DIABETES MELLITUS WITHOUT COMPLICATION, WITH LONG-TERM CURRENT USE OF INSULIN (HCC): ICD-10-CM

## 2018-12-06 DIAGNOSIS — R60.9 EDEMA, UNSPECIFIED TYPE: ICD-10-CM

## 2018-12-06 DIAGNOSIS — E66.01 MORBID OBESITY DUE TO EXCESS CALORIES (HCC): ICD-10-CM

## 2018-12-06 DIAGNOSIS — D63.1 ANEMIA OF CHRONIC RENAL FAILURE, STAGE 5 (HCC): ICD-10-CM

## 2018-12-06 DIAGNOSIS — M10.9 GOUT, UNSPECIFIED CAUSE, UNSPECIFIED CHRONICITY, UNSPECIFIED SITE: ICD-10-CM

## 2018-12-06 DIAGNOSIS — G47.30 SLEEP APNEA, UNSPECIFIED TYPE: ICD-10-CM

## 2018-12-06 LAB
CREAT BLD-MCNC: 5.03 MG/DL (ref 0.6–1.3)
GFR SERPL CREATININE-BSD FRML MDRD: 12 ML/MIN/1.73
HCT VFR BLD AUTO: 31.3 % (ref 38.9–50.9)
HGB BLD-MCNC: 10.5 G/DL (ref 13.1–17.5)
POTASSIUM BLD-SCNC: 4.2 MMOL/L (ref 3.5–5.5)

## 2018-12-06 PROCEDURE — 85014 HEMATOCRIT: CPT | Performed by: INTERNAL MEDICINE

## 2018-12-06 PROCEDURE — 63510000001 EPOETIN ALFA PER 1000 UNITS: Performed by: INTERNAL MEDICINE

## 2018-12-06 PROCEDURE — 82565 ASSAY OF CREATININE: CPT | Performed by: INTERNAL MEDICINE

## 2018-12-06 PROCEDURE — 96372 THER/PROPH/DIAG INJ SC/IM: CPT

## 2018-12-06 PROCEDURE — 84132 ASSAY OF SERUM POTASSIUM: CPT | Performed by: INTERNAL MEDICINE

## 2018-12-06 PROCEDURE — 85018 HEMOGLOBIN: CPT | Performed by: INTERNAL MEDICINE

## 2018-12-06 PROCEDURE — 36415 COLL VENOUS BLD VENIPUNCTURE: CPT

## 2018-12-06 RX ADMIN — EPOETIN ALFA 20000 UNITS: 20000 SOLUTION INTRAVENOUS; SUBCUTANEOUS at 16:26

## 2018-12-13 ENCOUNTER — HOSPITAL ENCOUNTER (OUTPATIENT)
Dept: ONCOLOGY | Facility: HOSPITAL | Age: 63
Discharge: HOME | End: 2018-12-13
Admitting: INTERNAL MEDICINE

## 2018-12-13 VITALS
HEIGHT: 72 IN | SYSTOLIC BLOOD PRESSURE: 130 MMHG | RESPIRATION RATE: 16 BRPM | HEART RATE: 58 BPM | BODY MASS INDEX: 29.8 KG/M2 | DIASTOLIC BLOOD PRESSURE: 68 MMHG | WEIGHT: 220 LBS | TEMPERATURE: 97.3 F

## 2018-12-13 DIAGNOSIS — E03.9 ACQUIRED HYPOTHYROIDISM: ICD-10-CM

## 2018-12-13 DIAGNOSIS — M79.602 LEFT ARM PAIN: ICD-10-CM

## 2018-12-13 DIAGNOSIS — M10.9 GOUT, UNSPECIFIED CAUSE, UNSPECIFIED CHRONICITY, UNSPECIFIED SITE: ICD-10-CM

## 2018-12-13 DIAGNOSIS — R31.9 HEMATURIA, UNSPECIFIED TYPE: ICD-10-CM

## 2018-12-13 DIAGNOSIS — E87.5 HYPERKALEMIA: ICD-10-CM

## 2018-12-13 DIAGNOSIS — Z79.4 TYPE 2 DIABETES MELLITUS WITHOUT COMPLICATION, WITH LONG-TERM CURRENT USE OF INSULIN (HCC): ICD-10-CM

## 2018-12-13 DIAGNOSIS — R60.9 EDEMA, UNSPECIFIED TYPE: ICD-10-CM

## 2018-12-13 DIAGNOSIS — E11.9 TYPE 2 DIABETES MELLITUS WITHOUT COMPLICATION, WITH LONG-TERM CURRENT USE OF INSULIN (HCC): ICD-10-CM

## 2018-12-13 DIAGNOSIS — B35.1 ONYCHOMYCOSIS: ICD-10-CM

## 2018-12-13 DIAGNOSIS — E66.01 MORBID OBESITY DUE TO EXCESS CALORIES (HCC): ICD-10-CM

## 2018-12-13 DIAGNOSIS — L84 CALLUS: ICD-10-CM

## 2018-12-13 DIAGNOSIS — E11.3293 MILD NONPROLIFERATIVE DIABETIC RETINOPATHY OF BOTH EYES WITHOUT MACULAR EDEMA ASSOCIATED WITH TYPE 2 DIABETES MELLITUS (HCC): ICD-10-CM

## 2018-12-13 DIAGNOSIS — M15.9 PRIMARY OSTEOARTHRITIS INVOLVING MULTIPLE JOINTS: ICD-10-CM

## 2018-12-13 DIAGNOSIS — E53.8 COBALAMIN DEFICIENCY: ICD-10-CM

## 2018-12-13 DIAGNOSIS — F32.9 REACTIVE DEPRESSION: ICD-10-CM

## 2018-12-13 DIAGNOSIS — G47.30 SLEEP APNEA, UNSPECIFIED TYPE: ICD-10-CM

## 2018-12-13 DIAGNOSIS — R05.9 COUGH: ICD-10-CM

## 2018-12-13 DIAGNOSIS — N28.9 RENAL INSUFFICIENCY: ICD-10-CM

## 2018-12-13 DIAGNOSIS — M27.9 DISEASE OF JAW: ICD-10-CM

## 2018-12-13 DIAGNOSIS — E78.2 MIXED HYPERLIPIDEMIA: ICD-10-CM

## 2018-12-13 DIAGNOSIS — E88.9 METABOLIC DISORDER: ICD-10-CM

## 2018-12-13 DIAGNOSIS — M25.519 ARTHRALGIA OF SHOULDER, UNSPECIFIED LATERALITY: ICD-10-CM

## 2018-12-13 DIAGNOSIS — M54.30 SCIATICA, UNSPECIFIED LATERALITY: ICD-10-CM

## 2018-12-13 LAB
CREAT BLD-MCNC: 4.67 MG/DL (ref 0.6–1.3)
GFR SERPL CREATININE-BSD FRML MDRD: 13 ML/MIN/1.73
HCT VFR BLD AUTO: 34.8 % (ref 38.9–50.9)
HGB BLD-MCNC: 11.2 G/DL (ref 13.1–17.5)
POTASSIUM BLD-SCNC: 3.5 MMOL/L (ref 3.5–5.5)

## 2018-12-13 PROCEDURE — 36415 COLL VENOUS BLD VENIPUNCTURE: CPT

## 2018-12-13 PROCEDURE — 84132 ASSAY OF SERUM POTASSIUM: CPT | Performed by: INTERNAL MEDICINE

## 2018-12-13 PROCEDURE — 82565 ASSAY OF CREATININE: CPT | Performed by: INTERNAL MEDICINE

## 2018-12-13 PROCEDURE — 85018 HEMOGLOBIN: CPT | Performed by: INTERNAL MEDICINE

## 2018-12-13 PROCEDURE — 85014 HEMATOCRIT: CPT | Performed by: INTERNAL MEDICINE

## 2019-01-01 ENCOUNTER — TELEPHONE (OUTPATIENT)
Dept: INTERNAL MEDICINE | Facility: CLINIC | Age: 64
End: 2019-01-01

## 2019-01-01 ENCOUNTER — CLINICAL SUPPORT (OUTPATIENT)
Dept: INTERNAL MEDICINE | Facility: CLINIC | Age: 64
End: 2019-01-01

## 2019-01-01 ENCOUNTER — OFFICE VISIT (OUTPATIENT)
Dept: INTERNAL MEDICINE | Facility: CLINIC | Age: 64
End: 2019-01-01

## 2019-01-01 ENCOUNTER — APPOINTMENT (OUTPATIENT)
Dept: NEPHROLOGY | Facility: HOSPITAL | Age: 64
End: 2019-01-01

## 2019-01-01 ENCOUNTER — HOSPITAL ENCOUNTER (INPATIENT)
Facility: HOSPITAL | Age: 64
LOS: 4 days | Discharge: HOME | End: 2019-03-02
Attending: EMERGENCY MEDICINE | Admitting: INTERNAL MEDICINE

## 2019-01-01 ENCOUNTER — HOSPITAL ENCOUNTER (OUTPATIENT)
Dept: ONCOLOGY | Facility: HOSPITAL | Age: 64
Discharge: HOME | End: 2019-01-10
Admitting: INTERNAL MEDICINE

## 2019-01-01 ENCOUNTER — APPOINTMENT (OUTPATIENT)
Dept: GENERAL RADIOLOGY | Facility: HOSPITAL | Age: 64
End: 2019-01-01

## 2019-01-01 ENCOUNTER — READMISSION MANAGEMENT (OUTPATIENT)
Dept: CALL CENTER | Facility: HOSPITAL | Age: 64
End: 2019-01-01

## 2019-01-01 ENCOUNTER — HOSPITAL ENCOUNTER (OUTPATIENT)
Dept: ONCOLOGY | Facility: HOSPITAL | Age: 64
Discharge: HOME | End: 2019-01-31
Admitting: INTERNAL MEDICINE

## 2019-01-01 ENCOUNTER — OFFICE VISIT (OUTPATIENT)
Dept: ORTHOPEDIC SURGERY | Facility: CLINIC | Age: 64
End: 2019-01-01

## 2019-01-01 ENCOUNTER — OFFICE VISIT (OUTPATIENT)
Dept: ENDOCRINOLOGY | Facility: CLINIC | Age: 64
End: 2019-01-01

## 2019-01-01 ENCOUNTER — HOSPITAL ENCOUNTER (OUTPATIENT)
Dept: ONCOLOGY | Facility: HOSPITAL | Age: 64
Discharge: HOME | End: 2019-02-14
Admitting: INTERNAL MEDICINE

## 2019-01-01 ENCOUNTER — ANESTHESIA EVENT (OUTPATIENT)
Dept: PERIOP | Facility: HOSPITAL | Age: 64
End: 2019-01-01

## 2019-01-01 ENCOUNTER — HOSPITAL ENCOUNTER (OUTPATIENT)
Dept: ONCOLOGY | Facility: HOSPITAL | Age: 64
Discharge: HOME | End: 2019-01-17
Admitting: INTERNAL MEDICINE

## 2019-01-01 ENCOUNTER — HOSPITAL ENCOUNTER (OUTPATIENT)
Dept: ONCOLOGY | Facility: HOSPITAL | Age: 64
Discharge: HOME | End: 2019-01-24
Admitting: INTERNAL MEDICINE

## 2019-01-01 ENCOUNTER — TELEPHONE (OUTPATIENT)
Dept: ENDOCRINOLOGY | Facility: CLINIC | Age: 64
End: 2019-01-01

## 2019-01-01 ENCOUNTER — ANESTHESIA (OUTPATIENT)
Dept: PERIOP | Facility: HOSPITAL | Age: 64
End: 2019-01-01

## 2019-01-01 ENCOUNTER — HOSPITAL ENCOUNTER (OUTPATIENT)
Facility: HOSPITAL | Age: 64
Setting detail: HOSPITAL OUTPATIENT SURGERY
Discharge: HOME | End: 2019-05-23
Attending: SURGERY | Admitting: SURGERY

## 2019-01-01 ENCOUNTER — HOSPITAL ENCOUNTER (OUTPATIENT)
Dept: ONCOLOGY | Facility: HOSPITAL | Age: 64
Discharge: HOME | End: 2019-02-07
Admitting: INTERNAL MEDICINE

## 2019-01-01 ENCOUNTER — TRANSITIONAL CARE MANAGEMENT TELEPHONE ENCOUNTER (OUTPATIENT)
Dept: INTERNAL MEDICINE | Facility: CLINIC | Age: 64
End: 2019-01-01

## 2019-01-01 ENCOUNTER — APPOINTMENT (OUTPATIENT)
Dept: PREADMISSION TESTING | Facility: HOSPITAL | Age: 64
End: 2019-01-01

## 2019-01-01 VITALS
DIASTOLIC BLOOD PRESSURE: 74 MMHG | SYSTOLIC BLOOD PRESSURE: 150 MMHG | HEART RATE: 76 BPM | TEMPERATURE: 98.4 F | OXYGEN SATURATION: 94 % | RESPIRATION RATE: 20 BRPM | BODY MASS INDEX: 28.75 KG/M2 | WEIGHT: 212 LBS

## 2019-01-01 VITALS
WEIGHT: 245 LBS | BODY MASS INDEX: 33.18 KG/M2 | SYSTOLIC BLOOD PRESSURE: 117 MMHG | HEIGHT: 72 IN | TEMPERATURE: 97.4 F | HEART RATE: 73 BPM | DIASTOLIC BLOOD PRESSURE: 59 MMHG | RESPIRATION RATE: 16 BRPM

## 2019-01-01 VITALS
RESPIRATION RATE: 20 BRPM | SYSTOLIC BLOOD PRESSURE: 124 MMHG | HEART RATE: 67 BPM | WEIGHT: 238 LBS | HEIGHT: 72 IN | BODY MASS INDEX: 32.23 KG/M2 | TEMPERATURE: 97.2 F | DIASTOLIC BLOOD PRESSURE: 59 MMHG

## 2019-01-01 VITALS
WEIGHT: 235 LBS | HEART RATE: 71 BPM | DIASTOLIC BLOOD PRESSURE: 63 MMHG | TEMPERATURE: 97.6 F | RESPIRATION RATE: 16 BRPM | HEIGHT: 72 IN | SYSTOLIC BLOOD PRESSURE: 131 MMHG | BODY MASS INDEX: 31.83 KG/M2

## 2019-01-01 VITALS
HEART RATE: 72 BPM | BODY MASS INDEX: 33.32 KG/M2 | TEMPERATURE: 97.2 F | WEIGHT: 246 LBS | RESPIRATION RATE: 16 BRPM | DIASTOLIC BLOOD PRESSURE: 67 MMHG | SYSTOLIC BLOOD PRESSURE: 135 MMHG | HEIGHT: 72 IN

## 2019-01-01 VITALS
HEART RATE: 56 BPM | HEIGHT: 72 IN | OXYGEN SATURATION: 97 % | WEIGHT: 231.8 LBS | SYSTOLIC BLOOD PRESSURE: 168 MMHG | DIASTOLIC BLOOD PRESSURE: 74 MMHG | BODY MASS INDEX: 31.4 KG/M2

## 2019-01-01 VITALS
OXYGEN SATURATION: 95 % | HEART RATE: 58 BPM | SYSTOLIC BLOOD PRESSURE: 154 MMHG | DIASTOLIC BLOOD PRESSURE: 80 MMHG | RESPIRATION RATE: 18 BRPM | TEMPERATURE: 97.6 F

## 2019-01-01 VITALS
SYSTOLIC BLOOD PRESSURE: 158 MMHG | BODY MASS INDEX: 31.46 KG/M2 | WEIGHT: 232 LBS | DIASTOLIC BLOOD PRESSURE: 80 MMHG | TEMPERATURE: 97.6 F | OXYGEN SATURATION: 98 % | HEART RATE: 67 BPM | RESPIRATION RATE: 18 BRPM

## 2019-01-01 VITALS
HEIGHT: 72 IN | OXYGEN SATURATION: 96 % | TEMPERATURE: 97.2 F | WEIGHT: 234.9 LBS | BODY MASS INDEX: 31.82 KG/M2 | HEART RATE: 86 BPM | DIASTOLIC BLOOD PRESSURE: 76 MMHG | RESPIRATION RATE: 18 BRPM | SYSTOLIC BLOOD PRESSURE: 138 MMHG

## 2019-01-01 VITALS
SYSTOLIC BLOOD PRESSURE: 109 MMHG | BODY MASS INDEX: 31.56 KG/M2 | TEMPERATURE: 97.2 F | RESPIRATION RATE: 16 BRPM | DIASTOLIC BLOOD PRESSURE: 56 MMHG | HEART RATE: 65 BPM | HEIGHT: 72 IN | WEIGHT: 233 LBS

## 2019-01-01 VITALS
WEIGHT: 236 LBS | HEART RATE: 70 BPM | DIASTOLIC BLOOD PRESSURE: 68 MMHG | RESPIRATION RATE: 21 BRPM | SYSTOLIC BLOOD PRESSURE: 108 MMHG | BODY MASS INDEX: 32.01 KG/M2

## 2019-01-01 VITALS
BODY MASS INDEX: 27.58 KG/M2 | DIASTOLIC BLOOD PRESSURE: 60 MMHG | OXYGEN SATURATION: 98 % | HEART RATE: 62 BPM | HEIGHT: 72 IN | SYSTOLIC BLOOD PRESSURE: 142 MMHG | WEIGHT: 203.6 LBS

## 2019-01-01 VITALS
OXYGEN SATURATION: 98 % | SYSTOLIC BLOOD PRESSURE: 142 MMHG | BODY MASS INDEX: 30.54 KG/M2 | RESPIRATION RATE: 18 BRPM | WEIGHT: 225.2 LBS | HEART RATE: 67 BPM | TEMPERATURE: 98 F | DIASTOLIC BLOOD PRESSURE: 78 MMHG

## 2019-01-01 VITALS
BODY MASS INDEX: 32.1 KG/M2 | TEMPERATURE: 97 F | DIASTOLIC BLOOD PRESSURE: 65 MMHG | WEIGHT: 237 LBS | SYSTOLIC BLOOD PRESSURE: 143 MMHG | HEART RATE: 68 BPM | HEIGHT: 72 IN | RESPIRATION RATE: 18 BRPM

## 2019-01-01 VITALS — HEART RATE: 88 BPM | WEIGHT: 224.87 LBS | OXYGEN SATURATION: 99 % | HEIGHT: 72 IN | BODY MASS INDEX: 30.46 KG/M2

## 2019-01-01 VITALS
HEART RATE: 86 BPM | RESPIRATION RATE: 22 BRPM | WEIGHT: 249 LBS | SYSTOLIC BLOOD PRESSURE: 142 MMHG | BODY MASS INDEX: 33.76 KG/M2 | TEMPERATURE: 98 F | DIASTOLIC BLOOD PRESSURE: 78 MMHG

## 2019-01-01 VITALS — OXYGEN SATURATION: 97 % | HEIGHT: 72 IN | BODY MASS INDEX: 26.19 KG/M2 | HEART RATE: 69 BPM | WEIGHT: 193.34 LBS

## 2019-01-01 VITALS — BODY MASS INDEX: 27.04 KG/M2 | HEIGHT: 72 IN | WEIGHT: 199.6 LBS

## 2019-01-01 DIAGNOSIS — N28.9 RENAL INSUFFICIENCY: Primary | ICD-10-CM

## 2019-01-01 DIAGNOSIS — M17.11 PRIMARY OSTEOARTHRITIS OF RIGHT KNEE: Primary | ICD-10-CM

## 2019-01-01 DIAGNOSIS — E78.2 MIXED HYPERLIPIDEMIA: ICD-10-CM

## 2019-01-01 DIAGNOSIS — R31.9 HEMATURIA, UNSPECIFIED TYPE: ICD-10-CM

## 2019-01-01 DIAGNOSIS — E11.9 TYPE 2 DIABETES MELLITUS WITHOUT COMPLICATION, WITH LONG-TERM CURRENT USE OF INSULIN (HCC): ICD-10-CM

## 2019-01-01 DIAGNOSIS — L84 CALLUS: ICD-10-CM

## 2019-01-01 DIAGNOSIS — B35.1 ONYCHOMYCOSIS: ICD-10-CM

## 2019-01-01 DIAGNOSIS — E53.8 COBALAMIN DEFICIENCY: ICD-10-CM

## 2019-01-01 DIAGNOSIS — M15.9 PRIMARY OSTEOARTHRITIS INVOLVING MULTIPLE JOINTS: ICD-10-CM

## 2019-01-01 DIAGNOSIS — Z79.4 TYPE 2 DIABETES MELLITUS WITHOUT COMPLICATION, WITH LONG-TERM CURRENT USE OF INSULIN (HCC): ICD-10-CM

## 2019-01-01 DIAGNOSIS — F32.9 REACTIVE DEPRESSION: ICD-10-CM

## 2019-01-01 DIAGNOSIS — E66.01 MORBID OBESITY DUE TO EXCESS CALORIES (HCC): ICD-10-CM

## 2019-01-01 DIAGNOSIS — M79.661 PAIN AND SWELLING OF RIGHT LOWER LEG: Primary | ICD-10-CM

## 2019-01-01 DIAGNOSIS — E88.9 METABOLIC DISORDER: ICD-10-CM

## 2019-01-01 DIAGNOSIS — R60.9 EDEMA, UNSPECIFIED TYPE: ICD-10-CM

## 2019-01-01 DIAGNOSIS — G47.30 SLEEP APNEA, UNSPECIFIED TYPE: ICD-10-CM

## 2019-01-01 DIAGNOSIS — M79.602 LEFT ARM PAIN: ICD-10-CM

## 2019-01-01 DIAGNOSIS — R05.9 COUGH: ICD-10-CM

## 2019-01-01 DIAGNOSIS — I10 BENIGN ESSENTIAL HYPERTENSION: ICD-10-CM

## 2019-01-01 DIAGNOSIS — D63.1 ANEMIA OF CHRONIC RENAL FAILURE, STAGE 5 (HCC): ICD-10-CM

## 2019-01-01 DIAGNOSIS — M27.9 DISEASE OF JAW: ICD-10-CM

## 2019-01-01 DIAGNOSIS — E03.9 ACQUIRED HYPOTHYROIDISM: Primary | ICD-10-CM

## 2019-01-01 DIAGNOSIS — M10.9 GOUT, UNSPECIFIED CAUSE, UNSPECIFIED CHRONICITY, UNSPECIFIED SITE: ICD-10-CM

## 2019-01-01 DIAGNOSIS — M25.519 ARTHRALGIA OF SHOULDER, UNSPECIFIED LATERALITY: ICD-10-CM

## 2019-01-01 DIAGNOSIS — N18.5 ANEMIA OF CHRONIC RENAL FAILURE, STAGE 5 (HCC): ICD-10-CM

## 2019-01-01 DIAGNOSIS — E87.5 HYPERKALEMIA: ICD-10-CM

## 2019-01-01 DIAGNOSIS — Z99.2 ESRD (END STAGE RENAL DISEASE) ON DIALYSIS (HCC): ICD-10-CM

## 2019-01-01 DIAGNOSIS — K40.90 RIGHT INGUINAL HERNIA: Primary | ICD-10-CM

## 2019-01-01 DIAGNOSIS — Z79.4 TYPE 2 DIABETES MELLITUS WITHOUT COMPLICATION, WITH LONG-TERM CURRENT USE OF INSULIN (HCC): Primary | ICD-10-CM

## 2019-01-01 DIAGNOSIS — N18.6 ESRD (END STAGE RENAL DISEASE) ON DIALYSIS (HCC): ICD-10-CM

## 2019-01-01 DIAGNOSIS — E03.9 ACQUIRED HYPOTHYROIDISM: ICD-10-CM

## 2019-01-01 DIAGNOSIS — M54.30 SCIATICA, UNSPECIFIED LATERALITY: ICD-10-CM

## 2019-01-01 DIAGNOSIS — E87.20 METABOLIC ACIDOSIS: ICD-10-CM

## 2019-01-01 DIAGNOSIS — M71.21 BAKER'S CYST OF KNEE, RIGHT: ICD-10-CM

## 2019-01-01 DIAGNOSIS — Z74.09 IMPAIRED MOBILITY AND ADLS: ICD-10-CM

## 2019-01-01 DIAGNOSIS — K40.90 RIGHT INGUINAL HERNIA: ICD-10-CM

## 2019-01-01 DIAGNOSIS — N18.9 CHRONIC KIDNEY DISEASE, UNSPECIFIED CKD STAGE: Primary | ICD-10-CM

## 2019-01-01 DIAGNOSIS — E11.3293 MILD NONPROLIFERATIVE DIABETIC RETINOPATHY OF BOTH EYES WITHOUT MACULAR EDEMA ASSOCIATED WITH TYPE 2 DIABETES MELLITUS (HCC): ICD-10-CM

## 2019-01-01 DIAGNOSIS — R23.8 LOCALIZED WARMTH OF SKIN: ICD-10-CM

## 2019-01-01 DIAGNOSIS — M71.21 BAKER'S CYST OF KNEE, RIGHT: Primary | ICD-10-CM

## 2019-01-01 DIAGNOSIS — I10 BENIGN ESSENTIAL HYPERTENSION: Primary | ICD-10-CM

## 2019-01-01 DIAGNOSIS — N28.9 RENAL INSUFFICIENCY: ICD-10-CM

## 2019-01-01 DIAGNOSIS — R73.03 PREDIABETES: Primary | ICD-10-CM

## 2019-01-01 DIAGNOSIS — E11.9 TYPE 2 DIABETES MELLITUS WITHOUT COMPLICATION, WITH LONG-TERM CURRENT USE OF INSULIN (HCC): Primary | ICD-10-CM

## 2019-01-01 DIAGNOSIS — M79.89 PAIN AND SWELLING OF RIGHT LOWER LEG: Primary | ICD-10-CM

## 2019-01-01 DIAGNOSIS — I89.0 LYMPHEDEMA OF RIGHT LOWER EXTREMITY: ICD-10-CM

## 2019-01-01 DIAGNOSIS — Z78.9 IMPAIRED MOBILITY AND ADLS: ICD-10-CM

## 2019-01-01 DIAGNOSIS — M25.561 RIGHT KNEE PAIN, UNSPECIFIED CHRONICITY: ICD-10-CM

## 2019-01-01 DIAGNOSIS — M54.5 ACUTE BILATERAL LOW BACK PAIN, WITH SCIATICA PRESENCE UNSPECIFIED: Primary | ICD-10-CM

## 2019-01-01 LAB
ALBUMIN SERPL-MCNC: 2.73 G/DL (ref 3.2–4.8)
ALBUMIN SERPL-MCNC: 3.38 G/DL (ref 3.2–4.8)
ALBUMIN SERPL-MCNC: 3.6 G/DL (ref 3.5–5.2)
ALBUMIN/GLOB SERPL: 1.4 G/DL
ALBUMIN/GLOB SERPL: 1.4 G/DL (ref 1.5–2.5)
ALP SERPL-CCNC: 130 U/L (ref 25–100)
ALP SERPL-CCNC: 163 U/L (ref 39–117)
ALT SERPL W P-5'-P-CCNC: 113 U/L (ref 1–41)
ALT SERPL W P-5'-P-CCNC: 29 U/L (ref 7–40)
ANION GAP SERPL CALCULATED.3IONS-SCNC: 14 MMOL/L (ref 3–11)
ANION GAP SERPL CALCULATED.3IONS-SCNC: 14 MMOL/L (ref 3–11)
ANION GAP SERPL CALCULATED.3IONS-SCNC: 7 MMOL/L (ref 3–11)
ANION GAP SERPL CALCULATED.3IONS-SCNC: 8.4 MMOL/L (ref 5–15)
ANION GAP SERPL CALCULATED.3IONS-SCNC: 9 MMOL/L
ANION GAP SERPL CALCULATED.3IONS-SCNC: ABNORMAL MMOL/L (ref 3–11)
APTT PPP: 38.4 SECONDS (ref 24–37)
ARTERIAL PATENCY WRIST A: ABNORMAL
ARTICHOKE IGE QN: 89 MG/DL (ref 0–130)
AST SERPL-CCNC: 20 U/L (ref 0–33)
AST SERPL-CCNC: 50 U/L (ref 1–40)
ATMOSPHERIC PRESS: ABNORMAL MMHG
BACTERIA UR QL AUTO: ABNORMAL /HPF
BASE EXCESS BLDA CALC-SCNC: <-20 MMOL/L (ref 0–2)
BASOPHILS # BLD AUTO: 0.02 10*3/MM3 (ref 0–0.2)
BASOPHILS # BLD AUTO: 0.02 10*3/MM3 (ref 0–0.2)
BASOPHILS NFR BLD AUTO: 0.2 % (ref 0–1)
BASOPHILS NFR BLD AUTO: 0.2 % (ref 0–1)
BDY SITE: ABNORMAL
BILIRUB SERPL-MCNC: 0.2 MG/DL (ref 0.3–1.2)
BILIRUB SERPL-MCNC: 0.7 MG/DL (ref 0.2–1.2)
BILIRUB UR QL STRIP: NEGATIVE
BNP SERPL-MCNC: 977 PG/ML (ref 0–100)
BODY TEMPERATURE: 37 C
BUN BLD-MCNC: 131 MG/DL (ref 9–23)
BUN BLD-MCNC: 135 MG/DL (ref 9–23)
BUN BLD-MCNC: 23 MG/DL (ref 8–23)
BUN BLD-MCNC: 24 MG/DL (ref 9–23)
BUN BLD-MCNC: 34 MG/DL (ref 8–23)
BUN BLD-MCNC: 78 MG/DL (ref 9–23)
BUN/CREAT SERPL: 13 (ref 7–25)
BUN/CREAT SERPL: 15.4 (ref 7–25)
BUN/CREAT SERPL: 15.9 (ref 7–25)
BUN/CREAT SERPL: 16.5 (ref 7–25)
BUN/CREAT SERPL: 6.9 (ref 7–25)
BUN/CREAT SERPL: 8.7 (ref 7–25)
CALCIUM SPEC-SCNC: 7 MG/DL (ref 8.7–10.4)
CALCIUM SPEC-SCNC: 7.1 MG/DL (ref 8.7–10.4)
CALCIUM SPEC-SCNC: 7.8 MG/DL (ref 8.7–10.4)
CALCIUM SPEC-SCNC: 8.2 MG/DL (ref 8.7–10.4)
CALCIUM SPEC-SCNC: 8.6 MG/DL (ref 8.6–10.5)
CALCIUM SPEC-SCNC: 9.4 MG/DL (ref 8.6–10.5)
CHLORIDE SERPL-SCNC: 107 MMOL/L (ref 98–107)
CHLORIDE SERPL-SCNC: 108 MMOL/L (ref 98–107)
CHLORIDE SERPL-SCNC: 109 MMOL/L (ref 99–109)
CHLORIDE SERPL-SCNC: 109 MMOL/L (ref 99–109)
CHLORIDE SERPL-SCNC: 115 MMOL/L (ref 99–109)
CHLORIDE SERPL-SCNC: 118 MMOL/L (ref 99–109)
CHOLEST SERPL-MCNC: 113 MG/DL (ref 0–200)
CHOLEST SERPL-MCNC: 160 MG/DL (ref 0–200)
CLARITY UR: CLEAR
CO2 BLDA-SCNC: 6.8 MMOL/L (ref 23–27)
CO2 SERPL-SCNC: 10 MMOL/L (ref 20–31)
CO2 SERPL-SCNC: 16 MMOL/L (ref 20–31)
CO2 SERPL-SCNC: 24.6 MMOL/L (ref 22–29)
CO2 SERPL-SCNC: 26 MMOL/L (ref 22–29)
CO2 SERPL-SCNC: 27 MMOL/L (ref 20–31)
CO2 SERPL-SCNC: <10 MMOL/L (ref 20–31)
COHGB MFR BLD: -0.5 % (ref 0–2)
COLOR UR: YELLOW
CREAT BLD-MCNC: 1.39 MG/DL (ref 0.76–1.27)
CREAT BLD-MCNC: 3.48 MG/DL (ref 0.6–1.3)
CREAT BLD-MCNC: 3.89 MG/DL (ref 0.76–1.27)
CREAT BLD-MCNC: 5.2 MG/DL (ref 0.6–1.3)
CREAT BLD-MCNC: 5.32 MG/DL (ref 0.6–1.3)
CREAT BLD-MCNC: 5.54 MG/DL (ref 0.6–1.3)
CREAT BLD-MCNC: 6 MG/DL (ref 0.6–1.3)
CREAT BLD-MCNC: 6.18 MG/DL (ref 0.6–1.3)
CREAT BLD-MCNC: 6.25 MG/DL (ref 0.6–1.3)
CREAT BLD-MCNC: 6.78 MG/DL (ref 0.6–1.3)
CREAT BLD-MCNC: 8.47 MG/DL (ref 0.6–1.3)
CREAT BLD-MCNC: 8.5 MG/DL (ref 0.6–1.3)
D DIMER PPP FEU-MCNC: 1.99 MCGFEU/ML (ref 0–0.56)
DEPRECATED RDW RBC AUTO: 55.6 FL (ref 37–54)
DEPRECATED RDW RBC AUTO: 57.1 FL (ref 37–54)
DEPRECATED RDW RBC AUTO: 57.8 FL (ref 37–54)
EOSINOPHIL # BLD AUTO: 0.04 10*3/MM3 (ref 0–0.3)
EOSINOPHIL # BLD AUTO: 0.09 10*3/MM3 (ref 0–0.3)
EOSINOPHIL NFR BLD AUTO: 0.3 % (ref 0–3)
EOSINOPHIL NFR BLD AUTO: 1 % (ref 0–3)
ERYTHROCYTE [DISTWIDTH] IN BLOOD BY AUTOMATED COUNT: 15.8 % (ref 11.3–14.5)
ERYTHROCYTE [DISTWIDTH] IN BLOOD BY AUTOMATED COUNT: 16.1 % (ref 11.3–14.5)
ERYTHROCYTE [DISTWIDTH] IN BLOOD BY AUTOMATED COUNT: 16.3 % (ref 11.3–14.5)
FERRITIN SERPL-MCNC: 135 NG/ML (ref 22–322)
FERRITIN SERPL-MCNC: 166 NG/ML (ref 22–322)
FERRITIN SERPL-MCNC: 49 NG/ML (ref 22–322)
GFR SERPL CREATININE-BSD FRML MDRD: 10 ML/MIN/1.73
GFR SERPL CREATININE-BSD FRML MDRD: 11 ML/MIN/1.73
GFR SERPL CREATININE-BSD FRML MDRD: 11 ML/MIN/1.73
GFR SERPL CREATININE-BSD FRML MDRD: 16 ML/MIN/1.73
GFR SERPL CREATININE-BSD FRML MDRD: 18 ML/MIN/1.73
GFR SERPL CREATININE-BSD FRML MDRD: 51 ML/MIN/1.73
GFR SERPL CREATININE-BSD FRML MDRD: 6 ML/MIN/1.73
GFR SERPL CREATININE-BSD FRML MDRD: 6 ML/MIN/1.73
GFR SERPL CREATININE-BSD FRML MDRD: 8 ML/MIN/1.73
GFR SERPL CREATININE-BSD FRML MDRD: 9 ML/MIN/1.73
GLOBULIN UR ELPH-MCNC: 2.4 GM/DL
GLOBULIN UR ELPH-MCNC: 2.5 GM/DL
GLUCOSE BLD-MCNC: 104 MG/DL (ref 70–100)
GLUCOSE BLD-MCNC: 110 MG/DL (ref 70–100)
GLUCOSE BLD-MCNC: 123 MG/DL (ref 70–100)
GLUCOSE BLD-MCNC: 136 MG/DL (ref 70–100)
GLUCOSE BLD-MCNC: 191 MG/DL (ref 65–99)
GLUCOSE BLD-MCNC: 97 MG/DL (ref 65–99)
GLUCOSE BLDC GLUCOMTR-MCNC: 103 MG/DL (ref 70–130)
GLUCOSE BLDC GLUCOMTR-MCNC: 117 MG/DL (ref 70–130)
GLUCOSE BLDC GLUCOMTR-MCNC: 130 MG/DL (ref 70–130)
GLUCOSE BLDC GLUCOMTR-MCNC: 135 MG/DL (ref 70–130)
GLUCOSE BLDC GLUCOMTR-MCNC: 190 MG/DL (ref 70–130)
GLUCOSE BLDC GLUCOMTR-MCNC: 90 MG/DL (ref 70–130)
GLUCOSE BLDC GLUCOMTR-MCNC: 98 MG/DL (ref 70–130)
GLUCOSE UR STRIP-MCNC: NEGATIVE MG/DL
HAV IGM SERPL QL IA: NORMAL
HBA1C MFR BLD: 5 % (ref 4.8–5.6)
HBA1C MFR BLD: 5.4 %
HBA1C MFR BLD: 6.2 %
HBV CORE IGM SERPL QL IA: NORMAL
HBV SURFACE AG SERPL QL IA: NORMAL
HCO3 BLDA-SCNC: 6.3 MMOL/L (ref 20–26)
HCT VFR BLD AUTO: 27.3 % (ref 38.9–50.9)
HCT VFR BLD AUTO: 28.4 % (ref 38.9–50.9)
HCT VFR BLD AUTO: 29.4 % (ref 38.9–50.9)
HCT VFR BLD AUTO: 29.7 % (ref 38.9–50.9)
HCT VFR BLD AUTO: 29.8 % (ref 38.9–50.9)
HCT VFR BLD AUTO: 31.5 % (ref 38.9–50.9)
HCT VFR BLD AUTO: 32.3 % (ref 38.9–50.9)
HCT VFR BLD AUTO: 33.3 % (ref 38.9–50.9)
HCT VFR BLD AUTO: 36.6 % (ref 38.9–50.9)
HCT VFR BLD CALC: 30.3 %
HCV AB SER DONR QL: NORMAL
HDLC SERPL-MCNC: 50 MG/DL (ref 40–60)
HDLC SERPL-MCNC: 61 MG/DL (ref 40–60)
HGB BLD-MCNC: 10 G/DL (ref 13.1–17.5)
HGB BLD-MCNC: 10.2 G/DL (ref 13.1–17.5)
HGB BLD-MCNC: 10.7 G/DL (ref 13.1–17.5)
HGB BLD-MCNC: 11.7 G/DL (ref 13.1–17.5)
HGB BLD-MCNC: 8.9 G/DL (ref 13.1–17.5)
HGB BLD-MCNC: 9 G/DL (ref 13.1–17.5)
HGB BLD-MCNC: 9.5 G/DL (ref 13.1–17.5)
HGB BLD-MCNC: 9.7 G/DL (ref 13.1–17.5)
HGB BLD-MCNC: 9.7 G/DL (ref 13.1–17.5)
HGB BLDA-MCNC: 9.9 G/DL (ref 13.5–17.5)
HGB UR QL STRIP.AUTO: ABNORMAL
HOLD SPECIMEN: NORMAL
HOLD SPECIMEN: NORMAL
HOROWITZ INDEX BLD+IHG-RTO: 21 %
HYALINE CASTS UR QL AUTO: ABNORMAL /LPF
IMM GRANULOCYTES # BLD AUTO: 0.03 10*3/MM3 (ref 0–0.05)
IMM GRANULOCYTES # BLD AUTO: 0.03 10*3/MM3 (ref 0–0.05)
IMM GRANULOCYTES NFR BLD AUTO: 0.2 % (ref 0–0.6)
IMM GRANULOCYTES NFR BLD AUTO: 0.3 % (ref 0–0.6)
INR PPP: 1.26 (ref 0.85–1.16)
IRON 24H UR-MRATE: 29 MCG/DL (ref 50–175)
IRON 24H UR-MRATE: 56 MCG/DL (ref 50–175)
IRON 24H UR-MRATE: 78 MCG/DL (ref 50–175)
IRON 24H UR-MRATE: 97 MCG/DL (ref 50–175)
IRON SATN MFR SERPL: 13 % (ref 20–50)
IRON SATN MFR SERPL: 34 % (ref 20–50)
IRON SATN MFR SERPL: 41 % (ref 20–50)
IRON SATN MFR SERPL: 58 % (ref 20–50)
KETONES UR QL STRIP: NEGATIVE
LDLC SERPL CALC-MCNC: 45 MG/DL (ref 0–100)
LDLC/HDLC SERPL: 0.9 {RATIO}
LEUKOCYTE ESTERASE UR QL STRIP.AUTO: NEGATIVE
LYMPHOCYTES # BLD AUTO: 0.7 10*3/MM3 (ref 0.6–4.8)
LYMPHOCYTES # BLD AUTO: 0.74 10*3/MM3 (ref 0.6–4.8)
LYMPHOCYTES NFR BLD AUTO: 5.5 % (ref 24–44)
LYMPHOCYTES NFR BLD AUTO: 8.1 % (ref 24–44)
Lab: ABNORMAL
MAGNESIUM SERPL-MCNC: 1.9 MG/DL (ref 1.3–2.7)
MCH RBC QN AUTO: 30.4 PG (ref 27–31)
MCH RBC QN AUTO: 30.9 PG (ref 27–31)
MCH RBC QN AUTO: 31.1 PG (ref 27–31)
MCHC RBC AUTO-ENTMCNC: 31.3 G/DL (ref 32–36)
MCHC RBC AUTO-ENTMCNC: 31.7 G/DL (ref 32–36)
MCHC RBC AUTO-ENTMCNC: 33 G/DL (ref 32–36)
MCV RBC AUTO: 94.5 FL (ref 80–99)
MCV RBC AUTO: 96.9 FL (ref 80–99)
MCV RBC AUTO: 97.2 FL (ref 80–99)
METHGB BLD QL: 3.7 % (ref 0–1.5)
MODALITY: ABNORMAL
MONOCYTES # BLD AUTO: 0.51 10*3/MM3 (ref 0–1)
MONOCYTES # BLD AUTO: 0.61 10*3/MM3 (ref 0–1)
MONOCYTES NFR BLD AUTO: 4.8 % (ref 0–12)
MONOCYTES NFR BLD AUTO: 5.5 % (ref 0–12)
NEUTROPHILS # BLD AUTO: 11.3 10*3/MM3 (ref 1.5–8.3)
NEUTROPHILS # BLD AUTO: 7.83 10*3/MM3 (ref 1.5–8.3)
NEUTROPHILS NFR BLD AUTO: 85.2 % (ref 41–71)
NEUTROPHILS NFR BLD AUTO: 89.2 % (ref 41–71)
NITRITE UR QL STRIP: NEGATIVE
NOTE: ABNORMAL
NOTIFIED BY: ABNORMAL
NOTIFIED WHO: ABNORMAL
OXYHGB MFR BLDV: 91.9 % (ref 94–99)
PCO2 BLDA: 18 MM HG
PCO2 TEMP ADJ BLD: 18 MM HG (ref 35–48)
PH BLDA: 7.15 PH UNITS (ref 7.35–7.45)
PH UR STRIP.AUTO: <=5 [PH] (ref 5–8)
PH, TEMP CORRECTED: 7.15 PH UNITS
PHOSPHATE SERPL-MCNC: 12.2 MG/DL (ref 2.4–5.1)
PLATELET # BLD AUTO: 179 10*3/MM3 (ref 150–450)
PLATELET # BLD AUTO: 190 10*3/MM3 (ref 150–450)
PLATELET # BLD AUTO: 211 10*3/MM3 (ref 150–450)
PMV BLD AUTO: 10.2 FL (ref 6–12)
PMV BLD AUTO: 10.7 FL (ref 6–12)
PMV BLD AUTO: 10.9 FL (ref 6–12)
PO2 BLDA: 125 MM HG (ref 83–108)
PO2 TEMP ADJ BLD: 125 MM HG (ref 83–108)
POTASSIUM BLD-SCNC: 3.3 MMOL/L (ref 3.5–5.5)
POTASSIUM BLD-SCNC: 3.6 MMOL/L (ref 3.5–5.2)
POTASSIUM BLD-SCNC: 4.1 MMOL/L (ref 3.5–5.5)
POTASSIUM BLD-SCNC: 4.1 MMOL/L (ref 3.5–5.5)
POTASSIUM BLD-SCNC: 4.3 MMOL/L (ref 3.5–5.5)
POTASSIUM BLD-SCNC: 4.4 MMOL/L (ref 3.5–5.5)
POTASSIUM BLD-SCNC: 4.8 MMOL/L (ref 3.5–5.5)
POTASSIUM BLD-SCNC: 4.8 MMOL/L (ref 3.5–5.5)
POTASSIUM BLD-SCNC: 4.9 MMOL/L (ref 3.5–5.2)
POTASSIUM BLD-SCNC: 5.1 MMOL/L (ref 3.5–5.5)
POTASSIUM BLD-SCNC: 5.2 MMOL/L (ref 3.5–5.5)
POTASSIUM BLD-SCNC: 5.3 MMOL/L (ref 3.5–5.5)
PROT SERPL-MCNC: 5.8 G/DL (ref 5.7–8.2)
PROT SERPL-MCNC: 6.1 G/DL (ref 6–8.5)
PROT UR QL STRIP: ABNORMAL
PROTHROMBIN TIME: 15.2 SECONDS (ref 11.2–14.3)
RBC # BLD AUTO: 2.89 10*6/MM3 (ref 4.2–5.76)
RBC # BLD AUTO: 2.93 10*6/MM3 (ref 4.2–5.76)
RBC # BLD AUTO: 3.24 10*6/MM3 (ref 4.2–5.76)
RBC # UR: ABNORMAL /HPF
REF LAB TEST METHOD: ABNORMAL
SODIUM BLD-SCNC: 139 MMOL/L (ref 132–146)
SODIUM BLD-SCNC: 141 MMOL/L (ref 136–145)
SODIUM BLD-SCNC: 142 MMOL/L (ref 132–146)
SODIUM BLD-SCNC: 142 MMOL/L (ref 132–146)
SODIUM BLD-SCNC: 142 MMOL/L (ref 136–145)
SODIUM BLD-SCNC: 143 MMOL/L (ref 132–146)
SP GR UR STRIP: 1.01 (ref 1–1.03)
SQUAMOUS #/AREA URNS HPF: ABNORMAL /HPF
T4 FREE SERPL-MCNC: 0.78 NG/DL (ref 0.89–1.76)
T4 FREE SERPL-MCNC: 1.11 NG/DL (ref 0.93–1.7)
T4 FREE SERPL-MCNC: 1.44 NG/DL (ref 0.93–1.7)
TIBC SERPL-MCNC: 163 MCG/DL (ref 250–450)
TIBC SERPL-MCNC: 167 MCG/DL (ref 250–450)
TIBC SERPL-MCNC: 189 MCG/DL (ref 250–450)
TIBC SERPL-MCNC: 216 MCG/DL (ref 250–450)
TRIGL SERPL-MCNC: 115 MG/DL (ref 0–150)
TRIGL SERPL-MCNC: 89 MG/DL (ref 0–150)
TROPONIN I SERPL-MCNC: 0.03 NG/ML
TROPONIN I SERPL-MCNC: 0.04 NG/ML
TSH SERPL DL<=0.05 MIU/L-ACNC: 2.18 MIU/ML (ref 0.27–4.2)
TSH SERPL DL<=0.05 MIU/L-ACNC: 84.68 MIU/ML (ref 0.35–5.35)
TSH SERPL DL<=0.05 MIU/L-ACNC: 9.2 MIU/ML (ref 0.27–4.2)
UROBILINOGEN UR QL STRIP: ABNORMAL
VENTILATOR MODE: ABNORMAL
VLDLC SERPL-MCNC: 17.8 MG/DL (ref 5–40)
WBC NRBC COR # BLD: 12.67 10*3/MM3 (ref 3.5–10.8)
WBC NRBC COR # BLD: 9.19 10*3/MM3 (ref 3.5–10.8)
WBC NRBC COR # BLD: 9.29 10*3/MM3 (ref 3.5–10.8)
WBC UR QL AUTO: ABNORMAL /HPF
WHOLE BLOOD HOLD SPECIMEN: NORMAL
WHOLE BLOOD HOLD SPECIMEN: NORMAL

## 2019-01-01 PROCEDURE — 36415 COLL VENOUS BLD VENIPUNCTURE: CPT

## 2019-01-01 PROCEDURE — 80053 COMPREHEN METABOLIC PANEL: CPT | Performed by: EMERGENCY MEDICINE

## 2019-01-01 PROCEDURE — 02HV33Z INSERTION OF INFUSION DEVICE INTO SUPERIOR VENA CAVA, PERCUTANEOUS APPROACH: ICD-10-PCS | Performed by: INTERNAL MEDICINE

## 2019-01-01 PROCEDURE — 85018 HEMOGLOBIN: CPT | Performed by: INTERNAL MEDICINE

## 2019-01-01 PROCEDURE — 80048 BASIC METABOLIC PNL TOTAL CA: CPT | Performed by: INTERNAL MEDICINE

## 2019-01-01 PROCEDURE — 83036 HEMOGLOBIN GLYCOSYLATED A1C: CPT | Performed by: INTERNAL MEDICINE

## 2019-01-01 PROCEDURE — 96372 THER/PROPH/DIAG INJ SC/IM: CPT | Performed by: INTERNAL MEDICINE

## 2019-01-01 PROCEDURE — 96372 THER/PROPH/DIAG INJ SC/IM: CPT

## 2019-01-01 PROCEDURE — 82962 GLUCOSE BLOOD TEST: CPT

## 2019-01-01 PROCEDURE — 63510000001 EPOETIN ALFA PER 1000 UNITS: Performed by: INTERNAL MEDICINE

## 2019-01-01 PROCEDURE — 25010000002 BUPRENORPHINE PER 0.1 MG: Performed by: ANESTHESIOLOGY

## 2019-01-01 PROCEDURE — 99239 HOSP IP/OBS DSCHRG MGMT >30: CPT | Performed by: PHYSICIAN ASSISTANT

## 2019-01-01 PROCEDURE — 82565 ASSAY OF CREATININE: CPT | Performed by: INTERNAL MEDICINE

## 2019-01-01 PROCEDURE — 83550 IRON BINDING TEST: CPT | Performed by: INTERNAL MEDICINE

## 2019-01-01 PROCEDURE — 84132 ASSAY OF SERUM POTASSIUM: CPT | Performed by: INTERNAL MEDICINE

## 2019-01-01 PROCEDURE — 82728 ASSAY OF FERRITIN: CPT | Performed by: INTERNAL MEDICINE

## 2019-01-01 PROCEDURE — 97166 OT EVAL MOD COMPLEX 45 MIN: CPT | Performed by: OCCUPATIONAL THERAPIST

## 2019-01-01 PROCEDURE — 85014 HEMATOCRIT: CPT | Performed by: INTERNAL MEDICINE

## 2019-01-01 PROCEDURE — 36415 COLL VENOUS BLD VENIPUNCTURE: CPT | Performed by: PHYSICIAN ASSISTANT

## 2019-01-01 PROCEDURE — 99232 SBSQ HOSP IP/OBS MODERATE 35: CPT | Performed by: NURSE PRACTITIONER

## 2019-01-01 PROCEDURE — 25010000002 FENTANYL CITRATE (PF) 100 MCG/2ML SOLUTION: Performed by: NURSE ANESTHETIST, CERTIFIED REGISTERED

## 2019-01-01 PROCEDURE — 85610 PROTHROMBIN TIME: CPT | Performed by: INTERNAL MEDICINE

## 2019-01-01 PROCEDURE — 85025 COMPLETE CBC W/AUTO DIFF WBC: CPT | Performed by: NURSE PRACTITIONER

## 2019-01-01 PROCEDURE — 25010000002 PROPOFOL 1000 MG/ML EMULSION: Performed by: NURSE ANESTHETIST, CERTIFIED REGISTERED

## 2019-01-01 PROCEDURE — 82805 BLOOD GASES W/O2 SATURATION: CPT

## 2019-01-01 PROCEDURE — 25010000002 PROPOFOL 10 MG/ML EMULSION: Performed by: NURSE ANESTHETIST, CERTIFIED REGISTERED

## 2019-01-01 PROCEDURE — 85730 THROMBOPLASTIN TIME PARTIAL: CPT | Performed by: NURSE PRACTITIONER

## 2019-01-01 PROCEDURE — C1750 CATH, HEMODIALYSIS,LONG-TERM: HCPCS | Performed by: SURGERY

## 2019-01-01 PROCEDURE — 25010000003 CEFAZOLIN IN DEXTROSE 2-4 GM/100ML-% SOLUTION: Performed by: SURGERY

## 2019-01-01 PROCEDURE — 20610 DRAIN/INJ JOINT/BURSA W/O US: CPT | Performed by: ORTHOPAEDIC SURGERY

## 2019-01-01 PROCEDURE — 80074 ACUTE HEPATITIS PANEL: CPT | Performed by: INTERNAL MEDICINE

## 2019-01-01 PROCEDURE — 83540 ASSAY OF IRON: CPT | Performed by: INTERNAL MEDICINE

## 2019-01-01 PROCEDURE — 84439 ASSAY OF FREE THYROXINE: CPT | Performed by: INTERNAL MEDICINE

## 2019-01-01 PROCEDURE — 99214 OFFICE O/P EST MOD 30 MIN: CPT | Performed by: INTERNAL MEDICINE

## 2019-01-01 PROCEDURE — 25010000002 DEXAMETHASONE SODIUM PHOSPHATE 10 MG/ML SOLUTION: Performed by: ANESTHESIOLOGY

## 2019-01-01 PROCEDURE — 25010000002 HEPARIN (PORCINE) PER 1000 UNITS: Performed by: INTERNAL MEDICINE

## 2019-01-01 PROCEDURE — 71045 X-RAY EXAM CHEST 1 VIEW: CPT

## 2019-01-01 PROCEDURE — 85379 FIBRIN DEGRADATION QUANT: CPT | Performed by: PHYSICIAN ASSISTANT

## 2019-01-01 PROCEDURE — 99204 OFFICE O/P NEW MOD 45 MIN: CPT | Performed by: ORTHOPAEDIC SURGERY

## 2019-01-01 PROCEDURE — 97162 PT EVAL MOD COMPLEX 30 MIN: CPT

## 2019-01-01 PROCEDURE — 99213 OFFICE O/P EST LOW 20 MIN: CPT | Performed by: ORTHOPAEDIC SURGERY

## 2019-01-01 PROCEDURE — 80048 BASIC METABOLIC PNL TOTAL CA: CPT | Performed by: ANESTHESIOLOGY

## 2019-01-01 PROCEDURE — C1781 MESH (IMPLANTABLE): HCPCS | Performed by: SURGERY

## 2019-01-01 PROCEDURE — 83735 ASSAY OF MAGNESIUM: CPT | Performed by: NURSE PRACTITIONER

## 2019-01-01 PROCEDURE — 25010000002 HEPARIN (PORCINE) PER 1000 UNITS: Performed by: SURGERY

## 2019-01-01 PROCEDURE — 99213 OFFICE O/P EST LOW 20 MIN: CPT | Performed by: PHYSICIAN ASSISTANT

## 2019-01-01 PROCEDURE — 99233 SBSQ HOSP IP/OBS HIGH 50: CPT | Performed by: INTERNAL MEDICINE

## 2019-01-01 PROCEDURE — 93005 ELECTROCARDIOGRAM TRACING: CPT | Performed by: EMERGENCY MEDICINE

## 2019-01-01 PROCEDURE — 93010 ELECTROCARDIOGRAM REPORT: CPT | Performed by: INTERNAL MEDICINE

## 2019-01-01 PROCEDURE — 81001 URINALYSIS AUTO W/SCOPE: CPT | Performed by: NURSE PRACTITIONER

## 2019-01-01 PROCEDURE — 99284 EMERGENCY DEPT VISIT MOD MDM: CPT

## 2019-01-01 PROCEDURE — 83880 ASSAY OF NATRIURETIC PEPTIDE: CPT | Performed by: EMERGENCY MEDICINE

## 2019-01-01 PROCEDURE — 76000 FLUOROSCOPY <1 HR PHYS/QHP: CPT

## 2019-01-01 PROCEDURE — 84484 ASSAY OF TROPONIN QUANT: CPT | Performed by: EMERGENCY MEDICINE

## 2019-01-01 PROCEDURE — 84484 ASSAY OF TROPONIN QUANT: CPT | Performed by: INTERNAL MEDICINE

## 2019-01-01 PROCEDURE — 25010000002 DEXAMETHASONE PER 1 MG: Performed by: NURSE ANESTHETIST, CERTIFIED REGISTERED

## 2019-01-01 PROCEDURE — 99223 1ST HOSP IP/OBS HIGH 75: CPT | Performed by: INTERNAL MEDICINE

## 2019-01-01 PROCEDURE — 80061 LIPID PANEL: CPT | Performed by: INTERNAL MEDICINE

## 2019-01-01 PROCEDURE — 84443 ASSAY THYROID STIM HORMONE: CPT | Performed by: INTERNAL MEDICINE

## 2019-01-01 PROCEDURE — 0JH63XZ INSERTION OF TUNNELED VASCULAR ACCESS DEVICE INTO CHEST SUBCUTANEOUS TISSUE AND FASCIA, PERCUTANEOUS APPROACH: ICD-10-PCS | Performed by: INTERNAL MEDICINE

## 2019-01-01 PROCEDURE — 85025 COMPLETE CBC W/AUTO DIFF WBC: CPT | Performed by: EMERGENCY MEDICINE

## 2019-01-01 PROCEDURE — 80053 COMPREHEN METABOLIC PANEL: CPT | Performed by: INTERNAL MEDICINE

## 2019-01-01 PROCEDURE — 82947 ASSAY GLUCOSE BLOOD QUANT: CPT | Performed by: INTERNAL MEDICINE

## 2019-01-01 PROCEDURE — 83036 HEMOGLOBIN GLYCOSYLATED A1C: CPT | Performed by: NURSE PRACTITIONER

## 2019-01-01 PROCEDURE — 80048 BASIC METABOLIC PNL TOTAL CA: CPT | Performed by: PHYSICIAN ASSISTANT

## 2019-01-01 PROCEDURE — 82962 GLUCOSE BLOOD TEST: CPT | Performed by: INTERNAL MEDICINE

## 2019-01-01 PROCEDURE — 36600 WITHDRAWAL OF ARTERIAL BLOOD: CPT

## 2019-01-01 PROCEDURE — 93005 ELECTROCARDIOGRAM TRACING: CPT | Performed by: NURSE PRACTITIONER

## 2019-01-01 PROCEDURE — 36415 COLL VENOUS BLD VENIPUNCTURE: CPT | Performed by: INTERNAL MEDICINE

## 2019-01-01 PROCEDURE — 80069 RENAL FUNCTION PANEL: CPT | Performed by: INTERNAL MEDICINE

## 2019-01-01 PROCEDURE — 85027 COMPLETE CBC AUTOMATED: CPT | Performed by: INTERNAL MEDICINE

## 2019-01-01 PROCEDURE — B5181ZA FLUOROSCOPY OF SUPERIOR VENA CAVA USING LOW OSMOLAR CONTRAST, GUIDANCE: ICD-10-PCS | Performed by: INTERNAL MEDICINE

## 2019-01-01 PROCEDURE — 99495 TRANSJ CARE MGMT MOD F2F 14D: CPT | Performed by: INTERNAL MEDICINE

## 2019-01-01 DEVICE — MESH FLUT SHT 3X6IN: Type: IMPLANTABLE DEVICE | Site: GROIN | Status: FUNCTIONAL

## 2019-01-01 RX ORDER — ATORVASTATIN CALCIUM 20 MG/1
20 TABLET, FILM COATED ORAL DAILY
Qty: 90 TABLET | Refills: 1 | Status: SHIPPED | OUTPATIENT
Start: 2019-01-01 | End: 2020-08-07

## 2019-01-01 RX ORDER — TERAZOSIN 2 MG/1
2 CAPSULE ORAL NIGHTLY
Status: DISCONTINUED | OUTPATIENT
Start: 2019-01-01 | End: 2019-01-01 | Stop reason: HOSPADM

## 2019-01-01 RX ORDER — ALLOPURINOL 100 MG/1
100 TABLET ORAL DAILY
Qty: 90 TABLET | Refills: 2 | Status: SHIPPED | OUTPATIENT
Start: 2019-01-01 | End: 2019-01-01 | Stop reason: SDUPTHER

## 2019-01-01 RX ORDER — BUPRENORPHINE HYDROCHLORIDE 0.32 MG/ML
INJECTION INTRAMUSCULAR; INTRAVENOUS
Status: COMPLETED | OUTPATIENT
Start: 2019-01-01 | End: 2019-01-01

## 2019-01-01 RX ORDER — EPHEDRINE SULFATE 50 MG/ML
5 INJECTION, SOLUTION INTRAVENOUS ONCE AS NEEDED
Status: DISCONTINUED | OUTPATIENT
Start: 2019-01-01 | End: 2019-01-01 | Stop reason: HOSPADM

## 2019-01-01 RX ORDER — TACROLIMUS 1 MG/1
CAPSULE ORAL
COMMUNITY
Start: 2019-01-01

## 2019-01-01 RX ORDER — FENTANYL CITRATE 50 UG/ML
INJECTION, SOLUTION INTRAMUSCULAR; INTRAVENOUS AS NEEDED
Status: DISCONTINUED | OUTPATIENT
Start: 2019-01-01 | End: 2019-01-01 | Stop reason: SURG

## 2019-01-01 RX ORDER — AMLODIPINE BESYLATE 10 MG/1
10 TABLET ORAL
Status: DISCONTINUED | OUTPATIENT
Start: 2019-01-01 | End: 2019-01-01 | Stop reason: HOSPADM

## 2019-01-01 RX ORDER — LIDOCAINE HYDROCHLORIDE 10 MG/ML
3 INJECTION, SOLUTION EPIDURAL; INFILTRATION; INTRACAUDAL; PERINEURAL
Status: COMPLETED | OUTPATIENT
Start: 2019-01-01 | End: 2019-01-01

## 2019-01-01 RX ORDER — VENLAFAXINE 37.5 MG/1
37.5 TABLET ORAL 2 TIMES DAILY
Status: DISCONTINUED | OUTPATIENT
Start: 2019-01-01 | End: 2019-01-01 | Stop reason: HOSPADM

## 2019-01-01 RX ORDER — SODIUM BICARBONATE 650 MG/1
650 TABLET ORAL 2 TIMES DAILY
COMMUNITY
End: 2019-01-01 | Stop reason: HOSPADM

## 2019-01-01 RX ORDER — PROPOFOL 10 MG/ML
VIAL (ML) INTRAVENOUS AS NEEDED
Status: DISCONTINUED | OUTPATIENT
Start: 2019-01-01 | End: 2019-01-01 | Stop reason: SURG

## 2019-01-01 RX ORDER — SODIUM CHLORIDE 9 MG/ML
9 INJECTION, SOLUTION INTRAVENOUS CONTINUOUS
Status: DISCONTINUED | OUTPATIENT
Start: 2019-01-01 | End: 2019-01-01 | Stop reason: HOSPADM

## 2019-01-01 RX ORDER — GABAPENTIN 800 MG/1
400 TABLET ORAL DAILY
Qty: 90 TABLET | Refills: 1 | Status: SHIPPED | OUTPATIENT
Start: 2019-01-01 | End: 2019-01-01

## 2019-01-01 RX ORDER — ALBUMIN (HUMAN) 12.5 G/50ML
12.5 SOLUTION INTRAVENOUS AS NEEDED
Status: ACTIVE | OUTPATIENT
Start: 2019-01-01 | End: 2019-01-01

## 2019-01-01 RX ORDER — BUPIVACAINE HYDROCHLORIDE 2.5 MG/ML
INJECTION, SOLUTION EPIDURAL; INFILTRATION; INTRACAUDAL
Status: COMPLETED | OUTPATIENT
Start: 2019-01-01 | End: 2019-01-01

## 2019-01-01 RX ORDER — FENTANYL CITRATE 50 UG/ML
50 INJECTION, SOLUTION INTRAMUSCULAR; INTRAVENOUS
Status: DISCONTINUED | OUTPATIENT
Start: 2019-01-01 | End: 2019-01-01 | Stop reason: HOSPADM

## 2019-01-01 RX ORDER — CALCIUM ACETATE 667 MG/1
CAPSULE ORAL
Status: ON HOLD | COMMUNITY
Start: 2019-01-01 | End: 2019-01-01 | Stop reason: SDUPTHER

## 2019-01-01 RX ORDER — HYDROCODONE BITARTRATE AND ACETAMINOPHEN 5; 325 MG/1; MG/1
1 TABLET ORAL EVERY 6 HOURS PRN
Status: DISCONTINUED | OUTPATIENT
Start: 2019-01-01 | End: 2019-01-01 | Stop reason: HOSPADM

## 2019-01-01 RX ORDER — CEFAZOLIN SODIUM 2 G/100ML
2 INJECTION, SOLUTION INTRAVENOUS ONCE
Status: COMPLETED | OUTPATIENT
Start: 2019-01-01 | End: 2019-01-01

## 2019-01-01 RX ORDER — ONDANSETRON 2 MG/ML
4 INJECTION INTRAMUSCULAR; INTRAVENOUS ONCE AS NEEDED
Status: DISCONTINUED | OUTPATIENT
Start: 2019-01-01 | End: 2019-01-01 | Stop reason: HOSPADM

## 2019-01-01 RX ORDER — FAMOTIDINE 20 MG/1
20 TABLET, FILM COATED ORAL
Status: DISCONTINUED | OUTPATIENT
Start: 2019-01-01 | End: 2019-01-01 | Stop reason: HOSPADM

## 2019-01-01 RX ORDER — CALCITRIOL 0.25 UG/1
0.25 CAPSULE, LIQUID FILLED ORAL DAILY
Status: DISCONTINUED | OUTPATIENT
Start: 2019-01-01 | End: 2019-01-01 | Stop reason: HOSPADM

## 2019-01-01 RX ORDER — AMLODIPINE BESYLATE 10 MG/1
TABLET ORAL
Status: ON HOLD | COMMUNITY
Start: 2019-01-01 | End: 2019-01-01 | Stop reason: SDUPTHER

## 2019-01-01 RX ORDER — CALCITRIOL 0.25 UG/1
0.25 CAPSULE, LIQUID FILLED ORAL DAILY
Start: 2019-01-01 | End: 2019-01-01 | Stop reason: ALTCHOICE

## 2019-01-01 RX ORDER — CALCITRIOL 0.25 UG/1
CAPSULE, LIQUID FILLED ORAL
Status: ON HOLD | COMMUNITY
Start: 2019-01-01 | End: 2019-01-01 | Stop reason: SDUPTHER

## 2019-01-01 RX ORDER — IRON POLYSACCHARIDE COMPLEX 150 MG
150 CAPSULE ORAL 2 TIMES DAILY
COMMUNITY

## 2019-01-01 RX ORDER — TRIAMCINOLONE ACETONIDE 40 MG/ML
80 INJECTION, SUSPENSION INTRA-ARTICULAR; INTRAMUSCULAR
Status: COMPLETED | OUTPATIENT
Start: 2019-01-01 | End: 2019-01-01

## 2019-01-01 RX ORDER — LABETALOL HYDROCHLORIDE 5 MG/ML
5 INJECTION, SOLUTION INTRAVENOUS
Status: DISCONTINUED | OUTPATIENT
Start: 2019-01-01 | End: 2019-01-01 | Stop reason: HOSPADM

## 2019-01-01 RX ORDER — SODIUM CHLORIDE 0.9 % (FLUSH) 0.9 %
10 SYRINGE (ML) INJECTION AS NEEDED
Status: DISCONTINUED | OUTPATIENT
Start: 2019-01-01 | End: 2019-01-01 | Stop reason: HOSPADM

## 2019-01-01 RX ORDER — LIDOCAINE HYDROCHLORIDE 10 MG/ML
0.5 INJECTION, SOLUTION EPIDURAL; INFILTRATION; INTRACAUDAL; PERINEURAL ONCE AS NEEDED
Status: COMPLETED | OUTPATIENT
Start: 2019-01-01 | End: 2019-01-01

## 2019-01-01 RX ORDER — BUPIVACAINE HYDROCHLORIDE 2.5 MG/ML
3 INJECTION, SOLUTION INFILTRATION; PERINEURAL
Status: COMPLETED | OUTPATIENT
Start: 2019-01-01 | End: 2019-01-01

## 2019-01-01 RX ORDER — HYDRALAZINE HYDROCHLORIDE 20 MG/ML
5 INJECTION INTRAMUSCULAR; INTRAVENOUS
Status: DISCONTINUED | OUTPATIENT
Start: 2019-01-01 | End: 2019-01-01 | Stop reason: HOSPADM

## 2019-01-01 RX ORDER — FAMOTIDINE 20 MG/1
20 TABLET, FILM COATED ORAL ONCE
Status: COMPLETED | OUTPATIENT
Start: 2019-01-01 | End: 2019-01-01

## 2019-01-01 RX ORDER — NEBIVOLOL 10 MG/1
10 TABLET ORAL DAILY
Status: DISCONTINUED | OUTPATIENT
Start: 2019-01-01 | End: 2019-01-01 | Stop reason: HOSPADM

## 2019-01-01 RX ORDER — INSULIN LISPRO 100 [IU]/ML
INJECTION, SOLUTION INTRAVENOUS; SUBCUTANEOUS
COMMUNITY
Start: 2019-01-01

## 2019-01-01 RX ORDER — LEVOTHYROXINE SODIUM 0.12 MG/1
125 TABLET ORAL
Status: DISCONTINUED | OUTPATIENT
Start: 2019-01-01 | End: 2019-01-01 | Stop reason: HOSPADM

## 2019-01-01 RX ORDER — CEFAZOLIN SODIUM 2 G/100ML
2 INJECTION, SOLUTION INTRAVENOUS EVERY 8 HOURS
Status: COMPLETED | OUTPATIENT
Start: 2019-01-01 | End: 2019-01-01

## 2019-01-01 RX ORDER — PROMETHAZINE HYDROCHLORIDE 25 MG/1
25 TABLET ORAL ONCE AS NEEDED
Status: DISCONTINUED | OUTPATIENT
Start: 2019-01-01 | End: 2019-01-01 | Stop reason: HOSPADM

## 2019-01-01 RX ORDER — ATORVASTATIN CALCIUM 20 MG/1
20 TABLET, FILM COATED ORAL DAILY
Status: DISCONTINUED | OUTPATIENT
Start: 2019-01-01 | End: 2019-01-01 | Stop reason: HOSPADM

## 2019-01-01 RX ORDER — DIBASIC SODIUM PHOSPHATE, MONOBASIC POTASSIUM PHOSPHATE AND MONOBASIC SODIUM PHOSPHATE 852; 155; 130 MG/1; MG/1; MG/1
TABLET ORAL
COMMUNITY
Start: 2019-01-01

## 2019-01-01 RX ORDER — PROMETHAZINE HYDROCHLORIDE 25 MG/ML
6.25 INJECTION, SOLUTION INTRAMUSCULAR; INTRAVENOUS ONCE AS NEEDED
Status: DISCONTINUED | OUTPATIENT
Start: 2019-01-01 | End: 2019-01-01 | Stop reason: HOSPADM

## 2019-01-01 RX ORDER — NIFEDIPINE 30 MG/1
TABLET, EXTENDED RELEASE ORAL
COMMUNITY
Start: 2019-01-01

## 2019-01-01 RX ORDER — SODIUM CHLORIDE, SODIUM LACTATE, POTASSIUM CHLORIDE, CALCIUM CHLORIDE 600; 310; 30; 20 MG/100ML; MG/100ML; MG/100ML; MG/100ML
9 INJECTION, SOLUTION INTRAVENOUS CONTINUOUS PRN
Status: CANCELLED | OUTPATIENT
Start: 2019-01-01

## 2019-01-01 RX ORDER — CALCIUM ACETATE 667 MG/1
667 CAPSULE ORAL 3 TIMES DAILY
Qty: 180 CAPSULE
Start: 2019-01-01 | End: 2019-01-01 | Stop reason: ALTCHOICE

## 2019-01-01 RX ORDER — ALLOPURINOL 100 MG/1
100 TABLET ORAL DAILY
Qty: 90 TABLET | Refills: 3 | Status: SHIPPED | OUTPATIENT
Start: 2019-01-01

## 2019-01-01 RX ORDER — RANITIDINE 150 MG/1
TABLET ORAL
COMMUNITY
Start: 2019-01-01

## 2019-01-01 RX ORDER — ALLOPURINOL 100 MG/1
100 TABLET ORAL DAILY
Status: DISCONTINUED | OUTPATIENT
Start: 2019-01-01 | End: 2019-01-01 | Stop reason: HOSPADM

## 2019-01-01 RX ORDER — BUPROPION HYDROCHLORIDE 100 MG/1
TABLET ORAL
Qty: 270 TABLET | Refills: 2 | Status: SHIPPED | OUTPATIENT
Start: 2019-01-01 | End: 2019-01-01 | Stop reason: SDUPTHER

## 2019-01-01 RX ORDER — PREDNISONE 1 MG/1
TABLET ORAL
COMMUNITY
Start: 2019-01-01

## 2019-01-01 RX ORDER — DOXAZOSIN 2 MG/1
2 TABLET ORAL 2 TIMES DAILY
Qty: 180 TABLET | Refills: 3 | Status: SHIPPED | OUTPATIENT
Start: 2019-01-01 | End: 2019-01-01 | Stop reason: ALTCHOICE

## 2019-01-01 RX ORDER — SODIUM CHLORIDE 0.9 % (FLUSH) 0.9 %
3 SYRINGE (ML) INJECTION EVERY 12 HOURS SCHEDULED
Status: DISCONTINUED | OUTPATIENT
Start: 2019-01-01 | End: 2019-01-01 | Stop reason: HOSPADM

## 2019-01-01 RX ORDER — ATORVASTATIN CALCIUM 20 MG/1
20 TABLET, FILM COATED ORAL DAILY
Qty: 90 TABLET | Refills: 0 | Status: SHIPPED | OUTPATIENT
Start: 2019-01-01 | End: 2019-01-01 | Stop reason: SDUPTHER

## 2019-01-01 RX ORDER — BUMETANIDE 0.25 MG/ML
2 INJECTION INTRAMUSCULAR; INTRAVENOUS ONCE
Status: COMPLETED | OUTPATIENT
Start: 2019-01-01 | End: 2019-01-01

## 2019-01-01 RX ORDER — NEBIVOLOL 10 MG/1
10 TABLET ORAL DAILY
Qty: 90 TABLET | Refills: 2 | Status: SHIPPED | OUTPATIENT
Start: 2019-01-01

## 2019-01-01 RX ORDER — FUROSEMIDE 40 MG/1
40 TABLET ORAL EVERY MORNING
COMMUNITY
Start: 2019-01-01 | End: 2019-01-01 | Stop reason: ALTCHOICE

## 2019-01-01 RX ORDER — DAPSONE 100 MG/1
TABLET ORAL
COMMUNITY
Start: 2019-01-01

## 2019-01-01 RX ORDER — DEXAMETHASONE SODIUM PHOSPHATE 10 MG/ML
INJECTION, SOLUTION INTRAMUSCULAR; INTRAVENOUS
Status: COMPLETED | OUTPATIENT
Start: 2019-01-01 | End: 2019-01-01

## 2019-01-01 RX ORDER — BUPROPION HYDROCHLORIDE 100 MG/1
TABLET ORAL
Qty: 270 TABLET | Refills: 2 | Status: SHIPPED | OUTPATIENT
Start: 2019-01-01

## 2019-01-01 RX ORDER — AMLODIPINE BESYLATE 10 MG/1
10 TABLET ORAL DAILY
Start: 2019-01-01

## 2019-01-01 RX ORDER — SODIUM CHLORIDE 0.9 % (FLUSH) 0.9 %
1-10 SYRINGE (ML) INJECTION AS NEEDED
Status: CANCELLED | OUTPATIENT
Start: 2019-01-01

## 2019-01-01 RX ORDER — BLOOD-GLUCOSE METER
KIT MISCELLANEOUS
COMMUNITY
Start: 2019-01-01

## 2019-01-01 RX ORDER — BUPROPION HYDROCHLORIDE 100 MG/1
100 TABLET ORAL 3 TIMES DAILY
Status: DISCONTINUED | OUTPATIENT
Start: 2019-01-01 | End: 2019-01-01 | Stop reason: HOSPADM

## 2019-01-01 RX ORDER — INSULIN GLARGINE 100 [IU]/ML
INJECTION, SOLUTION SUBCUTANEOUS
COMMUNITY
Start: 2019-01-01

## 2019-01-01 RX ORDER — FUROSEMIDE 40 MG/1
40 TABLET ORAL DAILY
Status: DISCONTINUED | OUTPATIENT
Start: 2019-01-01 | End: 2019-01-01 | Stop reason: HOSPADM

## 2019-01-01 RX ORDER — VENLAFAXINE 75 MG/1
37.5 TABLET ORAL 2 TIMES DAILY
Qty: 45 TABLET | Refills: 3 | Status: SHIPPED | OUTPATIENT
Start: 2019-01-01

## 2019-01-01 RX ORDER — NALOXONE HCL 0.4 MG/ML
0.4 VIAL (ML) INJECTION AS NEEDED
Status: DISCONTINUED | OUTPATIENT
Start: 2019-01-01 | End: 2019-01-01 | Stop reason: HOSPADM

## 2019-01-01 RX ORDER — IPRATROPIUM BROMIDE AND ALBUTEROL SULFATE 2.5; .5 MG/3ML; MG/3ML
3 SOLUTION RESPIRATORY (INHALATION) ONCE AS NEEDED
Status: DISCONTINUED | OUTPATIENT
Start: 2019-01-01 | End: 2019-01-01 | Stop reason: HOSPADM

## 2019-01-01 RX ORDER — SODIUM CHLORIDE, SODIUM LACTATE, POTASSIUM CHLORIDE, CALCIUM CHLORIDE 600; 310; 30; 20 MG/100ML; MG/100ML; MG/100ML; MG/100ML
100 INJECTION, SOLUTION INTRAVENOUS CONTINUOUS
Status: DISCONTINUED | OUTPATIENT
Start: 2019-01-01 | End: 2019-01-01 | Stop reason: HOSPADM

## 2019-01-01 RX ORDER — HEPARIN SODIUM 1000 [USP'U]/ML
1900 INJECTION, SOLUTION INTRAVENOUS; SUBCUTANEOUS AS NEEDED
Status: DISCONTINUED | OUTPATIENT
Start: 2019-01-01 | End: 2019-01-01 | Stop reason: HOSPADM

## 2019-01-01 RX ORDER — POTASSIUM CHLORIDE 1500 MG/1
TABLET, FILM COATED, EXTENDED RELEASE ORAL
Status: ON HOLD | COMMUNITY
Start: 2019-01-01 | End: 2019-01-01 | Stop reason: SDUPTHER

## 2019-01-01 RX ORDER — MYCOPHENOLATE MOFETIL 250 MG/1
500 CAPSULE ORAL 2 TIMES DAILY
COMMUNITY

## 2019-01-01 RX ORDER — AMLODIPINE BESYLATE 5 MG/1
5 TABLET ORAL DAILY
Qty: 90 TABLET | Refills: 1 | Status: SHIPPED | OUTPATIENT
Start: 2019-01-01

## 2019-01-01 RX ORDER — CALCIUM ACETATE 667 MG/1
667 CAPSULE ORAL
Status: DISCONTINUED | OUTPATIENT
Start: 2019-01-01 | End: 2019-01-01 | Stop reason: HOSPADM

## 2019-01-01 RX ORDER — SODIUM CHLORIDE 0.9 % (FLUSH) 0.9 %
3-10 SYRINGE (ML) INJECTION AS NEEDED
Status: DISCONTINUED | OUTPATIENT
Start: 2019-01-01 | End: 2019-01-01 | Stop reason: HOSPADM

## 2019-01-01 RX ORDER — PROMETHAZINE HYDROCHLORIDE 25 MG/1
25 SUPPOSITORY RECTAL ONCE AS NEEDED
Status: DISCONTINUED | OUTPATIENT
Start: 2019-01-01 | End: 2019-01-01 | Stop reason: HOSPADM

## 2019-01-01 RX ORDER — VALGANCICLOVIR 450 MG/1
TABLET, FILM COATED ORAL
COMMUNITY
Start: 2019-01-01

## 2019-01-01 RX ORDER — ATORVASTATIN CALCIUM 20 MG/1
20 TABLET, FILM COATED ORAL DAILY
Qty: 90 TABLET | Refills: 0 | Status: CANCELLED | OUTPATIENT
Start: 2019-01-01 | End: 2020-06-20

## 2019-01-01 RX ORDER — CYCLOBENZAPRINE HCL 10 MG
10 TABLET ORAL 3 TIMES DAILY PRN
Qty: 30 TABLET | Refills: 1 | Status: SHIPPED | OUTPATIENT
Start: 2019-01-01 | End: 2019-01-01

## 2019-01-01 RX ORDER — SODIUM CHLORIDE 0.9 % (FLUSH) 0.9 %
3 SYRINGE (ML) INJECTION EVERY 12 HOURS SCHEDULED
Status: CANCELLED | OUTPATIENT
Start: 2019-01-01

## 2019-01-01 RX ORDER — GABAPENTIN 800 MG/1
400 TABLET ORAL DAILY
Start: 2019-01-01

## 2019-01-01 RX ORDER — DEXAMETHASONE SODIUM PHOSPHATE 4 MG/ML
INJECTION, SOLUTION INTRA-ARTICULAR; INTRALESIONAL; INTRAMUSCULAR; INTRAVENOUS; SOFT TISSUE AS NEEDED
Status: DISCONTINUED | OUTPATIENT
Start: 2019-01-01 | End: 2019-01-01 | Stop reason: SURG

## 2019-01-01 RX ORDER — ATORVASTATIN CALCIUM 20 MG/1
20 TABLET, FILM COATED ORAL DAILY
Qty: 90 TABLET | Refills: 1 | Status: SHIPPED | OUTPATIENT
Start: 2019-01-01 | End: 2019-01-01 | Stop reason: SDUPTHER

## 2019-01-01 RX ORDER — GABAPENTIN 400 MG/1
400 CAPSULE ORAL DAILY
Status: DISCONTINUED | OUTPATIENT
Start: 2019-01-01 | End: 2019-01-01 | Stop reason: HOSPADM

## 2019-01-01 RX ORDER — METHYLPREDNISOLONE 4 MG/1
TABLET ORAL
Qty: 1 EACH | Refills: 0 | Status: SHIPPED | OUTPATIENT
Start: 2019-01-01 | End: 2019-01-01

## 2019-01-01 RX ORDER — MAGNESIUM HYDROXIDE 1200 MG/15ML
LIQUID ORAL AS NEEDED
Status: DISCONTINUED | OUTPATIENT
Start: 2019-01-01 | End: 2019-01-01 | Stop reason: HOSPADM

## 2019-01-01 RX ORDER — LEVOTHYROXINE SODIUM 137 UG/1
137 TABLET ORAL DAILY
Qty: 90 TABLET | Refills: 1 | Status: SHIPPED | OUTPATIENT
Start: 2019-01-01

## 2019-01-01 RX ORDER — LIDOCAINE HYDROCHLORIDE 10 MG/ML
INJECTION, SOLUTION EPIDURAL; INFILTRATION; INTRACAUDAL; PERINEURAL AS NEEDED
Status: DISCONTINUED | OUTPATIENT
Start: 2019-01-01 | End: 2019-01-01 | Stop reason: SURG

## 2019-01-01 RX ORDER — TRAMADOL HYDROCHLORIDE 50 MG/1
50 TABLET ORAL 3 TIMES DAILY PRN
Qty: 40 TABLET | Refills: 1 | Status: SHIPPED | OUTPATIENT
Start: 2019-01-01 | End: 2019-01-01

## 2019-01-01 RX ORDER — BUPIVACAINE HYDROCHLORIDE AND EPINEPHRINE 5; 5 MG/ML; UG/ML
INJECTION, SOLUTION EPIDURAL; INTRACAUDAL; PERINEURAL AS NEEDED
Status: DISCONTINUED | OUTPATIENT
Start: 2019-01-01 | End: 2019-01-01 | Stop reason: HOSPADM

## 2019-01-01 RX ORDER — POTASSIUM CHLORIDE 1500 MG/1
20 TABLET, FILM COATED, EXTENDED RELEASE ORAL DAILY
Qty: 60 TABLET
Start: 2019-01-01 | End: 2019-01-01 | Stop reason: ALTCHOICE

## 2019-01-01 RX ADMIN — Medication 50 ML/HR: at 21:11

## 2019-01-01 RX ADMIN — NEBIVOLOL HYDROCHLORIDE 10 MG: 10 TABLET ORAL at 08:15

## 2019-01-01 RX ADMIN — CALCIUM ACETATE 667 MG: 667 CAPSULE ORAL at 08:48

## 2019-01-01 RX ADMIN — ALLOPURINOL 100 MG: 100 TABLET ORAL at 08:48

## 2019-01-01 RX ADMIN — CALCIUM ACETATE 667 MG: 667 CAPSULE ORAL at 07:56

## 2019-01-01 RX ADMIN — HYDROCODONE BITARTRATE AND ACETAMINOPHEN 1 TABLET: 5; 325 TABLET ORAL at 17:12

## 2019-01-01 RX ADMIN — CALCIUM ACETATE 667 MG: 667 CAPSULE ORAL at 18:33

## 2019-01-01 RX ADMIN — FUROSEMIDE 40 MG: 40 TABLET ORAL at 08:58

## 2019-01-01 RX ADMIN — HEPARIN SODIUM 1900 UNITS: 1000 INJECTION, SOLUTION INTRAVENOUS; SUBCUTANEOUS at 08:15

## 2019-01-01 RX ADMIN — SODIUM CHLORIDE, PRESERVATIVE FREE 3 ML: 5 INJECTION INTRAVENOUS at 21:24

## 2019-01-01 RX ADMIN — BUMETANIDE 2 MG: 0.25 INJECTION INTRAMUSCULAR; INTRAVENOUS at 00:10

## 2019-01-01 RX ADMIN — VENLAFAXINE 37.5 MG: 37.5 TABLET ORAL at 10:38

## 2019-01-01 RX ADMIN — FENTANYL CITRATE 50 MCG: 50 INJECTION, SOLUTION INTRAMUSCULAR; INTRAVENOUS at 10:16

## 2019-01-01 RX ADMIN — VENLAFAXINE 37.5 MG: 37.5 TABLET ORAL at 08:16

## 2019-01-01 RX ADMIN — SODIUM CHLORIDE, PRESERVATIVE FREE 3 ML: 5 INJECTION INTRAVENOUS at 00:11

## 2019-01-01 RX ADMIN — CALCITRIOL 0.25 MCG: 0.25 CAPSULE ORAL at 12:17

## 2019-01-01 RX ADMIN — CALCIUM ACETATE 667 MG: 667 CAPSULE ORAL at 08:58

## 2019-01-01 RX ADMIN — GABAPENTIN 400 MG: 400 CAPSULE ORAL at 11:11

## 2019-01-01 RX ADMIN — HEPARIN SODIUM 1900 UNITS: 1000 INJECTION, SOLUTION INTRAVENOUS; SUBCUTANEOUS at 18:05

## 2019-01-01 RX ADMIN — FUROSEMIDE 40 MG: 40 TABLET ORAL at 10:39

## 2019-01-01 RX ADMIN — GABAPENTIN 400 MG: 400 CAPSULE ORAL at 08:58

## 2019-01-01 RX ADMIN — ERYTHROPOIETIN 10000 UNITS: 10000 INJECTION, SOLUTION INTRAVENOUS; SUBCUTANEOUS at 08:19

## 2019-01-01 RX ADMIN — CALCITRIOL 0.25 MCG: 0.25 CAPSULE ORAL at 08:58

## 2019-01-01 RX ADMIN — SODIUM CHLORIDE, PRESERVATIVE FREE 3 ML: 5 INJECTION INTRAVENOUS at 21:25

## 2019-01-01 RX ADMIN — BUPROPION HYDROCHLORIDE 100 MG: 100 TABLET, FILM COATED ORAL at 08:58

## 2019-01-01 RX ADMIN — AMLODIPINE BESYLATE 10 MG: 10 TABLET ORAL at 08:58

## 2019-01-01 RX ADMIN — AMLODIPINE BESYLATE 10 MG: 10 TABLET ORAL at 08:48

## 2019-01-01 RX ADMIN — CEFAZOLIN SODIUM 2 G: 2 INJECTION, SOLUTION INTRAVENOUS at 14:57

## 2019-01-01 RX ADMIN — BUPRENORPHINE HYDROCHLORIDE 0.15 MG: 0.32 INJECTION INTRAMUSCULAR; INTRAVENOUS at 09:45

## 2019-01-01 RX ADMIN — ATORVASTATIN CALCIUM 20 MG: 20 TABLET, FILM COATED ORAL at 08:58

## 2019-01-01 RX ADMIN — LIDOCAINE HYDROCHLORIDE 3 ML: 10 INJECTION, SOLUTION EPIDURAL; INFILTRATION; INTRACAUDAL; PERINEURAL at 10:17

## 2019-01-01 RX ADMIN — TERAZOSIN HYDROCHLORIDE 2 MG: 2 CAPSULE ORAL at 20:00

## 2019-01-01 RX ADMIN — ALLOPURINOL 100 MG: 100 TABLET ORAL at 10:39

## 2019-01-01 RX ADMIN — CALCIUM ACETATE 667 MG: 667 CAPSULE ORAL at 11:11

## 2019-01-01 RX ADMIN — ERYTHROPOIETIN 20000 UNITS: 20000 INJECTION, SOLUTION INTRAVENOUS; SUBCUTANEOUS at 10:50

## 2019-01-01 RX ADMIN — FUROSEMIDE 40 MG: 40 TABLET ORAL at 08:16

## 2019-01-01 RX ADMIN — CALCIUM ACETATE 667 MG: 667 CAPSULE ORAL at 13:57

## 2019-01-01 RX ADMIN — FAMOTIDINE 20 MG: 20 TABLET, FILM COATED ORAL at 13:57

## 2019-01-01 RX ADMIN — PROPOFOL 100 MCG/KG/MIN: 10 INJECTION, EMULSION INTRAVENOUS at 14:54

## 2019-01-01 RX ADMIN — EPOETIN ALFA 20000 UNITS: 20000 SOLUTION INTRAVENOUS; SUBCUTANEOUS at 10:16

## 2019-01-01 RX ADMIN — CYANOCOBALAMIN 1000 MCG: 1000 INJECTION, SOLUTION INTRAMUSCULAR; SUBCUTANEOUS at 14:47

## 2019-01-01 RX ADMIN — BUPROPION HYDROCHLORIDE 100 MG: 100 TABLET, FILM COATED ORAL at 21:24

## 2019-01-01 RX ADMIN — FAMOTIDINE 20 MG: 20 TABLET ORAL at 09:07

## 2019-01-01 RX ADMIN — BUPIVACAINE HYDROCHLORIDE 30 ML: 2.5 INJECTION, SOLUTION EPIDURAL; INFILTRATION; INTRACAUDAL; PERINEURAL at 09:45

## 2019-01-01 RX ADMIN — LIDOCAINE HYDROCHLORIDE 0.2 ML: 10 INJECTION, SOLUTION EPIDURAL; INFILTRATION; INTRACAUDAL; PERINEURAL at 08:55

## 2019-01-01 RX ADMIN — PROPOFOL 200 MG: 10 INJECTION, EMULSION INTRAVENOUS at 09:31

## 2019-01-01 RX ADMIN — GABAPENTIN 400 MG: 400 CAPSULE ORAL at 08:15

## 2019-01-01 RX ADMIN — CYANOCOBALAMIN 1000 MCG: 1000 INJECTION, SOLUTION INTRAMUSCULAR; SUBCUTANEOUS at 15:18

## 2019-01-01 RX ADMIN — CALCITRIOL 0.25 MCG: 0.25 CAPSULE ORAL at 08:15

## 2019-01-01 RX ADMIN — VENLAFAXINE 37.5 MG: 37.5 TABLET ORAL at 08:58

## 2019-01-01 RX ADMIN — TERAZOSIN HYDROCHLORIDE 2 MG: 2 CAPSULE ORAL at 21:23

## 2019-01-01 RX ADMIN — BUPROPION HYDROCHLORIDE 100 MG: 100 TABLET, FILM COATED ORAL at 20:00

## 2019-01-01 RX ADMIN — CALCIUM ACETATE 667 MG: 667 CAPSULE ORAL at 12:21

## 2019-01-01 RX ADMIN — ATORVASTATIN CALCIUM 20 MG: 20 TABLET, FILM COATED ORAL at 08:16

## 2019-01-01 RX ADMIN — BUPROPION HYDROCHLORIDE 100 MG: 100 TABLET, FILM COATED ORAL at 00:11

## 2019-01-01 RX ADMIN — ERYTHROPOIETIN 20000 UNITS: 20000 INJECTION, SOLUTION INTRAVENOUS; SUBCUTANEOUS at 10:25

## 2019-01-01 RX ADMIN — FENTANYL CITRATE 50 MCG: 50 INJECTION, SOLUTION INTRAMUSCULAR; INTRAVENOUS at 09:31

## 2019-01-01 RX ADMIN — SODIUM CHLORIDE 9 ML/HR: 9 INJECTION, SOLUTION INTRAVENOUS at 08:55

## 2019-01-01 RX ADMIN — ATORVASTATIN CALCIUM 20 MG: 20 TABLET, FILM COATED ORAL at 08:48

## 2019-01-01 RX ADMIN — DEXAMETHASONE SODIUM PHOSPHATE 8 MG: 4 INJECTION, SOLUTION INTRAMUSCULAR; INTRAVENOUS at 09:36

## 2019-01-01 RX ADMIN — SODIUM CHLORIDE: 9 INJECTION, SOLUTION INTRAVENOUS at 09:27

## 2019-01-01 RX ADMIN — GABAPENTIN 400 MG: 400 CAPSULE ORAL at 08:48

## 2019-01-01 RX ADMIN — BUPROPION HYDROCHLORIDE 100 MG: 100 TABLET, FILM COATED ORAL at 10:40

## 2019-01-01 RX ADMIN — LEVOTHYROXINE SODIUM 125 MCG: 125 TABLET ORAL at 10:39

## 2019-01-01 RX ADMIN — BUPROPION HYDROCHLORIDE 100 MG: 100 TABLET, FILM COATED ORAL at 17:42

## 2019-01-01 RX ADMIN — CALCIUM ACETATE 667 MG: 667 CAPSULE ORAL at 08:15

## 2019-01-01 RX ADMIN — BUPROPION HYDROCHLORIDE 100 MG: 100 TABLET, FILM COATED ORAL at 10:18

## 2019-01-01 RX ADMIN — VENLAFAXINE 37.5 MG: 37.5 TABLET ORAL at 00:11

## 2019-01-01 RX ADMIN — CYANOCOBALAMIN 1000 MCG: 1000 INJECTION, SOLUTION INTRAMUSCULAR; SUBCUTANEOUS at 16:08

## 2019-01-01 RX ADMIN — CALCIUM ACETATE 667 MG: 667 CAPSULE ORAL at 17:42

## 2019-01-01 RX ADMIN — LEVOTHYROXINE SODIUM 125 MCG: 125 TABLET ORAL at 05:06

## 2019-01-01 RX ADMIN — TERAZOSIN HYDROCHLORIDE 2 MG: 2 CAPSULE ORAL at 21:24

## 2019-01-01 RX ADMIN — ALLOPURINOL 100 MG: 100 TABLET ORAL at 08:58

## 2019-01-01 RX ADMIN — LIDOCAINE HYDROCHLORIDE 50 MG: 10 INJECTION, SOLUTION EPIDURAL; INFILTRATION; INTRACAUDAL; PERINEURAL at 09:31

## 2019-01-01 RX ADMIN — NEBIVOLOL HYDROCHLORIDE 10 MG: 10 TABLET ORAL at 08:48

## 2019-01-01 RX ADMIN — FENTANYL CITRATE 50 MCG: 50 INJECTION, SOLUTION INTRAMUSCULAR; INTRAVENOUS at 11:14

## 2019-01-01 RX ADMIN — AMLODIPINE BESYLATE 10 MG: 10 TABLET ORAL at 10:40

## 2019-01-01 RX ADMIN — FUROSEMIDE 40 MG: 40 TABLET ORAL at 08:48

## 2019-01-01 RX ADMIN — SODIUM CHLORIDE, PRESERVATIVE FREE 3 ML: 5 INJECTION INTRAVENOUS at 08:17

## 2019-01-01 RX ADMIN — CALCIUM ACETATE 667 MG: 667 CAPSULE ORAL at 17:12

## 2019-01-01 RX ADMIN — NEBIVOLOL HYDROCHLORIDE 10 MG: 10 TABLET ORAL at 08:58

## 2019-01-01 RX ADMIN — TRIAMCINOLONE ACETONIDE 80 MG: 40 INJECTION, SUSPENSION INTRA-ARTICULAR; INTRAMUSCULAR at 09:11

## 2019-01-01 RX ADMIN — DEXAMETHASONE SODIUM PHOSPHATE 2 MG: 10 INJECTION INTRAMUSCULAR; INTRAVENOUS at 09:45

## 2019-01-01 RX ADMIN — LEVOTHYROXINE SODIUM 125 MCG: 125 TABLET ORAL at 05:12

## 2019-01-01 RX ADMIN — CALCITRIOL 0.25 MCG: 0.25 CAPSULE ORAL at 10:38

## 2019-01-01 RX ADMIN — SODIUM CHLORIDE, PRESERVATIVE FREE 3 ML: 5 INJECTION INTRAVENOUS at 20:00

## 2019-01-01 RX ADMIN — NEBIVOLOL HYDROCHLORIDE 10 MG: 10 TABLET ORAL at 10:39

## 2019-01-01 RX ADMIN — BUPIVACAINE HYDROCHLORIDE 3 ML: 2.5 INJECTION, SOLUTION INFILTRATION; PERINEURAL at 09:11

## 2019-01-01 RX ADMIN — HEPARIN SODIUM 1900 UNITS: 1000 INJECTION, SOLUTION INTRAVENOUS; SUBCUTANEOUS at 07:56

## 2019-01-01 RX ADMIN — VENLAFAXINE 37.5 MG: 37.5 TABLET ORAL at 19:59

## 2019-01-01 RX ADMIN — TRIAMCINOLONE ACETONIDE 80 MG: 40 INJECTION, SUSPENSION INTRA-ARTICULAR; INTRAMUSCULAR at 10:17

## 2019-01-01 RX ADMIN — ERYTHROPOIETIN 20000 UNITS: 20000 INJECTION, SOLUTION INTRAVENOUS; SUBCUTANEOUS at 15:50

## 2019-01-01 RX ADMIN — FENTANYL CITRATE 50 MCG: 50 INJECTION, SOLUTION INTRAMUSCULAR; INTRAVENOUS at 11:38

## 2019-01-01 RX ADMIN — BUPROPION HYDROCHLORIDE 100 MG: 100 TABLET, FILM COATED ORAL at 08:48

## 2019-01-01 RX ADMIN — VENLAFAXINE 37.5 MG: 37.5 TABLET ORAL at 08:48

## 2019-01-01 RX ADMIN — CEFAZOLIN SODIUM 2 G: 2 INJECTION, SOLUTION INTRAVENOUS at 09:28

## 2019-01-01 RX ADMIN — ATORVASTATIN CALCIUM 20 MG: 20 TABLET, FILM COATED ORAL at 10:39

## 2019-01-01 RX ADMIN — ERYTHROPOIETIN 20000 UNITS: 20000 INJECTION, SOLUTION INTRAVENOUS; SUBCUTANEOUS at 15:09

## 2019-01-01 RX ADMIN — HEPARIN SODIUM 1900 UNITS: 1000 INJECTION, SOLUTION INTRAVENOUS; SUBCUTANEOUS at 08:16

## 2019-01-01 RX ADMIN — CYANOCOBALAMIN 1000 MCG: 1000 INJECTION, SOLUTION INTRAMUSCULAR; SUBCUTANEOUS at 15:29

## 2019-01-01 RX ADMIN — TERAZOSIN HYDROCHLORIDE 2 MG: 2 CAPSULE ORAL at 00:10

## 2019-01-01 RX ADMIN — CYANOCOBALAMIN 1000 MCG: 1000 INJECTION, SOLUTION INTRAMUSCULAR; SUBCUTANEOUS at 14:38

## 2019-01-01 RX ADMIN — LEVOTHYROXINE SODIUM 125 MCG: 125 TABLET ORAL at 06:02

## 2019-01-01 RX ADMIN — VENLAFAXINE 37.5 MG: 37.5 TABLET ORAL at 21:24

## 2019-01-01 RX ADMIN — AMLODIPINE BESYLATE 10 MG: 10 TABLET ORAL at 08:16

## 2019-01-01 RX ADMIN — ERYTHROPOIETIN 10000 UNITS: 10000 INJECTION, SOLUTION INTRAVENOUS; SUBCUTANEOUS at 18:04

## 2019-01-01 RX ADMIN — CYANOCOBALAMIN 1000 MCG: 1000 INJECTION, SOLUTION INTRAMUSCULAR; SUBCUTANEOUS at 14:12

## 2019-01-01 RX ADMIN — ALLOPURINOL 100 MG: 100 TABLET ORAL at 08:16

## 2019-01-15 NOTE — PROGRESS NOTES
Subjective   Kevin Aguero is a 63 y.o. male.     History of Present Illness   For the past week has had some increased lower back pain.  Hurts in midline lower to left side.  Not into left leg.  Has had before but has gotten worse.  Is hard to get up.  Cannot take NSAIDs because of kidneys.  No real numbness or weakness.      The following portions of the patient's history were reviewed and updated as appropriate: allergies, current medications, past medical history and problem list.    Review of Systems   Constitutional: Negative for fatigue and fever.   Respiratory: Negative.    Cardiovascular: Negative.    Musculoskeletal: Positive for back pain.       Objective   Physical Exam   Constitutional: He appears well-developed and well-nourished.   Musculoskeletal:   Hurts to lay flat but no SLR pain.  Hips move well.  Knee jerks are normal.   Neurological: He is alert. He displays normal reflexes. No sensory deficit. He exhibits normal muscle tone.   Nursing note and vitals reviewed.        Assessment/Plan   Kevin was seen today for back pain.    Diagnoses and all orders for this visit:    Acute bilateral low back pain, with sciatica presence unspecified    Reactive depression  -     venlafaxine (EFFEXOR) 75 MG tablet; Take 0.5 tablets by mouth 2 (Two) Times a Day.    Other orders  -     MethylPREDNISolone (MEDROL, BEBETO,) 4 MG tablet; Take as directed on package instructions.  -     cyclobenzaprine (FLEXERIL) 10 MG tablet; Take 1 tablet by mouth 3 (Three) Times a Day As Needed for Muscle Spasms.  -     gabapentin (NEURONTIN) 800 MG tablet; Take 0.5 tablets by mouth Daily. For: Gout    Most likely this is muscular back pain.  Will try above.  He will call if not better.  25 minutes spent in evaluation and discussion.

## 2019-02-26 PROBLEM — N17.9 ACUTE ON CHRONIC RENAL FAILURE (HCC): Status: ACTIVE | Noted: 2019-01-01

## 2019-02-26 PROBLEM — R05.9 COUGH: Status: RESOLVED | Noted: 2017-11-13 | Resolved: 2019-01-01

## 2019-02-26 PROBLEM — N18.9 ACUTE ON CHRONIC RENAL FAILURE (HCC): Status: ACTIVE | Noted: 2019-01-01

## 2019-02-26 PROBLEM — E87.20 METABOLIC ACIDOSIS: Status: ACTIVE | Noted: 2019-01-01

## 2019-02-26 PROBLEM — N18.5 ACUTE RENAL FAILURE SUPERIMPOSED ON STAGE 5 CHRONIC KIDNEY DISEASE, NOT ON CHRONIC DIALYSIS (HCC): Status: ACTIVE | Noted: 2019-01-01

## 2019-02-26 PROBLEM — D72.829 LEUKOCYTOSIS: Status: ACTIVE | Noted: 2019-01-01

## 2019-02-26 NOTE — PROGRESS NOTES
Subjective   Kevin Aguero is a 63 y.o. male.     History of Present Illness   He comes in today because increased balance problems, weakness, and increase edema in his abdomen, scrotum and legs.  He has end stage renal disease and his last creatinine was 6.78.  He is trying to wait for a kidney transplant from his wife.  No bad nausea or vomiting.  He denies chest pain or sob.      The following portions of the patient's history were reviewed and updated as appropriate: allergies, current medications, past medical history and problem list.    Review of Systems   Constitutional: Positive for fatigue. Negative for fever.   Eyes: Negative.    Respiratory: Positive for shortness of breath (minimal on exertion.).    Cardiovascular: Positive for leg swelling. Negative for chest pain and palpitations.   Gastrointestinal: Negative.  Negative for abdominal distention and abdominal pain.   Genitourinary: Negative.    Neurological: Positive for dizziness.       Objective   Physical Exam   Constitutional: He appears well-developed and well-nourished.   Smells of kidney failure.   Cardiovascular: Normal rate, regular rhythm and normal heart sounds. Exam reveals no gallop and no friction rub.   No murmur heard.  Pulmonary/Chest: Effort normal and breath sounds normal. No stridor. No respiratory distress. He has no wheezes. He has no rales.   Some dullness in right base.   Abdominal: Soft. Bowel sounds are normal.   Mild ascites.   Musculoskeletal: He exhibits edema (legs are mildly swollen.).   Skin:   Pale.   Nursing note and vitals reviewed.        Assessment/Plan   Kevin was seen today for edema.    Diagnoses and all orders for this visit:    Type 2 diabetes mellitus without complication, with long-term current use of insulin (CMS/[facility])  Stable right now.    Anemia of chronic renal failure, stage 5 (CMS/HCC)  Stable.    Renal insufficiency  Needs to be treated acutely as noted below.    I believe all his sx are from end stage  renal disease.  He needs to go to hospital and needs diuresis and probably acute dialysis.  He will go to ER this evening.

## 2019-03-01 NOTE — PLAN OF CARE
Problem: Patient Care Overview  Goal: Plan of Care Review  Outcome: Ongoing (interventions implemented as appropriate)      Problem: Kidney Disease, Chronic/End Stage Renal Disease (Adult)  Goal: Signs and Symptoms of Listed Potential Problems Will be Absent, Minimized or Managed (Kidney Disease, Chronic/End Stage Renal Disease)  Outcome: Ongoing (interventions implemented as appropriate)   03/01/19 0129   Goal/Outcome Evaluation   Problems Assessed (Chronic Kidney Disease/ESRD) all   Problems Present (Chronic Kidney/ESRD) functional decline/self-care deficit       Problem: Fall Risk (Adult)  Goal: Identify Related Risk Factors and Signs and Symptoms  Outcome: Ongoing (interventions implemented as appropriate)   03/01/19 0129   Fall Risk (Adult)   Related Risk Factors (Fall Risk) age-related changes;fatigue/slow reaction;gait/mobility problems;history of falls;environment unfamiliar   Signs and Symptoms (Fall Risk) presence of risk factors     Goal: Absence of Fall  Outcome: Ongoing (interventions implemented as appropriate)   03/01/19 0129   Fall Risk (Adult)   Absence of Fall making progress toward outcome       Problem: Skin Injury Risk (Adult)  Goal: Identify Related Risk Factors and Signs and Symptoms  Outcome: Ongoing (interventions implemented as appropriate)   03/01/19 0129   Skin Injury Risk (Adult)   Related Risk Factors (Skin Injury Risk) advanced age;edema;fluid intake inadequate;mobility impaired;tissue perfusion altered     Goal: Skin Health and Integrity  Outcome: Ongoing (interventions implemented as appropriate)   03/01/19 0129   Skin Injury Risk (Adult)   Skin Health and Integrity making progress toward outcome       Problem: Patient Care Overview  Goal: Plan of Care Review  Outcome: Ongoing (interventions implemented as appropriate)   03/01/19 0129   Coping/Psychosocial   Plan of Care Reviewed With patient   Plan of Care Review   Progress improving       Problem: Hemodialysis (Adult)  Goal: Signs  and Symptoms of Listed Potential Problems Will be Absent, Minimized or Managed (Hemodialysis)  Outcome: Ongoing (interventions implemented as appropriate)   03/01/19 0229   Goal/Outcome Evaluation   Problems Assessed (Hemodialysis) all   Problems Present (Hemodialysis) fluid imbalance

## 2019-03-01 NOTE — PROGRESS NOTES
"   LOS: 3 days    Patient Care Team:  Gilson Gates MD as PCP - General    Chief Complaint:      Subjective      Patient seen on dialysis.  No acute events overnight. No new complaints.     Review of Systems:   Patient denies shortness of breath, chest pain, dysuria, hematuria, nausea, vomiting.      Objective     Vital Sign Min/Max for last 24 hours  Temp  Min: 97.6 °F (36.4 °C)  Max: 98.8 °F (37.1 °C)   BP  Min: 136/76  Max: 164/85   Pulse  Min: 58  Max: 73   Resp  Min: 16  Max: 18   SpO2  Min: 96 %  Max: 98 %   No Data Recorded   No Data Recorded     Flowsheet Rows      First Filed Value   Admission Height  182.9 cm (72\") Documented at 02/26/2019 1746   Admission Weight  113 kg (249 lb) Documented at 02/26/2019 1746          No intake/output data recorded.  I/O last 3 completed shifts:  In: 360 [P.O.:360]  Out: 4530 [Urine:2500; Other:2030]    Physical Exam:  General Appearance: Alert, oriented, no obvious distress.  Uremic breath  Eyes: PER, EOMI.  Neck: Supple no JVD.  Lungs: Clear auscultation, no rales rhonchi's, equal chest movement, nonlabored.  Heart: No gallop, murmur, rub, RRR.  Abdomen: Soft, nontender, positive bowel sounds, no organomegaly.  Extremities: Trace edema, no cyanosis.  Neuro: No focal deficit, moving all extremities, alert oriented X 3  Tunnel catheter right IJ          WBC WBC   Date Value Ref Range Status   03/01/2019 9.29 3.50 - 10.80 10*3/mm3 Final   02/27/2019 9.19 3.50 - 10.80 10*3/mm3 Final   02/26/2019 12.67 (H) 3.50 - 10.80 10*3/mm3 Final      HGB Hemoglobin   Date Value Ref Range Status   03/01/2019 9.0 (L) 13.1 - 17.5 g/dL Final   02/27/2019 8.9 (L) 13.1 - 17.5 g/dL Final   02/26/2019 10.0 (L) 13.1 - 17.5 g/dL Final      HCT Hematocrit   Date Value Ref Range Status   03/01/2019 27.3 (L) 38.9 - 50.9 % Final   02/27/2019 28.4 (L) 38.9 - 50.9 % Final   02/26/2019 31.5 (L) 38.9 - 50.9 % Final      Platlets No results found for: LABPLAT   MCV MCV   Date Value Ref Range Status "   03/01/2019 94.5 80.0 - 99.0 fL Final   02/27/2019 96.9 80.0 - 99.0 fL Final   02/26/2019 97.2 80.0 - 99.0 fL Final          Sodium Sodium   Date Value Ref Range Status   03/01/2019 139 132 - 146 mmol/L Final   02/27/2019 142 132 - 146 mmol/L Final   02/26/2019 142 132 - 146 mmol/L Final      Potassium Potassium   Date Value Ref Range Status   03/01/2019 3.3 (L) 3.5 - 5.5 mmol/L Final   02/27/2019 4.8 3.5 - 5.5 mmol/L Final   02/26/2019 5.1 3.5 - 5.5 mmol/L Final      Chloride Chloride   Date Value Ref Range Status   03/01/2019 109 99 - 109 mmol/L Final   02/27/2019 118 (H) 99 - 109 mmol/L Final   02/26/2019 115 (H) 99 - 109 mmol/L Final      CO2 CO2   Date Value Ref Range Status   03/01/2019 16.0 (L) 20.0 - 31.0 mmol/L Final   02/27/2019 10.0 (L) 20.0 - 31.0 mmol/L Final   02/26/2019 <10.0 (C) 20.0 - 31.0 mmol/L Final      BUN BUN   Date Value Ref Range Status   03/01/2019 78 (H) 9 - 23 mg/dL Final   02/27/2019 131 (H) 9 - 23 mg/dL Final   02/26/2019 135 (H) 9 - 23 mg/dL Final      Creatinine Creatinine   Date Value Ref Range Status   03/01/2019 6.00 (H) 0.60 - 1.30 mg/dL Final   02/27/2019 8.50 (H) 0.60 - 1.30 mg/dL Final   02/26/2019 8.47 (H) 0.60 - 1.30 mg/dL Final      Calcium Calcium   Date Value Ref Range Status   03/01/2019 7.0 (L) 8.7 - 10.4 mg/dL Final   02/27/2019 7.1 (L) 8.7 - 10.4 mg/dL Final   02/26/2019 7.8 (L) 8.7 - 10.4 mg/dL Final      PO4 No results found for: CAPO4   Albumin Albumin   Date Value Ref Range Status   02/27/2019 2.73 (L) 3.20 - 4.80 g/dL Final   02/26/2019 3.38 3.20 - 4.80 g/dL Final      Magnesium Magnesium   Date Value Ref Range Status   02/27/2019 1.9 1.3 - 2.7 mg/dL Final      Uric Acid No results found for: URICACID        Results Review:     I reviewed the patient's new clinical results.      allopurinol 100 mg Oral Daily   amLODIPine 10 mg Oral Q24H   atorvastatin 20 mg Oral Daily   buPROPion 100 mg Oral TID   calcitriol 0.25 mcg Oral Daily   calcium acetate 667 mg Oral TID  With Meals   epoetin cristel 10,000 Units Subcutaneous Once per day on Mon Wed Fri   furosemide 40 mg Oral Daily   gabapentin 400 mg Oral Daily   levothyroxine 125 mcg Oral Q AM   nebivolol 10 mg Oral Daily   sodium chloride 3 mL Intravenous Q12H   terazosin 2 mg Oral Nightly   venlafaxine 37.5 mg Oral BID       lactated ringers 100 mL/hr       Medication Review:     Assessment/Plan       Acute renal failure superimposed on stage 5 chronic kidney disease, not on chronic dialysis (CMS/[facility])    Benign essential hypertension    Type 2 diabetes mellitus without complication, with long-term current use of insulin (CMS/[facility])    Diabetic retinopathy (CMS/[facility])    Mixed hyperlipidemia    Acquired hypothyroidism    Sleep apnea    Anemia of chronic renal failure, stage 5 (CMS/[facility])    Metabolic acidosis    Leukocytosis      1- CKD stage V with sign and symptoms of uremia. Now ESRD.  2- Volume overload   3- Anemia of chronic disease: Iron saturation 58%  4- hyperphosphatemia   5- Secondary Hyperparathyroidism.     Plan:  - Patient seen on dialysis. UF as tolerated.   - Renal diet  - Phoslo with meals  - Avoid nephrotoxic agents.   - Continue with Calcitriol.   - Epo with HD.      to start working for outpatient dialysis chair.        Hui Adam MD  03/01/19  9:00 AM

## 2019-03-01 NOTE — PLAN OF CARE
Problem: Patient Care Overview  Goal: Plan of Care Review  Outcome: Ongoing (interventions implemented as appropriate)   03/01/19 6127   OTHER   Outcome Summary Dialysis today, 2L off, pt tired an weak after. Up in chair most of the day with no complaints. Waiting for approval for outpt dialysis until transplant. VS stable, will cont to monitor.      Goal: Individualization and Mutuality  Outcome: Ongoing (interventions implemented as appropriate)    Goal: Discharge Needs Assessment  Outcome: Ongoing (interventions implemented as appropriate)    Goal: Interprofessional Rounds/Family Conf  Outcome: Ongoing (interventions implemented as appropriate)      Problem: Renal Failure/Kidney Injury, Acute (Adult)  Goal: Signs and Symptoms of Listed Potential Problems Will be Absent, Minimized or Managed (Renal Failure/Kidney Injury, Acute)  Outcome: Ongoing (interventions implemented as appropriate)      Problem: Kidney Disease, Chronic/End Stage Renal Disease (Adult)  Goal: Signs and Symptoms of Listed Potential Problems Will be Absent, Minimized or Managed (Kidney Disease, Chronic/End Stage Renal Disease)  Outcome: Ongoing (interventions implemented as appropriate)      Problem: Fall Risk (Adult)  Goal: Identify Related Risk Factors and Signs and Symptoms  Outcome: Ongoing (interventions implemented as appropriate)    Goal: Absence of Fall  Outcome: Ongoing (interventions implemented as appropriate)      Problem: Skin Injury Risk (Adult)  Goal: Identify Related Risk Factors and Signs and Symptoms  Outcome: Ongoing (interventions implemented as appropriate)    Goal: Skin Health and Integrity  Outcome: Ongoing (interventions implemented as appropriate)

## 2019-03-01 NOTE — PROGRESS NOTES
Continued Stay Note  [facility]     Patient Name: Kevin Aguero  MRN: [id number]  Today's Date: 3/1/2019    Admit Date: 2/26/2019    Discharge Plan     Row Name 03/01/19 1559       Plan    Plan  update    Patient/Family in Agreement with Plan  yes    Plan Comments  Called [facility] and spoke to Karrie regarding referral who reports that patient has not yet been approved but they are working on it.  Provided number to  nursing unit to call if approved.  RN notified.  CM following.    Final Discharge Disposition Code  01 - home or self-care    Row Name 03/01/19 1550       Plan    Plan  update    Patient/Family in Agreement with Plan  yes    Plan Comments  Called [facility] and spoke to Vandana who contacted a supervisor who was working on another case and has provided name, title and phone number and will call CM back.  Explained to Vandana that patien twdavid sready for discharge.  CM following.      Final Discharge Disposition Code  01 - home or self-care    Row Name 03/01/19 1439       Plan    Plan  update    Patient/Family in Agreement with Plan  yes    Plan Comments  Called [facility] and spoke to Archana inquring as to whether patient referral has been reviewed, approved and chair time assigned who adivses that Jud Kaba is the assigned  for this referral and took message to have  call back.  CM advised Archana that patient was medically ready to discharge from the hospital and if assigned a chair time today could go home.  CM awaiting callback.      Final Discharge Disposition Code  01 - home or self-care        Discharge Codes    No documentation.       Expected Discharge Date and Time     Expected Discharge Date Expected Discharge Time    Mar 5, 2019             Fay Arreguin, RN

## 2019-03-01 NOTE — PROGRESS NOTES
Continued Stay Note  [facility]     Patient Name: Kevin Aguero  MRN: [id number]  Today's Date: 3/1/2019    Admit Date: 2/26/2019    Discharge Plan     Row Name 03/01/19 1550       Plan    Plan  update    Patient/Family in Agreement with Plan  yes    Plan Comments  Called [facility] and spoke to Vandana who contacted a supervisor who was working on another case and has provided name, title and phone number and will call CM back.  Explained to Vandana that patien twa sready for discharge.  CM following.      Final Discharge Disposition Code  01 - home or self-care    Row Name 03/01/19 1439       Plan    Plan  update    Patient/Family in Agreement with Plan  yes    Plan Comments  Called [facility] and spoke to Archana inquring as to whether patient referral has been reviewed, approved and chair time assigned who adivses that Jud Kaba is the assigned  for this referral and took message to have  call back.  CM advised Archana that patient was medically ready to discharge from the hospital and if assigned a chair time today could go home.  CM awaiting callback.      Final Discharge Disposition Code  01 - home or self-care    Row Name 03/01/19 1303       Plan    Plan  Home    Patient/Family in Agreement with Plan  yes    Plan Comments  Spoke to Linnea with [facility] to confirm what dialysis clinic to make arrangements with who advise that they prefer to use [facility].  [phone] to advise of new patient and that this patient is medically ready for discharge per ARNP.  Faxed clinical information to [phone].  Awaiting callback from [facility] regarding approval and chair time.  Spoke with patient at bedside to advise.  Patient hoping to be discharged today.  No discharge needs noted.  CM following.  Patient plan is to discharge home via car with family to transport when OP dialysis arrangements have been made.      Final Discharge Disposition  Code  01 - home or self-care        Discharge Codes    No documentation.       Expected Discharge Date and Time     Expected Discharge Date Expected Discharge Time    Mar 5, 2019             Fay Arreguin RN

## 2019-03-01 NOTE — PROGRESS NOTES
Continued Stay Note  [facility]     Patient Name: Kevin Aguero  MRN: [id number]  Today's Date: 3/1/2019    Admit Date: 2/26/2019    Discharge Plan     Row Name 03/01/19 1303       Plan    Plan  Home    Patient/Family in Agreement with Plan  yes    Plan Comments  Spoke to Linnea with [facility] to confirm what dialysis clinic to make arrangements with who advise that they prefer to use [facility].  Called [facility] [phone] to advise of new patient and that this patient is medically ready for discharge per ARNP.  Faxed clinical information to [phone].  Awaiting callback from [employer] regarding approval and chair time.  Spoke with patient at bedside to advise.  Patient hoping to be discharged today.  No discharge needs noted.  CM following.  Patient plan is to discharge home via car with family to transport when OP dialysis arrangements have been made.      Final Discharge Disposition Code  01 - home or self-care        Discharge Codes    No documentation.       Expected Discharge Date and Time     Expected Discharge Date Expected Discharge Time    Mar 5, 2019             Fay Arreguin RN

## 2019-03-01 NOTE — PROGRESS NOTES
Continued Stay Note  [facility]     Patient Name: Kevin Aguero  MRN: [id number]  Today's Date: 3/1/2019    Admit Date: 2/26/2019    Discharge Plan     Row Name 03/01/19 1439       Plan    Plan  update    Patient/Family in Agreement with Plan  yes    Plan Comments  Called [facility] and spoke to Archana inquring as to whether patient referral has been reviewed, approved and chair time assigned who adivses that Jud Kaba is the assigned  for this referral and took message to have  call back.  CM advised Archana that patient was medically ready to discharge from the hospital and if assigned a chair time today could go home.  CM awaiting callback.      Final Discharge Disposition Code  01 - home or self-care    Row Name 03/01/19 1303       Plan    Plan  Home    Patient/Family in Agreement with Plan  yes    Plan Comments  Spoke to Linnea with [facility] to confirm what dialysis clinic to make arrangements with who advise that they prefer to use [facility].  [facility] [phone] to advise of new patient and that this patient is medically ready for discharge per ARNP.  Faxed clinical information to [phone].  [facility]ing [facility] regarding [facility] and chair time.  Spoke with patient at bedside to advise.  Patient hoping to be discharged today.  No discharge needs noted.  CM following.  Patient plan is to discharge home via car with family to transport when OP dialysis arrangements have been made.      Final Discharge Disposition Code  01 - home or self-care        Discharge Codes    No documentation.       Expected Discharge Date and Time     Expected Discharge Date Expected Discharge Time    Mar 5, 2019             Fay Arreguin RN

## 2019-03-01 NOTE — PROGRESS NOTES
[facility]  PROGRESS NOTE    Patient Name: Kevin Aguero  : 1955  MRN: [id number]    Date of Admission: 2019  Length of Stay: 3  Primary Care Physician: Gilson Gates MD    Subjective   Subjective     CC:  Hospital follow up for Edema, kidney failure    HPI:  Sitting up in chair this afternoon.  Reports that he had some shortness of air during dialysis and they took him off early.  No other issues.  Hopeful that he can go home this weekend some time.      Review of Systems  Gen- No fevers, chills  CV- No chest pain, palpitations  Resp- No cough, dyspnea  GI- No N/V/D, abd pain    Otherwise ROS is negative except as mentioned in the HPI.    Objective   Objective     Vital Signs:   Temp:  [97.6 °F (36.4 °C)-98.3 °F (36.8 °C)] 98.1 °F (36.7 °C)  Heart Rate:  [58-74] 74  Resp:  [16-18] 18  BP: (133-162)/(74-84) 133/74        Physical Exam:  Constitutional: No acute distress, awake, alert, sitting in chair, no family at bedside.    HENT: NCAT, mucous membranes moist  Respiratory: Clear to auscultation bilaterally, respiratory effort normal on room air  Cardiovascular: RRR, no murmurs, rubs, or gallops, palpable pedal pulses bilaterally  Gastrointestinal: Positive bowel sounds, soft, nontender, nondistended  Musculoskeletal: 1+ pitting edema BLEs  Psychiatric: Appropriate affect, cooperative  Neurologic: Oriented x 3, strength symmetric in all extremities, Cranial Nerves grossly intact to confrontation, speech clear  Skin: No rashes  Results Reviewed:  I have personally reviewed current lab, radiology, and data and agree.    Results from last 7 days   Lab Units 19  0355 19  0802 19  1753   WBC 10*3/mm3 9.29 9.19 12.67*   HEMOGLOBIN g/dL 9.0* 8.9* 10.0*   HEMATOCRIT % 27.3* 28.4* 31.5*   PLATELETS 10*3/mm3 179 190 211   INR   --  1.26*  --      Results from last 7 days   Lab Units 19  0355 19  0802 19  2111 19  1751    SODIUM mmol/L 139 142  --  142   POTASSIUM mmol/L 3.3* 4.8  --  5.1   CHLORIDE mmol/L 109 118*  --  115*   CO2 mmol/L 16.0* 10.0*  --  <10.0*   BUN mg/dL 78* 131*  --  135*   CREATININE mg/dL 6.00* 8.50*  --  8.47*   GLUCOSE mg/dL 136* 123*  --  110*   CALCIUM mg/dL 7.0* 7.1*  --  7.8*   ALT (SGPT) U/L  --   --   --  29   AST (SGOT) U/L  --   --   --  20   TROPONIN I ng/mL  --   --  0.042* 0.033     Estimated Creatinine Clearance: 16.2 mL/min (A) (by C-G formula based on SCr of 6 mg/dL (H)).    BNP   Date Value Ref Range Status   02/26/2019 977.0 (H) 0.0 - 100.0 pg/mL Final     Comment:     Results may be falsely decreased if patient taking Biotin.       Microbiology Results Abnormal     None          Imaging Results (last 24 hours)     ** No results found for the last 24 hours. **               I have reviewed the medications:    Current Facility-Administered Medications:   •  albumin human 25 % IV SOLN 12.5 g, 12.5 g, Intravenous, PRN, Terence Moon MD  •  allopurinol (ZYLOPRIM) tablet 100 mg, 100 mg, Oral, Daily, Della José APRN, 100 mg at 03/01/19 0816  •  amLODIPine (NORVASC) tablet 10 mg, 10 mg, Oral, Q24H, Della José APRN, 10 mg at 03/01/19 0816  •  atorvastatin (LIPITOR) tablet 20 mg, 20 mg, Oral, Daily, Della José APRN, 20 mg at 03/01/19 0816  •  buPROPion (WELLBUTRIN) tablet 100 mg, 100 mg, Oral, TID, Della José APRN, 100 mg at 03/01/19 1018  •  calcitriol (ROCALTROL) capsule 0.25 mcg, 0.25 mcg, Oral, Daily, Della José APRN, 0.25 mcg at 03/01/19 0815  •  calcium acetate (PHOS BINDER)) capsule 667 mg, 667 mg, Oral, TID With Meals, Della José APRN, 667 mg at 03/01/19 0815  •  epoetin cristel (EPOGEN,PROCRIT) injection 10,000 Units, 10,000 Units, Subcutaneous, Once per day on Mon Wed Fri, Reji Saleem MD, 10,000 Units at 03/01/19 0819  •  furosemide (LASIX) tablet 40 mg, 40 mg, Oral, Daily, Della José APRN, 40 mg at 03/01/19 0816  •  gabapentin (NEURONTIN)  capsule 400 mg, 400 mg, Oral, Daily, Della José APRN, 400 mg at 03/01/19 0815  •  heparin (porcine) injection 1,900 Units, 1,900 Units, Intracatheter, PRN, Hui Adam MD, 1,900 Units at 03/01/19 0816  •  HYDROcodone-acetaminophen (NORCO) 5-325 MG per tablet 1 tablet, 1 tablet, Oral, Q6H PRN, Irma Smith MD, 1 tablet at 02/28/19 1712  •  lactated ringers infusion, 100 mL/hr, Intravenous, Continuous, Tyler Hewitt, CRNA  •  levothyroxine (SYNTHROID, LEVOTHROID) tablet 125 mcg, 125 mcg, Oral, Q AM, Della José APRN, 125 mcg at 03/01/19 0512  •  nebivolol (BYSTOLIC) tablet 10 mg, 10 mg, Oral, Daily, Della José APRN, 10 mg at 03/01/19 0815  •  [COMPLETED] Insert peripheral IV, , , Once **AND** sodium chloride 0.9 % flush 10 mL, 10 mL, Intravenous, PRN, Jose G Baig MD  •  sodium chloride 0.9 % flush 3 mL, 3 mL, Intravenous, Q12H, Della José APRN, 3 mL at 03/01/19 0817  •  sodium chloride 0.9 % flush 3-10 mL, 3-10 mL, Intravenous, PRN, Della José APRN  •  terazosin (HYTRIN) capsule 2 mg, 2 mg, Oral, Nightly, Della José APRN, 2 mg at 02/28/19 2123  •  venlafaxine (EFFEXOR) tablet 37.5 mg, 37.5 mg, Oral, BID, Della José APRN, 37.5 mg at 03/01/19 0816      Assessment/Plan   Assessment / Plan       Acute renal failure superimposed on stage 5 chronic kidney disease, not on chronic dialysis (CMS/HCC)    Benign essential hypertension    Type 2 diabetes mellitus without complication, with long-term current use of insulin (CMS/[facility])    Diabetic retinopathy (CMS/[facility])    Mixed hyperlipidemia    Acquired hypothyroidism    Sleep apnea    Anemia of chronic renal failure, stage 5 (CMS/HCC)    Metabolic acidosis    Leukocytosis           Brief Hospital Course to date:  Kevin Aguero is a 63 y.o. male with a history of T2DM (diet controlled, s/p gastric bypass), HTN, HLD, hypothyroidism, and stage V chronic kidney disease related to diabetes who has not yet started dialysis and is  currently nearing a planned transplant with his wife presented from his PCP's office due to worsening renal function. Evaluation in the ED revealed Cr of 8.5 (previously 6.8 earlier this month) with bicarb of 10 with ABG showing significant metabolic acidosis with pH of 7.15.  Pt was admitted to our service and Nephrology was consulted.    Plan:  --GIOVANNY on CKD stage V- now getting HD.  Case management working on outpatient chair time.      --Metabolic acidosis- due to above.   -- Leukocytosis- resolved. UA unremarkable. Monitor  --JEAN PAUL- CPAP at night  --AOCD- monitor and transfuse PRN HgB<7        DVT Prophylaxis:  mechanical    Disposition: I expect the patient to be discharged TBD- hopefully once chair time arranged.      CODE STATUS:   Code Status and Medical Interventions:   Ordered at: 02/26/19 2208     Code Status:    CPR     Medical Interventions (Level of Support Prior to Arrest):    Full         Electronically signed by TORY Klein, 03/01/19, 1:07 PM.

## 2019-03-02 PROBLEM — N17.9 ACUTE RENAL FAILURE SUPERIMPOSED ON STAGE 5 CHRONIC KIDNEY DISEASE, NOT ON CHRONIC DIALYSIS (HCC): Status: RESOLVED | Noted: 2019-01-01 | Resolved: 2019-01-01

## 2019-03-02 PROBLEM — N18.6 ESRD (END STAGE RENAL DISEASE) ON DIALYSIS (HCC): Status: ACTIVE | Noted: 2019-01-01

## 2019-03-02 PROBLEM — Z99.2 ESRD (END STAGE RENAL DISEASE) ON DIALYSIS (HCC): Status: ACTIVE | Noted: 2019-01-01

## 2019-03-02 PROBLEM — N18.5 ACUTE RENAL FAILURE SUPERIMPOSED ON STAGE 5 CHRONIC KIDNEY DISEASE, NOT ON CHRONIC DIALYSIS (HCC): Status: RESOLVED | Noted: 2019-01-01 | Resolved: 2019-01-01

## 2019-03-02 NOTE — DISCHARGE SUMMARY
[facility] Medicine Services  DISCHARGE SUMMARY    Patient Name: Kevin Aguero  : 1955  MRN: [id number]    Date of Admission: 2019  Date of Discharge:  3/2/2018  Primary Care Physician: Gilson Gates MD    Consults     Date and Time Order Name Status Description    2019 Inpatient General Surgery Consult Completed     2019 Inpatient Nephrology Consult      2019 Inpatient General Surgery Consult Completed     2019 Inpatient General Surgery Consult Completed         Hospital Course     Presenting Problem:   Acute on chronic renal failure (CMS/HCC) [N17.9, N18.9]  Acute renal failure superimposed on stage 5 chronic kidney disease, not on chronic dialysis (CMS/HCC) [N17.9, N18.5]    Active Hospital Problems    Diagnosis Date Noted   • ESRD (end stage renal disease) on dialysis (CMS/[facility]) [N18.6, Z99.2] 2019   • Metabolic acidosis [E87.2] 2019   • Leukocytosis [D72.829] 2019   • Anemia of chronic renal failure, stage 5 (CMS/[facility]) [N18.5, D63.1] 10/03/2018   • Benign essential hypertension [I10] 2016   • Type 2 diabetes mellitus without complication, with long-term current use of insulin (CMS/[facility]) [E11.9, Z79.4] 2016   • Diabetic retinopathy (CMS/[facility]) [E11.319] 2016   • Acquired hypothyroidism [E03.9] 2016   • Sleep apnea [G47.30] 2016   • Mixed hyperlipidemia [E78.2] 2016      Resolved Hospital Problems    Diagnosis Date Noted Date Resolved   • **Acute renal failure superimposed on stage 5 chronic kidney disease, not on chronic dialysis (CMS/HCC) [N17.9, N18.5] 2019      Hospital Course:  Kevin Aguero is a 63 y.o. male with PMH significant for HTN, hyperlipidemia, hypothyroidism, JEAN PAUL on COPD, non-insulin dependent DMII and CKD IV. He is followed by Dr. Green of [facility] and is in the process of being evaluated for a kidney transplant through the [facility]  [place].     Creatinine on 2/14 was 6.78. He was admitted to [facility] on 2/26/19 after his creatinine was found to be > 8. He reported a 2 week history of progressively worsening BLE edema and intermittent dysuria. He was admitted to the hospital medicine service. Nephrology and general surgery teams were consulted. He was started on a bicarbonate drip due to metabolic acidosis. CXR and UA were unrevealing.     Dr. Sandhu placed tunneled HD catheter on 2/27. Patient was dialyzed on 2/27, 2/28 and 3/1.   An outpatient HD chair has been arranged at [facility] in [place], [place] - first session on Monday 3/4 @ 11:45am.     Mr. Aguero is stable for discharge. He will return home on 3/2/19.     Day of Discharge     HPI:   Up in chair. Anxious. Has a lot of questions about home medications vs medications he's been receiving in the hospital. All questions answered. Looking forward to returning home.     Review of Systems  Gen- No fevers, chills  CV- No chest pain, palpitations  Resp- No cough, dyspnea  GI- No N/V/D, abd pain    Otherwise ROS is negative except as mentioned in the HPI.    Vital Signs:   Temp:  [97.4 °F (36.3 °C)-98.1 °F (36.7 °C)] 97.4 °F (36.3 °C)  Heart Rate:  [61-71] 61  Resp:  [18] 18  BP: (138-156)/(73-99) 138/81     Physical Exam:  Constitutional: No acute distress, awake, alert  HENT: NCAT, mucous membranes moist  Respiratory: Clear to auscultation bilaterally, respiratory effort normal   Cardiovascular: RRR, no murmurs, rubs, or gallops, palpable pedal pulses bilaterally  Gastrointestinal: Positive bowel sounds, soft, nontender, nondistended  Musculoskeletal: 1+ bilateral ankle edema. R upper chest tunneled HD catheter, intact without surrounding erythema or induration.   Psychiatric: Appropriate affect, cooperative  Neurologic: Oriented x 3, strength symmetric in all extremities, Cranial Nerves grossly intact to confrontation, speech clear  Skin: No rashes    Pertinent  and/or  Most Recent Results     Results from last 7 days   Lab Units 03/01/19  0355 02/27/19  0802 02/26/19  1753   WBC 10*3/mm3 9.29 9.19 12.67*   HEMOGLOBIN g/dL 9.0* 8.9* 10.0*   HEMATOCRIT % 27.3* 28.4* 31.5*   PLATELETS 10*3/mm3 179 190 211   SODIUM mmol/L 139 142 142   POTASSIUM mmol/L 3.3* 4.8 5.1   CHLORIDE mmol/L 109 118* 115*   CO2 mmol/L 16.0* 10.0* <10.0*   BUN mg/dL 78* 131* 135*   CREATININE mg/dL 6.00* 8.50* 8.47*   GLUCOSE mg/dL 136* 123* 110*   CALCIUM mg/dL 7.0* 7.1* 7.8*     Results from last 7 days   Lab Units 02/27/19  0802 02/26/19  1753   BILIRUBIN mg/dL  --  0.2*   ALK PHOS U/L  --  130*   ALT (SGPT) U/L  --  29   AST (SGOT) U/L  --  20   PROTIME Seconds 15.2*  --    INR  1.26*  --    APTT seconds 38.4*  --      Results from last 7 days   Lab Units 02/27/19  0802 02/26/19 2111 02/26/19  1753   HEMOGLOBIN A1C % 5.00  --   --    BNP pg/mL  --   --  977.0*   TROPONIN I ng/mL  --  0.042* 0.033     Brief Urine Lab Results  (Last result in the past 365 days)      Color   Clarity   Blood   Leuk Est   Nitrite   Protein   CREAT   Urine HCG        02/27/19 0040 Yellow Clear Trace Negative Negative >=300 mg/dL (3+)             Microbiology Results Abnormal     None        Imaging Results (all)     Procedure Component Value Units Date/Time    FL C Arm During Surgery [[id number]] Collected:  02/28/19 1056     Updated:  03/01/19 1428    Narrative:       EXAMINATION: FL C-ARM DURING SURGERY-02/27/2019:     INDICATION: Dialysis cath; N18.9-Chronic kidney disease, unspecified;  Z74.09-Other reduced mobility.      TECHNIQUE: Intraoperative fluoroscopy for improved localization and  treatment planning.     COMPARISON: NONE.     FINDINGS: Intraoperative fluoroscopy with total fluoroscopic time usage  30 seconds and 3 representative images saved during dialysis catheter  insertion.       Impression:       Intraoperative fluoroscopy utilized during dialysis catheter  insertion.     D:  02/28/2019  E:  02/28/2019     This  report was finalized on 3/1/2019 2:26 PM by Dr. Stu Cain.       XR Chest 1 View [[id number]] Collected:  02/27/19 1614     Updated:  02/27/19 1639    Narrative:          EXAMINATION: XR CHEST 1 VW - 2/27/2019     INDICATION: N18.9-Chronic kidney disease, unspecified; Z74.09-Other  reduced mobility.      COMPARISON: 2/26/2019     FINDINGS: A large-bore catheter is inserted from a right internal  jugular approach and is well-positioned in the superior vena cava.  Cardiac silhouette is normal and there is no pulmonary abnormality.  There is a skinfold simulating a right pneumothorax.           Impression:       Dialysis catheter is well-positioned. There are no  pathological cardiopulmonary findings.     DICTATED:   2/27/2019  EDITED/ls :   2/27/2019      This report was finalized on 2/27/2019 4:37 PM by Dr. Daniel Youngblood MD.       XR Chest 1 View [[id number]] Collected:  02/26/19 2051     Updated:  02/26/19 2151    Narrative:       EXAM:    XR Chest, 1 View     EXAM DATE/TIME:    2/26/2019 8:51 PM     CLINICAL HISTORY:    63 years old, male; Chronic kidney disease, unspecified; Signs and symptoms;   Other: Weak     TECHNIQUE:    XR of the chest, 1 view.     COMPARISON:    CR XR CHEST PA AND LATERAL 11/13/2017 11:16 AM     FINDINGS:    Lungs:  Calcified granulomas in the right lower lobe.    Pleural space: Normal.    Heart/Mediastinum: Normal.    Bones/joints:  Partially visualized soft tissue anchor projects over the left   humeral head, likely secondary to prior rotator cuff repair. Multilevel   thoracic spine degenerative changes.       Impression:       No acute cardiopulmonary abnormality.    THIS DOCUMENT HAS BEEN ELECTRONICALLY SIGNED BY BREE FELICIANO MD        Discharge Details        Discharge Medications      New Medications      Instructions Start Date   gabapentin 800 MG tablet  Commonly known as:  NEURONTIN  Replaces:  gabapentin 400 MG capsule   400 mg, Oral, Daily         Changes to Medications       Instructions Start Date   amLODIPine 10 MG tablet  Commonly known as:  NORVASC  What changed:    · how much to take  · how to take this  · when to take this   10 mg, Oral, Daily      calcitriol 0.25 MCG capsule  Commonly known as:  ROCALTROL  What changed:    · how much to take  · how to take this  · when to take this   0.25 mcg, Oral, Daily      calcium acetate 667 MG capsule capsule  Commonly known as:  PHOS BINDER)  What changed:    · how much to take  · how to take this  · when to take this   667 mg, Oral, 3 Times Daily      potassium chloride ER 20 MEQ tablet controlled-release ER tablet  Commonly known as:  K-TAB  What changed:    · how much to take  · how to take this  · when to take this  · additional instructions   20 mEq, Oral, Daily, (Take if you take Lasix)         Continue These Medications      Instructions Start Date   allopurinol 100 MG tablet  Commonly known as:  ZYLOPRIM   TAKE 1 TABLET BY MOUTH  DAILY FOR GOUT      atorvastatin 20 MG tablet  Commonly known as:  LIPITOR   20 mg, Oral, Daily      B-D ULTRAFINE III SHORT PEN 31G X 8 MM misc  Generic drug:  Insulin Pen Needle   Does not apply, 4 Times Daily, USE qid; For: Diabetes mellitus      buPROPion 100 MG tablet  Commonly known as:  WELLBUTRIN   TAKE 1 TABLET BY MOUTH 3  TIMES A DAY      BYSTOLIC 10 MG tablet  Generic drug:  nebivolol   TAKE 1 TABLET BY MOUTH  DAILY      doxazosin 2 MG tablet  Commonly known as:  CARDURA   2 mg, Oral, 2 Times Daily      furosemide 40 MG tablet  Commonly known as:  LASIX   No dose, route, or frequency recorded.      glucose blood test strip  Commonly known as:  ONE TOUCH ULTRA TEST   1 each, Other, Daily, TEST; For: Diabetes mellitus      levothyroxine 125 MCG tablet  Commonly known as:  SYNTHROID, LEVOTHROID   125 mcg, Oral, Daily      ONETOUCH DELICA LANCETS 33G misc   Does not apply, Use for blood sugar testing twice daily; For: Benign essential hypertension      Unable to find   2 each, Oral, 2 Times Daily,  MONI PACK 4 CAPSULES IN PACK (MULTIVITAMIN) OTC,  TAKE 2 CAPSULES BID       venlafaxine 75 MG tablet  Commonly known as:  EFFEXOR   37.5 mg, Oral, 2 Times Daily         Stop These Medications    gabapentin 400 MG capsule  Commonly known as:  NEURONTIN  Replaced by:  gabapentin 800 MG tablet     sodium bicarbonate 650 MG tablet          Allergies   Allergen Reactions   • Sulfa Antibiotics Other (See Comments)     Patient does not remember the reaction symptoms     Discharge Disposition:  Home or Self Care    Discharge Diet:  Diet Order   Procedures   • Diet Regular; Cardiac, Consistent Carbohydrate, Renal     Discharge Activity:   Activity Instructions     Activity as Tolerated      You may shower as needed. Keep tunneled HD catheter covered while showering and keep as dry as possible        CODE STATUS:    Code Status and Medical Interventions:   Ordered at: 02/26/19 2208     Code Status:    CPR     Medical Interventions (Level of Support Prior to Arrest):    Full     Future Appointments   Date Time Provider Department Center   3/14/2019  1:00 PM CHAIR 21 [facility]   3/26/2019  2:30 PM NURSE HAIR BAUTISTA [facility] None   3/28/2019  8:15 AM Anuradha Charles MD [facility] None   4/30/2019  8:15 AM Gilson Gates MD [facility] None     Time Spent on Discharge:  40 minutes    Electronically signed by Zakia Fisher PA-C, 03/02/19, 12:21 PM.

## 2019-03-02 NOTE — PROGRESS NOTES
"   LOS: 4 days    Patient Care Team:  Gilson Gates MD as PCP - General    Chief Complaint:      Subjective      No acute events overnight. No new complaints.     Review of Systems:   Patient denies shortness of breath, chest pain, dysuria, hematuria, nausea, vomiting.      Objective     Vital Sign Min/Max for last 24 hours  Temp  Min: 97.4 °F (36.3 °C)  Max: 98.1 °F (36.7 °C)   BP  Min: 133/74  Max: 156/86   Pulse  Min: 61  Max: 77   Resp  Min: 18  Max: 18   No Data Recorded   No Data Recorded   Weight  Min: 107 kg (234 lb 14.4 oz)  Max: 107 kg (234 lb 14.4 oz)     Flowsheet Rows      First Filed Value   Admission Height  182.9 cm (72\") Documented at 02/26/2019 1746   Admission Weight  113 kg (249 lb) Documented at 02/26/2019 1746          I/O this shift:  In: 250 [P.O.:250]  Out: -   I/O last 3 completed shifts:  In: 660 [P.O.:660]  Out: 4005 [Urine:1875; Other:2130]    Physical Exam:  General Appearance: Alert, oriented, no obvious distress.  Uremic breath  Eyes: PER, EOMI.  Neck: Supple no JVD.  Lungs: Clear auscultation, no rales rhonchi's, equal chest movement, nonlabored.  Heart: No gallop, murmur, rub, RRR.  Abdomen: Soft, nontender, positive bowel sounds, no organomegaly.  Extremities: Trace edema, no cyanosis.  Neuro: No focal deficit, moving all extremities, alert oriented X 3  Tunnel catheter right IJ          WBC WBC   Date Value Ref Range Status   03/01/2019 9.29 3.50 - 10.80 10*3/mm3 Final      HGB Hemoglobin   Date Value Ref Range Status   03/01/2019 9.0 (L) 13.1 - 17.5 g/dL Final      HCT Hematocrit   Date Value Ref Range Status   03/01/2019 27.3 (L) 38.9 - 50.9 % Final      Platlets No results found for: LABPLAT   MCV MCV   Date Value Ref Range Status   03/01/2019 94.5 80.0 - 99.0 fL Final          Sodium Sodium   Date Value Ref Range Status   03/01/2019 139 132 - 146 mmol/L Final      Potassium Potassium   Date Value Ref Range Status   03/01/2019 3.3 (L) 3.5 - 5.5 mmol/L Final      Chloride " Chloride   Date Value Ref Range Status   03/01/2019 109 99 - 109 mmol/L Final      CO2 CO2   Date Value Ref Range Status   03/01/2019 16.0 (L) 20.0 - 31.0 mmol/L Final      BUN BUN   Date Value Ref Range Status   03/01/2019 78 (H) 9 - 23 mg/dL Final      Creatinine Creatinine   Date Value Ref Range Status   03/01/2019 6.00 (H) 0.60 - 1.30 mg/dL Final      Calcium Calcium   Date Value Ref Range Status   03/01/2019 7.0 (L) 8.7 - 10.4 mg/dL Final      PO4 No results found for: CAPO4   Albumin No results found for: ALBUMIN   Magnesium No results found for: MG   Uric Acid No results found for: URICACID        Results Review:     I reviewed the patient's new clinical results.      allopurinol 100 mg Oral Daily   amLODIPine 10 mg Oral Q24H   atorvastatin 20 mg Oral Daily   buPROPion 100 mg Oral TID   calcitriol 0.25 mcg Oral Daily   calcium acetate 667 mg Oral TID With Meals   epoetin cristel 10,000 Units Subcutaneous Once per day on Mon Wed Fri   furosemide 40 mg Oral Daily   gabapentin 400 mg Oral Daily   levothyroxine 125 mcg Oral Q AM   nebivolol 10 mg Oral Daily   sodium chloride 3 mL Intravenous Q12H   terazosin 2 mg Oral Nightly   venlafaxine 37.5 mg Oral BID       lactated ringers 100 mL/hr       Medication Review:     Assessment/Plan       Acute renal failure superimposed on stage 5 chronic kidney disease, not on chronic dialysis (CMS/[facility])    Benign essential hypertension    Type 2 diabetes mellitus without complication, with long-term current use of insulin (CMS/[facility])    Diabetic retinopathy (CMS/[facility])    Mixed hyperlipidemia    Acquired hypothyroidism    Sleep apnea    Anemia of chronic renal failure, stage 5 (CMS/[facility])    Metabolic acidosis    Leukocytosis      1- CKD stage V with sign and symptoms of uremia. Now ESRD.  2- Volume overload   3- Anemia of chronic disease: Iron saturation 58%  4- hyperphosphatemia   5- Secondary Hyperparathyroidism.     Plan:  - Next Dialysis on Monday at dialysis unit  - Renal diet  -  Phoslo with meals  - Avoid nephrotoxic agents.   - Continue with Calcitriol.   - Epo with HD.            Hui Adam MD  03/02/19  9:47 AM

## 2019-03-02 NOTE — PLAN OF CARE
Problem: Kidney Disease, Chronic/End Stage Renal Disease (Adult)  Goal: Signs and Symptoms of Listed Potential Problems Will be Absent, Minimized or Managed (Kidney Disease, Chronic/End Stage Renal Disease)  Outcome: Ongoing (interventions implemented as appropriate)   03/02/19 0238   Goal/Outcome Evaluation   Problems Assessed (Chronic Kidney Disease/ESRD) all   Problems Present (Chronic Kidney/ESRD) fluid imbalance       Problem: Fall Risk (Adult)  Goal: Identify Related Risk Factors and Signs and Symptoms  Outcome: Ongoing (interventions implemented as appropriate)   03/02/19 0238   Fall Risk (Adult)   Related Risk Factors (Fall Risk) age-related changes;fatigue/slow reaction;gait/mobility problems;history of falls;environment unfamiliar   Signs and Symptoms (Fall Risk) presence of risk factors     Goal: Absence of Fall  Outcome: Ongoing (interventions implemented as appropriate)   03/02/19 0238   Fall Risk (Adult)   Absence of Fall making progress toward outcome       Problem: Skin Injury Risk (Adult)  Goal: Identify Related Risk Factors and Signs and Symptoms  Outcome: Ongoing (interventions implemented as appropriate)   03/02/19 0238   Skin Injury Risk (Adult)   Related Risk Factors (Skin Injury Risk) edema;hospitalization prolonged;mobility impaired;tissue perfusion altered     Goal: Skin Health and Integrity  Outcome: Ongoing (interventions implemented as appropriate)   03/02/19 0238   Skin Injury Risk (Adult)   Skin Health and Integrity making progress toward outcome       Problem: Patient Care Overview  Goal: Plan of Care Review  Outcome: Ongoing (interventions implemented as appropriate)   03/02/19 0238   Coping/Psychosocial   Plan of Care Reviewed With patient   Plan of Care Review   Progress improving       Problem: Hemodialysis (Adult)  Goal: Signs and Symptoms of Listed Potential Problems Will be Absent, Minimized or Managed (Hemodialysis)  Outcome: Ongoing (interventions implemented as appropriate)    03/02/19 0238   Goal/Outcome Evaluation   Problems Assessed (Hemodialysis) all   Problems Present (Hemodialysis) fluid imbalance

## 2019-03-04 NOTE — OUTREACH NOTE
CODY call completed.  Please refer to TCM call flowsheet for call documentation.  Patient reports that he is doing well.  He denies n/v/d/c, pain, and questions regarding meds and d/c instructions from hospital.  He reports that swelling in his legs is minimal.  He is on his was to dialysis.  Appointment scheduled and confirmed.

## 2019-03-04 NOTE — OUTREACH NOTE
Prep Survey      Responses   Facility patient discharged from?  [facility]   Is patient eligible?  Yes   Discharge diagnosis  Acute renal failure superimposed on stage 5 chronic kidney disease, not on chronic dialysis    Does the patient have one of the following disease processes/diagnoses(primary or secondary)?  Other   Does the patient have Home health ordered?  No   Is there a DME ordered?  No   Prep survey completed?  Yes          Michaela Lyon RN

## 2019-03-06 NOTE — OUTREACH NOTE
Medical Week 1 Survey      Responses   Facility patient discharged from?  [facility]   Does the patient have one of the following disease processes/diagnoses(primary or secondary)?  Other   Is there a successful TCM telephone encounter documented?  Yes          Yudy Butt RN

## 2019-03-07 NOTE — PROGRESS NOTES
Transitional Care Follow Up Visit  Subjective     Kevin Aguero is a 63 y.o. male who presents for a transitional care management visit.    Within 48 business hours after discharge our office contacted him via telephone to coordinate his care and needs.      I reviewed and discussed the details of that call along with the discharge summary, hospital problems, inpatient lab results, inpatient diagnostic studies, and consultation reports with Kevin.     Current outpatient and discharge medications have been reconciled for the patient.    Date of TCM Phone Call 3/4/2019   [facility]   Date of Admission 2/26/2019   Date of Discharge 3/2/2019   Discharge Disposition Home or Self Care     Risk for Readmission (LACE) Score: 12 (3/2/2019  6:00 AM)      History of Present Illness   Course During Hospital Stay:   He was admitted because of renal failure.  He was dialyzed and had felt much better.  Currently he is going 3 times weekly.  He hopes to be able to wait for a kidney transplant from his wife and avoid a fistula.  IDDM has been stable.  Edema is better.  Anemia is a little worse related to his renal failure.     The following portions of the patient's history were reviewed and updated as appropriate: allergies, current medications, past medical history and problem list.    Review of Systems   Constitutional: Positive for fatigue. Negative for fever.   Respiratory: Negative.  Negative for cough, choking, shortness of breath and wheezing.    Cardiovascular: Positive for leg swelling (trace.). Negative for chest pain and palpitations.   Gastrointestinal: Negative.  Negative for abdominal distention and abdominal pain.       Objective   Physical Exam   Constitutional: He appears well-developed and well-nourished.   140/80 by me.   Neck: Normal range of motion. Neck supple.   Cardiovascular: Normal rate, regular rhythm and normal heart sounds. Exam reveals no gallop and no friction rub.   No murmur  heard.  Pulmonary/Chest: Effort normal and breath sounds normal. No respiratory distress. He has no wheezes. He has no rales. He exhibits no tenderness.   Abdominal: Soft. Bowel sounds are normal. He exhibits no distension and no mass. There is no tenderness. There is no guarding.   Musculoskeletal: He exhibits edema (trace in ankles bilaterally.).   Nursing note and vitals reviewed.      Assessment/Plan   Kevin was seen today for hospital follow up.    Diagnoses and all orders for this visit:    Benign essential hypertension  Stable on meds, no changes.    Type 2 diabetes mellitus without complication, with long-term current use of insulin (CMS/[facility])  Stable on same meds.    Anemia of chronic renal failure, stage 5 (CMS/[facility])  Followed via nephrology.    ESRD (end stage renal disease) on dialysis (CMS/HCC)  Followed by nephrology.    He has an appointment with me in April.     Transitional Care Management Certification  I certify that the following are true:  1. Communication was made within 2 business days of discharge.  2. Complexity of Medical Decision Making is moderate.  3. Face to face visit occurred within 14 days.    *Note: 44690 is for high complexity patients with a face to face visit within 7 days of discharge.  88805 is for high complexity patients with a face to face on days 8-14 post discharge or medium complexity with face to face visit within 14 days post discharge.

## 2019-03-12 NOTE — OUTREACH NOTE
Medical Week 2 Survey      Responses   Facility patient discharged from?  [place]   Does the patient have one of the following disease processes/diagnoses(primary or secondary)?  Other   Week 2 attempt successful?  No   Unsuccessful attempts  Attempt 1          Madhavi Rivas RN

## 2019-03-14 NOTE — OUTREACH NOTE
Medical Week 2 Survey      Responses   Facility patient discharged from?  [facility]   Does the patient have one of the following disease processes/diagnoses(primary or secondary)?  Other   Week 2 attempt successful?  Yes   Call start time  1143   Discharge diagnosis  Acute renal failure superimposed on stage 5 chronic kidney disease, not on chronic dialysis    Call end time  1145   Meds reviewed with patient/caregiver?  Yes   Is the patient having any side effects they believe may be caused by any medication additions or changes?  No   Does the patient have all medications ordered at discharge?  Yes   Is the patient taking all medications as directed (includes completed medication regime)?  Yes   Does the patient have a primary care provider?   Yes   Does the patient have an appointment with their PCP within 7 days of discharge?  Yes   Has the patient kept scheduled appointments due by today?  Yes   Psychosocial issues?  No   Did the patient receive a copy of their discharge instructions?  Yes   Nursing interventions  Reviewed instructions with patient   What is the patient's perception of their health status since discharge?  Improving   Is the patient/caregiver able to teach back signs and symptoms related to disease process for when to call PCP?  Yes   Is the patient/caregiver able to teach back signs and symptoms related to disease process for when to call 911?  Yes   Is the patient/caregiver able to teach back the hierarchy of who to call/visit for symptoms/problems? PCP, Specialist, Home health nurse, Urgent Care, ED, 911  Yes   Week 2 Call Completed?  Yes          Yudy Butt RN

## 2019-03-21 NOTE — OUTREACH NOTE
Medical Week 3 Survey      Responses   Facility patient discharged from?  [facility]   Does the patient have one of the following disease processes/diagnoses(primary or secondary)?  Other   Week 3 attempt successful?  Yes   Call start time  1432   Call end time  1438   Discharge diagnosis  Acute renal failure superimposed on stage 5 chronic kidney disease, not on chronic dialysis    Meds reviewed with patient/caregiver?  Yes   Is the patient having any side effects they believe may be caused by any medication additions or changes?  No   Does the patient have all medications ordered at discharge?  Yes   Is the patient taking all medications as directed (includes completed medication regime)?  Yes   Has the patient kept scheduled appointments due by today?  Yes   Psychosocial issues?  No   What is the patient's perception of their health status since discharge?  Improving   Week 3 Call Completed?  Yes          Maeve Medina RN

## 2019-03-28 PROBLEM — N18.5 STAGE 5 CHRONIC KIDNEY DISEASE NOT ON CHRONIC DIALYSIS (HCC): Status: ACTIVE | Noted: 2018-10-10

## 2019-03-28 PROBLEM — E78.5 HLD (HYPERLIPIDEMIA): Status: ACTIVE | Noted: 2019-01-01

## 2019-03-28 PROBLEM — M19.90 OA (OSTEOARTHRITIS): Status: ACTIVE | Noted: 2019-01-01

## 2019-03-28 PROBLEM — E11.9 DM II (DIABETES MELLITUS, TYPE II), CONTROLLED (HCC): Status: ACTIVE | Noted: 2019-01-01

## 2019-03-28 PROBLEM — I10 HTN (HYPERTENSION): Status: ACTIVE | Noted: 2019-01-01

## 2019-03-28 PROBLEM — Z01.818 PRE-TRANSPLANT EVALUATION FOR KIDNEY TRANSPLANT: Status: ACTIVE | Noted: 2018-10-23

## 2019-03-28 NOTE — OUTREACH NOTE
Medical Week 4 Survey      Responses   Facility patient discharged from?  [facility]   Does the patient have one of the following disease processes/diagnoses(primary or secondary)?  Other   Week 4 attempt successful?  Yes   Call start time  1436   Call end time  1440   Discharge diagnosis  Acute renal failure superimposed on stage 5 chronic kidney disease, not on chronic dialysis    Meds reviewed with patient/caregiver?  Yes   Is the patient having any side effects they believe may be caused by any medication additions or changes?  No   Is the patient taking all medications as directed (includes completed medication regime)?  Yes   Has the patient kept scheduled appointments due by today?  Yes   Comments  Seen by Saloni today 03/28/2019   Is the patient still receiving Home Health Services?  N/A   Psychosocial issues?  No   What is the patient's perception of their health status since discharge?  Improving   Is the patient/caregiver able to teach back signs and symptoms related to disease process for when to call PCP?  Yes   Is the patient/caregiver able to teach back signs and symptoms related to disease process for when to call 911?  Yes   Is the patient/caregiver able to teach back the hierarchy of who to call/visit for symptoms/problems? PCP, Specialist, Home health nurse, Urgent Care, ED, 911  Yes   Week 4 Call Completed?  Yes   Would the patient like one additional call?  No   Graduated  Yes   Did the patient feel the follow up calls were helpful during their recovery period?  Yes   Was the number of calls appropriate?  Yes          Joseph Reyes RN

## 2019-03-28 NOTE — PROGRESS NOTES
Kevin Aguero 63 y.o.  [facility]:Follow-up; Prediabetes (eye exam was 3/18); Hypertension; Hypothyroidism; Hyperlipidemia; and Chronic Kidney Disease (currently on dialysis and is awaiting a kidney transplant )      [place]: Follow-up; Prediabetes (eye exam was 3/18); Hypertension; Hypothyroidism; Hyperlipidemia; and Chronic Kidney Disease (currently on dialysis and is awaiting a kidney transplant )    Blood sugar and 90 day average sugar reviewed  Results for orders placed or performed in visit on 03/28/19   POC Glycosylated Hemoglobin (Hb A1C)   Result Value Ref Range    Hemoglobin A1C 5.4 %   POC Glucose Fingerstick   Result Value Ref Range    Glucose 103 70 - 130 mg/dL     Average sugar is normal  bp is good   Energy is good overall  Is on low fat diet   Is now on dialysis and awaiting renal transplant   Started dialysis 3 weeks ago   Is utd with eye exam  No foot callus or ulcer  Wife is going to donate kidney     Allergies   Allergen Reactions   • Sulfa Antibiotics Other (See Comments)     Patient does not remember the reaction symptoms       Current Outpatient Medications:   •  allopurinol (ZYLOPRIM) 100 MG tablet, TAKE 1 TABLET BY MOUTH  DAILY FOR GOUT, Disp: 90 tablet, Rfl: 0  •  amLODIPine (NORVASC) 10 MG tablet, Take 1 tablet by mouth Daily., Disp: , Rfl:   •  atorvastatin (LIPITOR) 20 MG tablet, Take 1 tablet by mouth Daily., Disp: 90 tablet, Rfl: 1  •  buPROPion (WELLBUTRIN) 100 MG tablet, TAKE 1 TABLET BY MOUTH 3  TIMES A DAY, Disp: 270 tablet, Rfl: 2  •  BYSTOLIC 10 MG tablet, TAKE 1 TABLET BY MOUTH  DAILY, Disp: 90 tablet, Rfl: 0  •  calcitriol (ROCALTROL) 0.25 MCG capsule, Take 1 capsule by mouth Daily., Disp: , Rfl:   •  calcium acetate (PHOS BINDER,) 667 MG capsule capsule, Take 1 capsule by mouth 3 (Three) Times a Day. (Patient taking differently: Take 1,334 mg by mouth 3 (Three) Times a Day.), Disp: 180 capsule, Rfl:   •  doxazosin (CARDURA) 2 MG tablet, Take 1 tablet by mouth 2 (Two) Times a Day., Disp:  180 tablet, Rfl: 1  •  furosemide (LASIX) 40 MG tablet, , Disp: , Rfl:   •  gabapentin (NEURONTIN) 800 MG tablet, Take 0.5 tablets by mouth Daily., Disp: , Rfl:   •  glucose blood (ONE TOUCH ULTRA TEST) test strip, 1 each by Other route Daily. TEST; For: Diabetes mellitus, Disp: 50 each, Rfl: 5  •  Insulin Pen Needle (B-D ULTRAFINE III SHORT PEN) 31G X 8 MM misc, 4 (four) times a day. USE qid; For: Diabetes mellitus, Disp: , Rfl:   •  levothyroxine (SYNTHROID, LEVOTHROID) 125 MCG tablet, Take 1 tablet by mouth Daily., Disp: 90 tablet, Rfl: 1  •  ONETOUCH DELICA LANCETS 33G misc, Use for blood sugar testing twice daily; For: Benign essential hypertension, Disp: , Rfl:   •  potassium chloride ER (K-TAB) 20 MEQ tablet controlled-release ER tablet, Take 1 tablet by mouth Daily. (Take if you take Lasix), Disp: 60 tablet, Rfl:   •  Unable to find, Take 2 each by mouth 2 (Two) Times a Day. MONI PACK 4 CAPSULES IN PACK (MULTIVITAMIN) OTC,  TAKE 2 CAPSULES BID, Disp: , Rfl:   •  venlafaxine (EFFEXOR) 75 MG tablet, Take 0.5 tablets by mouth 2 (Two) Times a Day., Disp: 45 tablet, Rfl: 3    Current Facility-Administered Medications:   •  cyanocobalamin injection 1,000 mcg, 1,000 mcg, Intramuscular, Q28 Days, Gilson Gaets MD, 1,000 mcg at 03/26/19 1447  Patient Active Problem List    Diagnosis   • ESRD (end stage renal disease) on dialysis (CMS/[facility]) [N18.6, Z99.2]   • Metabolic acidosis [E87.2]   • Leukocytosis [D72.829]   • Anemia of chronic renal failure, stage 5 (CMS/[facility]) [N18.5, D63.1]   • Callus [L84]   • Metabolic disorder [E88.9]   • Onychomycosis [B35.1]   • Sciatica [M54.30]   • Benign essential hypertension [I10]   • Type 2 diabetes mellitus without complication, with long-term current use of insulin (CMS/[facility]) [E11.9, Z79.4]   • Diabetic retinopathy (CMS/HCC) [E11.319]   • Edema [R60.9]   • Mixed hyperlipidemia [E78.2]   • Acquired hypothyroidism [E03.9]   • Morbid obesity due to excess calories (CMS/[facility])  [E66.01]   • Renal insufficiency [N28.9]   • Sleep apnea [G47.30]   • Cobalamin deficiency [E53.8]     Review of Systems   Constitutional: Negative for activity change, appetite change, chills, diaphoresis, fatigue, fever and unexpected weight change.   HENT: Negative for congestion, dental problem, drooling, ear discharge, ear pain, facial swelling, hearing loss, mouth sores, nosebleeds, postnasal drip, rhinorrhea, sinus pressure, sneezing, sore throat, tinnitus, trouble swallowing and voice change.    Eyes: Negative for photophobia, pain, discharge, redness, itching and visual disturbance.   Respiratory: Negative for apnea, cough, choking, chest tightness, shortness of breath, wheezing and stridor.    Cardiovascular: Positive for leg swelling. Negative for chest pain and palpitations.   Gastrointestinal: Negative for abdominal distention, abdominal pain, anal bleeding, blood in stool, constipation, diarrhea, nausea, rectal pain and vomiting.   Endocrine: Negative for cold intolerance, heat intolerance, polydipsia, polyphagia and polyuria.   Genitourinary: Negative for decreased urine volume, difficulty urinating, dysuria, enuresis, flank pain, frequency, genital sores, hematuria and urgency.   Musculoskeletal: Positive for arthralgias. Negative for back pain, gait problem, joint swelling, myalgias, neck pain and neck stiffness.   Skin: Negative for color change, pallor, rash and wound.   Allergic/Immunologic: Negative for environmental allergies, food allergies and immunocompromised state.   Neurological: Negative for dizziness, tremors, seizures, syncope, facial asymmetry, speech difficulty, weakness, light-headedness, numbness and headaches.   Hematological: Negative for adenopathy. Does not bruise/bleed easily.   Psychiatric/Behavioral: Negative for agitation, behavioral problems, confusion, decreased concentration, dysphoric mood, hallucinations, self-injury, sleep disturbance and suicidal ideas. The patient  "is not nervous/anxious and is not hyperactive.      Social History     Socioeconomic History   • Marital status:      Spouse name: Not on file   • Number of children: Not on file   • Years of education: Not on file   • Highest education level: Not on file   Tobacco Use   • Smoking status: Never Smoker   • Smokeless tobacco: Never Used   Substance and Sexual Activity   • Alcohol use: Yes     Alcohol/week: 0.6 oz     Types: 1 Cans of beer per week     Comment: once per month   • Drug use: No   • Sexual activity: Defer     Family History   Problem Relation Age of Onset   • No Known Problems Mother    • Diabetes Father    • Hypertension Father    • Cancer Father      /74   Pulse 56   Ht 182.9 cm (72\")   Wt 105 kg (231 lb 12.8 oz)   SpO2 97%   BMI 31.44 kg/m²   Physical Exam   Constitutional: He is oriented to person, place, and time. He appears well-developed and well-nourished.   HENT:   Head: Normocephalic and atraumatic.   Nose: Nose normal.   Mouth/Throat: Oropharynx is clear and moist.   Eyes: Conjunctivae, EOM and lids are normal. Pupils are equal, round, and reactive to light.   Neck: Trachea normal and normal range of motion. Neck supple. Carotid bruit is not present. No tracheal deviation present. No thyroid mass and no thyromegaly present.   Cardiovascular: Normal rate, regular rhythm, normal heart sounds and intact distal pulses. Exam reveals no gallop and no friction rub.   No murmur heard.  Pulmonary/Chest: Effort normal and breath sounds normal. No respiratory distress. He has no wheezes.   Musculoskeletal: Normal range of motion. He exhibits no edema or deformity.    Kevin had a diabetic foot exam performed today.   During the foot exam he had a monofilament test performed.    Neurological Sensory Findings - Unaltered hot/cold right ankle/foot discrimination and unaltered hot/cold left ankle/foot discrimination. Altered sharp/dull right ankle/foot discrimination and altered sharp/dull " left ankle/foot discrimination. Right ankle/foot altered proprioception and left ankle/foot altered proprioception.  Vascular Status -  His right foot exhibits abnormal foot edema. His right foot exhibits normal foot vasculature . His left foot exhibits abnormal foot edema. His left foot exhibits normal foot vasculature .  Skin Integrity  -  His right foot skin is intact.His left foot skin is intact..  Lymphadenopathy:     He has no cervical adenopathy.   Neurological: He is alert and oriented to person, place, and time. He has normal reflexes. He displays normal reflexes. No cranial nerve deficit.   Skin: Skin is warm and dry. No rash noted. No cyanosis or erythema. Nails show no clubbing.   Psychiatric: He has a normal mood and affect. His speech is normal and behavior is normal. Judgment and thought content normal. Cognition and memory are normal.   Nursing note and vitals reviewed.    Results for orders placed or performed during the hospital encounter of 02/26/19   Comprehensive Metabolic Panel   Result Value Ref Range    Glucose 110 (H) 70 - 100 mg/dL     (H) 9 - 23 mg/dL    Creatinine 8.47 (H) 0.60 - 1.30 mg/dL    Sodium 142 132 - 146 mmol/L    Potassium 5.1 3.5 - 5.5 mmol/L    Chloride 115 (H) 99 - 109 mmol/L    CO2 <10.0 (C) 20.0 - 31.0 mmol/L    Calcium 7.8 (L) 8.7 - 10.4 mg/dL    Total Protein 5.8 5.7 - 8.2 g/dL    Albumin 3.38 3.20 - 4.80 g/dL    ALT (SGPT) 29 7 - 40 U/L    AST (SGOT) 20 0 - 33 U/L    Alkaline Phosphatase 130 (H) 25 - 100 U/L    Total Bilirubin 0.2 (L) 0.3 - 1.2 mg/dL    eGFR Non African Amer 6 (L) >60 mL/min/1.73    Globulin 2.4 gm/dL    A/G Ratio 1.4 (L) 1.5 - 2.5 g/dL    BUN/Creatinine Ratio 15.9 7.0 - 25.0    Anion Gap  3.0 - 11.0 mmol/L   BNP   Result Value Ref Range    .0 (H) 0.0 - 100.0 pg/mL   CBC Auto Differential   Result Value Ref Range    WBC 12.67 (H) 3.50 - 10.80 10*3/mm3    RBC 3.24 (L) 4.20 - 5.76 10*6/mm3    Hemoglobin 10.0 (L) 13.1 - 17.5 g/dL     Hematocrit 31.5 (L) 38.9 - 50.9 %    MCV 97.2 80.0 - 99.0 fL    MCH 30.9 27.0 - 31.0 pg    MCHC 31.7 (L) 32.0 - 36.0 g/dL    RDW 16.3 (H) 11.3 - 14.5 %    RDW-SD 57.8 (H) 37.0 - 54.0 fl    MPV 10.2 6.0 - 12.0 fL    Platelets 211 150 - 450 10*3/mm3    Neutrophil % 89.2 (H) 41.0 - 71.0 %    Lymphocyte % 5.5 (L) 24.0 - 44.0 %    Monocyte % 4.8 0.0 - 12.0 %    Eosinophil % 0.3 0.0 - 3.0 %    Basophil % 0.2 0.0 - 1.0 %    Immature Grans % 0.2 0.0 - 0.6 %    Neutrophils, Absolute 11.30 (H) 1.50 - 8.30 10*3/mm3    Lymphocytes, Absolute 0.70 0.60 - 4.80 10*3/mm3    Monocytes, Absolute 0.61 0.00 - 1.00 10*3/mm3    Eosinophils, Absolute 0.04 0.00 - 0.30 10*3/mm3    Basophils, Absolute 0.02 0.00 - 0.20 10*3/mm3    Immature Grans, Absolute 0.03 0.00 - 0.05 10*3/mm3   Blood Gas, Arterial With Co-Ox   Result Value Ref Range    Site Left Radial     Abhijeet's Test N/A     pH, Arterial 7.149 (C) 7.350 - 7.450 pH units    pCO2, Arterial 18.0 mm Hg    pO2, Arterial 125.0 (H) 83.0 - 108.0 mm Hg    HCO3, Arterial 6.3 (L) 20.0 - 26.0 mmol/L    Base Excess, Arterial <-20.0 (L) 0.0 - 2.0 mmol/L    Hemoglobin, Blood Gas 9.9 (L) 13.5 - 17.5 g/dL    Hematocrit, Blood Gas 30.3 %    Oxyhemoglobin 91.9 (L) 94 - 99 %    Methemoglobin 3.70 (H) 0.00 - 1.50 %    Carboxyhemoglobin -0.5 (L) 0 - 2 %    CO2 Content 6.8 (L) 23 - 27 mmol/L    Temperature 37.0 C    Barometric Pressure for Blood Gas  mmHg    Modality Room Air     FIO2 21 %    Ventilator Mode       Note       Nurse helping out nurse in charge of pt notified of criticals and ABG left with her.    Notified Who Ann Marie Garcia RN.     Notified By [id number]     Notified Time 02/26/2019 19:23     pH, Temp Corrected 7.149 pH Units    pCO2, Temperature Corrected 18.0 (L) 35 - 48 mm Hg    pO2, Temperature Corrected 125 (H) 83 - 108 mm Hg   Troponin   Result Value Ref Range    Troponin I 0.033 <=0.039 ng/mL   Troponin   Result Value Ref Range    Troponin I 0.042 (H) <=0.039 ng/mL   Urinalysis With Culture  If Indicated - Urine, Clean Catch   Result Value Ref Range    Color, UA Yellow Yellow, Straw    Appearance, UA Clear Clear    pH, UA <=5.0 5.0 - 8.0    Specific Gravity, UA 1.013 1.001 - 1.030    Glucose, UA Negative Negative    Ketones, UA Negative Negative    Bilirubin, UA Negative Negative    Blood, UA Trace (A) Negative    Protein, UA >=300 mg/dL (3+) (A) Negative    Leuk Esterase, UA Negative Negative    Nitrite, UA Negative Negative    Urobilinogen, UA 0.2 E.U./dL 0.2 - 1.0 E.U./dL   Renal Function Panel   Result Value Ref Range    Glucose 123 (H) 70 - 100 mg/dL     (H) 9 - 23 mg/dL    Creatinine 8.50 (H) 0.60 - 1.30 mg/dL    Sodium 142 132 - 146 mmol/L    Potassium 4.8 3.5 - 5.5 mmol/L    Chloride 118 (H) 99 - 109 mmol/L    CO2 10.0 (L) 20.0 - 31.0 mmol/L    Calcium 7.1 (L) 8.7 - 10.4 mg/dL    Albumin 2.73 (L) 3.20 - 4.80 g/dL    Phosphorus 12.2 (H) 2.4 - 5.1 mg/dL    Anion Gap 14.0 (H) 3.0 - 11.0 mmol/L    BUN/Creatinine Ratio 15.4 7.0 - 25.0    eGFR Non African Amer 6 (L) >60 mL/min/1.73   Protime-INR   Result Value Ref Range    Protime 15.2 (H) 11.2 - 14.3 Seconds    INR 1.26 (H) 0.85 - 1.16   Iron Profile   Result Value Ref Range    Iron 97 50 - 175 mcg/dL    TIBC 167 (L) 250 - 450 mcg/dL    Iron Saturation 58 (H) 20 - 50 %   Ferritin   Result Value Ref Range    Ferritin 135.00 22.00 - 322.00 ng/mL   Hepatitis Panel, Acute   Result Value Ref Range    Hepatitis B Surface Ag Non-Reactive Non-Reactive    Hep A IgM Non-Reactive Non-Reactive    Hep B C IgM Non-Reactive Non-Reactive    Hepatitis C Ab Non-Reactive Non-Reactive   CBC Auto Differential   Result Value Ref Range    WBC 9.19 3.50 - 10.80 10*3/mm3    RBC 2.93 (L) 4.20 - 5.76 10*6/mm3    Hemoglobin 8.9 (L) 13.1 - 17.5 g/dL    Hematocrit 28.4 (L) 38.9 - 50.9 %    MCV 96.9 80.0 - 99.0 fL    MCH 30.4 27.0 - 31.0 pg    MCHC 31.3 (L) 32.0 - 36.0 g/dL    RDW 16.1 (H) 11.3 - 14.5 %    RDW-SD 57.1 (H) 37.0 - 54.0 fl    MPV 10.7 6.0 - 12.0 fL     Platelets 190 150 - 450 10*3/mm3    Neutrophil % 85.2 (H) 41.0 - 71.0 %    Lymphocyte % 8.1 (L) 24.0 - 44.0 %    Monocyte % 5.5 0.0 - 12.0 %    Eosinophil % 1.0 0.0 - 3.0 %    Basophil % 0.2 0.0 - 1.0 %    Immature Grans % 0.3 0.0 - 0.6 %    Neutrophils, Absolute 7.83 1.50 - 8.30 10*3/mm3    Lymphocytes, Absolute 0.74 0.60 - 4.80 10*3/mm3    Monocytes, Absolute 0.51 0.00 - 1.00 10*3/mm3    Eosinophils, Absolute 0.09 0.00 - 0.30 10*3/mm3    Basophils, Absolute 0.02 0.00 - 0.20 10*3/mm3    Immature Grans, Absolute 0.03 0.00 - 0.05 10*3/mm3   Hemoglobin A1c   Result Value Ref Range    Hemoglobin A1C 5.00 4.80 - 5.60 %   Magnesium   Result Value Ref Range    Magnesium 1.9 1.3 - 2.7 mg/dL   aPTT   Result Value Ref Range    PTT 38.4 (H) 24.0 - 37.0 seconds   Urinalysis, Microscopic Only - Urine, Clean Catch   Result Value Ref Range    RBC, UA 3-6 (A) None Seen, 0-2 /HPF    WBC, UA 3-5 (A) None Seen, 0-2 /HPF    Bacteria, UA None Seen None Seen, Trace /HPF    Squamous Epithelial Cells, UA 0-2 None Seen, 0-2 /HPF    Hyaline Casts, UA 0-6 0 - 6 /LPF    Methodology Automated Microscopy    Iron Profile   Result Value Ref Range    Iron 56 50 - 175 mcg/dL    TIBC 163 (L) 250 - 450 mcg/dL    Iron Saturation 34 20 - 50 %   CBC (No Diff)   Result Value Ref Range    WBC 9.29 3.50 - 10.80 10*3/mm3    RBC 2.89 (L) 4.20 - 5.76 10*6/mm3    Hemoglobin 9.0 (L) 13.1 - 17.5 g/dL    Hematocrit 27.3 (L) 38.9 - 50.9 %    MCV 94.5 80.0 - 99.0 fL    MCH 31.1 (H) 27.0 - 31.0 pg    MCHC 33.0 32.0 - 36.0 g/dL    RDW 15.8 (H) 11.3 - 14.5 %    RDW-SD 55.6 (H) 37.0 - 54.0 fl    MPV 10.9 6.0 - 12.0 fL    Platelets 179 150 - 450 10*3/mm3   Basic Metabolic Panel   Result Value Ref Range    Glucose 136 (H) 70 - 100 mg/dL    BUN 78 (H) 9 - 23 mg/dL    Creatinine 6.00 (H) 0.60 - 1.30 mg/dL    Sodium 139 132 - 146 mmol/L    Potassium 3.3 (L) 3.5 - 5.5 mmol/L    Chloride 109 99 - 109 mmol/L    CO2 16.0 (L) 20.0 - 31.0 mmol/L    Calcium 7.0 (L) 8.7 - 10.4  mg/dL    eGFR Non African Amer 9 (L) >60 mL/min/1.73    BUN/Creatinine Ratio 13.0 7.0 - 25.0    Anion Gap 14.0 (H) 3.0 - 11.0 mmol/L   POC Glucose Once   Result Value Ref Range    Glucose 135 (H) 70 - 130 mg/dL   POC Glucose Once   Result Value Ref Range    Glucose 130 70 - 130 mg/dL   POC Glucose Once   Result Value Ref Range    Glucose 117 70 - 130 mg/dL   Light Blue Top   Result Value Ref Range    Extra Tube hold for add-on    Green Top (Gel)   Result Value Ref Range    Extra Tube Hold for add-ons.    Lavender Top   Result Value Ref Range    Extra Tube hold for add-on    Gold Top - SST   Result Value Ref Range    Extra Tube Hold for add-ons.      Kevin was seen today for follow-up, prediabetes, hypertension, hypothyroidism, hyperlipidemia and chronic kidney disease.    Diagnoses and all orders for this visit:    Prediabetes  -     POC Glycosylated Hemoglobin (Hb A1C)  -     POC Glucose Fingerstick      Problem List Items Addressed This Visit        Cardiovascular and Mediastinum    Mixed hyperlipidemia     Check flp          Relevant Orders    Lipid Panel    Benign essential hypertension     bp is high today- continue to monitor and adjust medication on dialysis- they are planning to use larger filter (having problems getting dialysis adjusted)              Endocrine    Type 2 diabetes mellitus without complication, with long-term current use of insulin (CMS/[facility])     Blood sugar and 90 day average sugar reviewed  Results for orders placed or performed in visit on 03/28/19   POC Glycosylated Hemoglobin (Hb A1C)   Result Value Ref Range    Hemoglobin A1C 5.4 %   POC Glucose Fingerstick   Result Value Ref Range    Glucose 103 70 - 130 mg/dL     S/p gastric bypass   occ low sugar- discussed frequent small meals (nausea with full meal)   Continue to monitor diet and stay active         Acquired hypothyroidism     Low thyroid function- last tsh 10/18  High- repeat today            Other Visit Diagnoses     Prediabetes     -  Primary    Relevant Orders    POC Glycosylated Hemoglobin (Hb A1C) (Completed)    POC Glucose Fingerstick (Completed)    Lipid Panel    T4, Free    TSH        Return in about 4 months (around 7/28/2019) for Recheck 30 min .    Valentina Rodriguez MA  Signed Anuradha Charles MD

## 2019-03-28 NOTE — ASSESSMENT & PLAN NOTE
Blood sugar and 90 day average sugar reviewed  Results for orders placed or performed in visit on 03/28/19   POC Glycosylated Hemoglobin (Hb A1C)   Result Value Ref Range    Hemoglobin A1C 5.4 %   POC Glucose Fingerstick   Result Value Ref Range    Glucose 103 70 - 130 mg/dL     S/p gastric bypass   occ low sugar- discussed frequent small meals (nausea with full meal)   Continue to monitor diet and stay active

## 2019-03-28 NOTE — ASSESSMENT & PLAN NOTE
bp is high today- continue to monitor and adjust medication on dialysis- they are planning to use larger filter (having problems getting dialysis adjusted)

## 2019-03-29 NOTE — TELEPHONE ENCOUNTER
I would increase synthroid to 0.137 mg daily and repeat blood work in 8 weeks.  If he forgets dosing he can take 7 pills once a week.   Thanks, bety

## 2019-04-01 NOTE — TELEPHONE ENCOUNTER
Pt had numerous questions about the elevated TSH level and repeating labs in 8 weeks.  He is trying to schedule his kidney transplant within the next 4 weeks and also dialysis is recommending changing the site if it will be longer than 4 weeks and they can retest the TSH in 4 weeks.  He was advised he can have it tested in 4 weeks but it may not be down enough to be cleared for surgery and Dr Charles recommendation is to repeat in 6 weeks  All questions were answered and pt voiced understanding

## 2019-04-01 NOTE — TELEPHONE ENCOUNTER
Please call pt he has questions about the med that was increased. He also said something about surgery. He wanted to be called today [phone]

## 2019-04-04 NOTE — TELEPHONE ENCOUNTER
[facility] [phone]  No DVT  Pt does have 17.0 by 1.5 cm complex cystic mass right fossa. Caller has been transferred to PCP [phone] [id number].

## 2019-04-04 NOTE — PROGRESS NOTES
Chief Complaint   Patient presents with   • Edema     Lower right leg    • Mass     Behind right knee - 1 week        Subjective       History of Present Illness     Kevin Aguero is a 63 y.o. male. He presents with 10 day history of R lower leg swelling, and tenderness/ possible mass behind R knee. Pt states this began with mild swelling, has worsened slightly. A few days ago he noted additional localized swelling at back of calf/ behind R knee. He has tenderness in this area. He has not noted warmth or redness at this area, although states that lower leg looks a little more red than left in general the last few days. He denies any recent trauma or falls. No hx DVT or PE.   Of note, pt recently began dialysis 3 days/ week, began about 3 weeks ago. He is also on Lasix for fluid retention. Since beginning dialysis, pt states his legs were swelling less until this recent episode which is R-sided only. He states they initially took 17L water off when he began dialysis.   Pt awaiting kidney transplant, and his wife will be living donor of kidney. Hopes to have transplant in the next 6 weeks as they are well advanced into final stages of transplant prep.       The following portions of the patient's history were reviewed and updated as appropriate: allergies, current medications, past family history, past medical history, past social history, past surgical history and problem list.    Allergies   Allergen Reactions   • Sulfa Antibiotics Other (See Comments)     Patient does not remember the reaction symptoms     Social History     Tobacco Use   • Smoking status: Never Smoker   • Smokeless tobacco: Never Used   Substance Use Topics   • Alcohol use: Yes     Alcohol/week: 0.6 oz     Types: 1 Cans of beer per week     Comment: once per month         Current Outpatient Medications:   •  allopurinol (ZYLOPRIM) 100 MG tablet, TAKE 1 TABLET BY MOUTH  DAILY FOR GOUT, Disp: 90 tablet, Rfl: 0  •  amLODIPine (NORVASC) 10 MG tablet,  Take 1 tablet by mouth Daily., Disp: , Rfl:   •  atorvastatin (LIPITOR) 20 MG tablet, Take 1 tablet by mouth Daily., Disp: 90 tablet, Rfl: 1  •  buPROPion (WELLBUTRIN) 100 MG tablet, TAKE 1 TABLET BY MOUTH 3  TIMES A DAY, Disp: 270 tablet, Rfl: 2  •  BYSTOLIC 10 MG tablet, TAKE 1 TABLET BY MOUTH  DAILY, Disp: 90 tablet, Rfl: 0  •  calcitriol (ROCALTROL) 0.25 MCG capsule, Take 1 capsule by mouth Daily., Disp: , Rfl:   •  calcium acetate (PHOS BINDER,) 667 MG capsule capsule, Take 1 capsule by mouth 3 (Three) Times a Day. (Patient taking differently: Take 1,334 mg by mouth 3 (Three) Times a Day.), Disp: 180 capsule, Rfl:   •  doxazosin (CARDURA) 2 MG tablet, Take 1 tablet by mouth 2 (Two) Times a Day., Disp: 180 tablet, Rfl: 1  •  furosemide (LASIX) 40 MG tablet, , Disp: , Rfl:   •  gabapentin (NEURONTIN) 800 MG tablet, Take 0.5 tablets by mouth Daily., Disp: , Rfl:   •  glucose blood (ONE TOUCH ULTRA TEST) test strip, 1 each by Other route Daily. TEST; For: Diabetes mellitus, Disp: 50 each, Rfl: 5  •  Insulin Pen Needle (B-D ULTRAFINE III SHORT PEN) 31G X 8 MM misc, 4 (four) times a day. USE qid; For: Diabetes mellitus, Disp: , Rfl:   •  levothyroxine (SYNTHROID, LEVOTHROID) 137 MCG tablet, Take 1 tablet by mouth Daily., Disp: 90 tablet, Rfl: 1  •  ONETOUCH DELICA LANCETS 33G misc, Use for blood sugar testing twice daily; For: Benign essential hypertension, Disp: , Rfl:   •  potassium chloride ER (K-TAB) 20 MEQ tablet controlled-release ER tablet, Take 1 tablet by mouth Daily. (Take if you take Lasix), Disp: 60 tablet, Rfl:   •  Unable to find, Take 2 each by mouth 2 (Two) Times a Day. MONI PACK 4 CAPSULES IN PACK (MULTIVITAMIN) OTC,  TAKE 2 CAPSULES BID, Disp: , Rfl:   •  venlafaxine (EFFEXOR) 75 MG tablet, Take 0.5 tablets by mouth 2 (Two) Times a Day., Disp: 45 tablet, Rfl: 3    Current Facility-Administered Medications:   •  cyanocobalamin injection 1,000 mcg, 1,000 mcg, Intramuscular, Q28 Days, Gilson Gates  MD COURTNEY, 1,000 mcg at 03/26/19 1447    Review of Systems   Constitutional: Positive for fatigue. Negative for chills and fever.   HENT: Negative for sore throat.    Respiratory: Negative for cough and shortness of breath.    Cardiovascular: Positive for leg swelling. Negative for chest pain.   Gastrointestinal: Negative for abdominal pain, diarrhea, nausea and vomiting.   Genitourinary: Positive for dysuria (on dialysis).   Musculoskeletal: Positive for myalgias. Negative for back pain.   Neurological: Negative for dizziness and headache.       Objective   Vitals:    04/04/19 1043   BP: 142/78   Pulse: 67   Resp: 18   Temp: 98 °F (36.7 °C)   SpO2: 98%     Physical Exam   Constitutional: He appears well-developed and well-nourished.   HENT:   Mouth/Throat: Oropharynx is clear and moist.   Eyes: Conjunctivae are normal.   Neck: Normal range of motion. Neck supple.   Cardiovascular: Normal rate, regular rhythm and normal heart sounds.   No murmur heard.  Pulmonary/Chest: Effort normal and breath sounds normal. He has no wheezes. He has no rales.   Musculoskeletal:        Right knee: He exhibits normal range of motion and no swelling.        Right lower leg: He exhibits tenderness, swelling and edema. He exhibits no bony tenderness.        Legs:  +asymmetric localized swelling at posterior knee joint, extending distally into posterior calf   Skin: Skin is warm and dry.             Assessment/Plan   Kevin was seen today for edema and mass.    Diagnoses and all orders for this visit:    Pain and swelling of right lower leg  -     D-dimer, Quantitative  -     Basic Metabolic Panel  -     Duplex Venous Lower Extremity - Right CAR; Future    Localized warmth of skin  -     D-dimer, Quantitative  -     Basic Metabolic Panel  -     Duplex Venous Lower Extremity - Right CAR; Future      Will send for STAT venous duplex for possible DVT.   Further plans after review of labs and imaging. Discussed that I will speak with his  nephrologist, Dr. Green, if he does have DVT as he will need to initiate an anticoagulant. Will await results before consulting with nephro.              Return for Next scheduled follow up.

## 2019-04-04 NOTE — TELEPHONE ENCOUNTER
Spoke to pt regarding venous doppler results. Informed of no DVT present on US. Informed of large cyst, possibly Baker's cyst, at posterior knee/ calf. Advised I will send to ortho for further evaluation and management, which may include drainage, injections, surgical removal, or watchful waiting. Pt in agreement with this plan for ortho referral.

## 2019-04-09 NOTE — TELEPHONE ENCOUNTER
----- Message from Sarah Tavarez sent at 4/9/2019 10:00 AM EDT -----  [phone]  REFILL ON BYSTOLIC 10 MG, ATORVASTATIN 20 MG AND ALLOPURINOL 100 MG  90 DAY SUPPLY   [facility]

## 2019-04-16 NOTE — PROGRESS NOTES
Procedure   Large Joint Arthrocentesis: R knee  Date/Time: 4/16/2019 9:11 AM  Consent given by: patient  Site marked: site marked  Timeout: Immediately prior to procedure a time out was called to verify the correct patient, procedure, equipment, support staff and site/side marked as required   Supporting Documentation  Indications: pain   Procedure Details  Location: knee - R knee  Preparation: Patient was prepped and draped in the usual sterile fashion  Needle size: 22 G  Approach: anterolateral  Medications administered: 80 mg triamcinolone acetonide 40 MG/ML; 3 mL bupivacaine 0.25 % (Lidocaine 1% NDC: 2478-9970-65; LOT: -DK; EXP: 05/01/2020)  Patient tolerance: patient tolerated the procedure well with no immediate complications

## 2019-04-16 NOTE — PROGRESS NOTES
Orthopaedic Clinic Note: Knee New Patient    Chief Complaint   Patient presents with   • Right Knee - Pain        HPI  Consult from: AMBROSE Stephen Laci is a 63 y.o. male who presents with right knee pain for 5 week(s). Onset has been atraumatic and gradual in nature.  Pain is localized to the popliteal fossa region radiating down the leg to the calf region.  Rates it 4/10 on pain scale.  Worse with walking and climbing stairs.  He is complaining of swelling and stiffness in the knee as well as a constant dull ache.  He denies any mechanical symptoms.  He is here today to discuss treatment options.  He does have a prior venous duplex study that demonstrates a Baker's cyst within the popliteal fossa.  He is here to discuss treatment options.  Patient is in kidney failure and is currently taking dialysis.  He is in the process of undergoing kidney transplant within the next couple months.  Past Medical History:   Diagnosis Date   • Allergy    • Anemia    • Arthritis    • Breast pain, left    • Callus    • Cough 11/13/2017   • Depression    • Diabetes education, encounter for 10/2014    Description: Camron 10/14 BDR  Last Impression: [id number]  refer for help with compulsive eating  Anuradha Charles (Endocrinology)   • Disease of jaw 8/15/2016   • Gout 5/6/2016   • Hematuria 8/15/2016   • Hyperkalemia 8/15/2016   • Hyperlipidemia    • Hypertension    • Hypothyroidism    • Left arm pain    • Low back pain    • Obesity    • Pain in joint of left shoulder    • Primary osteoarthritis involving multiple joints 5/6/2016    Impression: 09/10/2015 - discussed weight loss;    • Proteinuria     sees nephrology- 12 gm of proteincould not do biopsy due to size- nephrology following.   • Renal insufficiency    • Shoulder joint pain 11/14/2016   • Sleep apnea with use of continuous positive airway pressure (CPAP)    • Stage 5 chronic kidney disease (CMS/HCC)       Past Surgical History:   Procedure  Laterality Date   • COLONOSCOPY      2018   • INSERTION HEMODIALYSIS CATHETER Right 2/27/2019    Procedure: HEMODIALYSIS CATHETER INSERTION;  Surgeon: Arnav Sandhu MD;  Location: [facility] [place];  Service: General   • ROTATOR CUFF REPAIR     • CALE-EN-Y  06/26/2017   • TOOTH EXTRACTION        Family History   Problem Relation Age of Onset   • No Known Problems Mother    • Diabetes Father    • Hypertension Father    • Cancer Father      Social History     Socioeconomic History   • Marital status:      Spouse name: Not on file   • Number of children: Not on file   • Years of education: Not on file   • Highest education level: Not on file   Tobacco Use   • Smoking status: Never Smoker   • Smokeless tobacco: Never Used   Substance and Sexual Activity   • Alcohol use: Yes     Alcohol/week: 0.6 oz     Types: 1 Cans of beer per week     Comment: once per month   • Drug use: No   • Sexual activity: Defer      Current Outpatient Medications on File Prior to Visit   Medication Sig Dispense Refill   • allopurinol (ZYLOPRIM) 100 MG tablet Take 1 tablet by mouth Daily. 90 tablet 2   • amLODIPine (NORVASC) 10 MG tablet Take 1 tablet by mouth Daily.     • atorvastatin (LIPITOR) 20 MG tablet Take 1 tablet by mouth Daily. 90 tablet 1   • buPROPion (WELLBUTRIN) 100 MG tablet TAKE 1 TABLET BY MOUTH 3  TIMES A  tablet 2   • calcitriol (ROCALTROL) 0.25 MCG capsule Take 1 capsule by mouth Daily.     • calcium acetate (PHOS BINDER,) 667 MG capsule capsule Take 1 capsule by mouth 3 (Three) Times a Day. (Patient taking differently: Take 1,334 mg by mouth 3 (Three) Times a Day.) 180 capsule    • doxazosin (CARDURA) 2 MG tablet Take 1 tablet by mouth 2 (Two) Times a Day. 180 tablet 1   • furosemide (LASIX) 40 MG tablet      • gabapentin (NEURONTIN) 800 MG tablet Take 0.5 tablets by mouth Daily.     • glucose blood (ONE TOUCH ULTRA TEST) test strip 1 each by Other route Daily. TEST; For: Diabetes mellitus 50 each 5   •  Insulin Pen Needle (B-D ULTRAFINE III SHORT PEN) 31G X 8 MM misc 4 (four) times a day. USE qid; For: Diabetes mellitus     • levothyroxine (SYNTHROID, LEVOTHROID) 137 MCG tablet Take 1 tablet by mouth Daily. 90 tablet 1   • nebivolol (BYSTOLIC) 10 MG tablet Take 1 tablet by mouth Daily. 90 tablet 2   • ONETOUCH DELICA LANCETS 33G misc Use for blood sugar testing twice daily; For: Benign essential hypertension     • potassium chloride ER (K-TAB) 20 MEQ tablet controlled-release ER tablet Take 1 tablet by mouth Daily. (Take if you take Lasix) 60 tablet    • Unable to find Take 2 each by mouth 2 (Two) Times a Day. MONI PACK 4 CAPSULES IN PACK (MULTIVITAMIN) OTC,  TAKE 2 CAPSULES BID     • venlafaxine (EFFEXOR) 75 MG tablet Take 0.5 tablets by mouth 2 (Two) Times a Day. 45 tablet 3     Current Facility-Administered Medications on File Prior to Visit   Medication Dose Route Frequency Provider Last Rate Last Dose   • cyanocobalamin injection 1,000 mcg  1,000 mcg Intramuscular Q28 Days Gilson Gates MD   1,000 mcg at 03/26/19 1447      Allergies   Allergen Reactions   • Sulfa Antibiotics Other (See Comments)     Patient does not remember the reaction symptoms        Review of Systems   Constitutional: Negative.         Night sweats     HENT: Negative.    Eyes: Negative.    Respiratory: Positive for apnea.    Cardiovascular: Negative.    Gastrointestinal: Negative.    Endocrine: Positive for cold intolerance.   Genitourinary: Positive for decreased urine volume and difficulty urinating.   Musculoskeletal: Positive for arthralgias.   Skin: Negative.    Allergic/Immunologic: Positive for environmental allergies.   Neurological: Negative.    Hematological: Bruises/bleeds easily.   Psychiatric/Behavioral: Negative.         The following portions of the patient's history were reviewed and updated as appropriate: allergies, current medications, past family history, past medical history, past social history, past surgical  "history and problem list.    Physical Exam  Pulse 88, height 182.9 cm (72.01\"), weight 102 kg (224 lb 13.9 oz), SpO2 99 %.    Body mass index is 30.49 kg/m².    GENERAL APPEARANCE: awake, alert & oriented x 3, in no acute distress and well developed, well nourished  PSYCH: normal affect  LUNGS:  breathing nonlabored  EYES: sclera anicteric  CARDIOVASCULAR: palpable dorsalis pedis, palpable posterior tibial bilaterally. Capillary refill less than 2 seconds  EXTREMITIES: no clubbing, cyanosis  GAIT:  Antalgic            Right Lower Extremity Exam:   ----------  Hip Exam  ----------  FLEXION CONTRACTURE: None  FLEXION: 110 degrees  INTERNAL ROTATION: 20 degrees at 90 degrees of flexion   EXTERNAL ROTATION: 40 degrees at 90 degrees of flexion    PAIN WITH HIP MOTION: no  ----------  Knee Exam  ----------  ALIGNMENT: neutral, no varus or valgus deformity     RANGE OF MOTION:  Normal (0-120 degrees) with no extensor lag or flexion contracture  LIGAMENTOUS STABILITY:   stable to varus and valgus stress at 0 and 30 degrees without any evidence of laxity     STRENGTH:  5/5 knee flexion, extension. 5/5 ankle dorsiflexion and plantarflexion.     PAIN WITH PALPATION: denies tenderness to palpation about the knee, denies medial or lateral joint line pain  KNEE EFFUSION: no  PAIN WITH KNEE ROM: no  PATELLAR CREPITUS: yes, asymptomatic  SPECIAL EXAM FINDINGS:  Negative patellar compression, small palpable Baker's cyst posteriorly    REFLEXES:  PATELLAR 2+/4  ACHILLES 2+/4    CLONUS: negative  STRAIGHT LEG TEST:   negative    SENSATION TO LIGHT TOUCH:  DEEP PERONEAL/SUPERFICIAL PERONEAL/SURAL/SAPHENOUS/TIBIAL:   intact    EDEMA: 2+ pitting edema to proximal tibia  ERYTHEMA:  no  WOUNDS/INCISIONS: none, no overlying skin problems.      Left Lower Extremity Exam:   ----------  Hip Exam  ----------  FLEXION CONTRACTURE: None  FLEXION: 110 degrees  INTERNAL ROTATION: 20 degrees at 90 degrees of flexion   EXTERNAL ROTATION: 40 degrees " at 90 degrees of flexion    PAIN WITH HIP MOTION: no  ----------  Knee Exam  ----------  ALIGNMENT: neutral, no varus or valgus deformity     RANGE OF MOTION:  Normal (0-120 degrees) with no extensor lag or flexion contracture  LIGAMENTOUS STABILITY:   stable to varus and valgus stress at 0 and 30 degrees without any evidence of laxity     STRENGTH:  5/5 knee flexion, extension. 5/5 ankle dorsiflexion and plantarflexion.     PAIN WITH PALPATION: denies tenderness to palpation about the knee, denies medial or lateral joint line pain  KNEE EFFUSION: no  PAIN WITH KNEE ROM: no  PATELLAR CREPITUS: no  SPECIAL EXAM FINDINGS:  Negative patellar compression    REFLEXES:  PATELLAR 2+/4  ACHILLES 2+/4    CLONUS: negative  STRAIGHT LEG TEST:   negative    SENSATION TO LIGHT TOUCH:  DEEP PERONEAL/SUPERFICIAL PERONEAL/SURAL/SAPHENOUS/TIBIAL:   intact    EDEMA:  no  ERYTHEMA:  no  WOUNDS/INCISIONS: none, no overlying skin problems.    ______________________________________________________________________  ______________________________________________________________________    RADIOGRAPHIC FINDINGS:   Indication: Right knee pain    Comparison: No prior xrays are available for comparison    Left knee(s) 4 views: Mild to moderate tricompartmental arthritic changes with small periarticular osteophytes visualized in all compartments.  No acute bony injury or fracture.      Assessment/Plan:   Diagnosis Plan   1. Primary osteoarthritis of right knee  Large Joint Arthrocentesis: R knee   2. Right knee pain, unspecified chronicity  XR Knee 4+ View Right   3. Baker's cyst of knee, right     4. Lymphedema of right lower extremity       Patient symptoms are consistent with osteoarthritis of the right knee resulting in an enlarged Baker's cyst.  I explained that we treat this by treating the intra-articular inflammation and fluid accumulation within the knee joint.  I recommended a cortisone injection in the right knee.  He is agreeable to  this.  In regards to his lymphedema, I recommended compression stockings to decrease swelling.  A list a local retail stores with these can be purchased were provided.  He will follow-up in 3 months.    Procedure Note:  I discussed with the patient the potential benefits of performing a therapeutic injection of the right knee as well as potential risks including but not limited to infection, swelling, pain, bleeding, bruising, nerve/vessel damage, skin color changes, transient elevation in blood glucose levels, and fat atrophy. After informed consent and after the area was prepped with alcohol, ethyl chloride was used to numb the skin. Via the superolateral approach, 3cc of 1% lidocaine, 3cc of 0.25% marcaine and 2 cc of 40mg/ml of Kenalog were injected into the right knee. The patient tolerated the procedure well. There were no complications. A sterile dressing was placed over the injection site.      Geronimo Esquivel MD  04/16/19  9:14 AM

## 2019-04-17 NOTE — TELEPHONE ENCOUNTER
Patient called in regards to medication increase potential side effects. During his last visit increased dose of levothyroxine. Since this visit patient has been experiencing night sweats. Patient asked if there a correlation in the two.

## 2019-04-25 PROBLEM — E78.2 MIXED HYPERLIPIDEMIA: Status: ACTIVE | Noted: 2019-01-01

## 2019-04-25 PROBLEM — K40.90 RIGHT INGUINAL HERNIA: Status: ACTIVE | Noted: 2019-01-01

## 2019-04-25 PROBLEM — D72.829 LEUKOCYTOSIS: Status: RESOLVED | Noted: 2019-01-01 | Resolved: 2019-01-01

## 2019-04-25 PROBLEM — E78.2 MIXED HYPERLIPIDEMIA: Status: RESOLVED | Noted: 2019-01-01 | Resolved: 2019-01-01

## 2019-04-25 PROBLEM — N18.5 STAGE 5 CHRONIC KIDNEY DISEASE NOT ON CHRONIC DIALYSIS (HCC): Status: RESOLVED | Noted: 2018-10-10 | Resolved: 2019-01-01

## 2019-04-25 PROBLEM — I10 HTN (HYPERTENSION): Status: RESOLVED | Noted: 2019-01-01 | Resolved: 2019-01-01

## 2019-04-25 PROBLEM — E87.20 METABOLIC ACIDOSIS: Status: RESOLVED | Noted: 2019-01-01 | Resolved: 2019-01-01

## 2019-04-25 NOTE — PROGRESS NOTES
Subjective   Kevin Aguero is a 63 y.o. male.     History of Present Illness   For follow up of  End stage renal disease.  He is currently being hemodialyzed 3x weekly via a port.  He is scheduled to receive a live kidney transplant from his wife in June.  HTN on meds, no chest pain, sob or headache.  IDDM is doing well.  Hypothyroid on replacement is fine.  Back pain is stable.      The following portions of the patient's history were reviewed and updated as appropriate: allergies, current medications, past medical history and problem list.    Review of Systems   Constitutional: Negative.  Negative for fatigue and fever.   Respiratory: Negative.  Negative for cough, chest tightness, shortness of breath and wheezing.    Cardiovascular: Negative.  Negative for chest pain, palpitations and leg swelling.   Gastrointestinal: Negative.  Negative for abdominal distention and abdominal pain.   Genitourinary: Negative.        Objective   Physical Exam   Constitutional: He appears well-developed and well-nourished.   Neck: Normal range of motion.   Cardiovascular: Normal rate, regular rhythm and normal heart sounds. Exam reveals no gallop and no friction rub.   No murmur heard.  Pulmonary/Chest: Effort normal and breath sounds normal. No respiratory distress. He has no wheezes. He has no rales. He exhibits no tenderness.   Abdominal: Soft. Bowel sounds are normal. A hernia (has a fairly large right inguinal hernia, is not tender..) is present.   Musculoskeletal: He exhibits no edema.   Neurological: He is alert.   Nursing note and vitals reviewed.        Assessment/Plan   Kevin was seen today for follow-up.    Diagnoses and all orders for this visit:    Benign essential hypertension  Stable, no change.    Mixed hyperlipidemia  Stable, no change.    ESRD (end stage renal disease) on dialysis (CMS/[facility])  Stable, awaiting transplant.    Anemia of chronic renal failure, stage 5 (CMS/HCC)  Stable, no change.    Other orders  -      allopurinol (ZYLOPRIM) 100 MG tablet; Take 1 tablet by mouth Daily.  -     doxazosin (CARDURA) 2 MG tablet; Take 1 tablet by mouth 2 (Two) Times a Day.    All problems are stable.  Same meds.  Recheck here 6 months.

## 2019-05-08 NOTE — TELEPHONE ENCOUNTER
----- Message from Ana Diana sent at 5/8/2019  1:09 PM EDT -----  Kevin called and states he was diagnosed with a hernia at his last office visit with Dr Gates. He is requesting a referral to a surgeon to have it removed prior to his kidney transplant. He would like your opinion on the matter as well. Pt said the recovery for hernia surgery is only about 6 weeks, and he could possibly be fully recovered prior to the transplant surgery.    Kevin [phone]    Thank you.

## 2019-05-23 NOTE — ANESTHESIA POSTPROCEDURE EVALUATION
Patient: Kevin Aguero    Procedure Summary     Date:  05/23/19 Room / Location:   [facility] / [facility]    Anesthesia Start:  0927 Anesthesia Stop:      Procedure:  RIGHT INGUINAL HERNIA REPAIR WITH MESH (Right Abdomen) Diagnosis:      Surgeon:  Troy Vega MD Provider:  Chuck Randall MD    Anesthesia Type:  general with block ASA Status:  3          Anesthesia Type: general with block  Last vitals  BP   156/80 (05/23/19 1101)   Temp   97.6 °F (36.4 °C) (05/23/19 1101)   Pulse   56 (05/23/19 1101)   Resp   16 (05/23/19 1058)     SpO2   100 % (05/23/19 1101)     Post Anesthesia Care and Evaluation    Patient location during evaluation: PACU  Patient participation: complete - patient participated  Level of consciousness: awake and alert  Pain score: 0  Pain management: adequate  Airway patency: patent  Anesthetic complications: No anesthetic complications  PONV Status: none  Cardiovascular status: hemodynamically stable and acceptable  Respiratory status: nonlabored ventilation and acceptable  Hydration status: acceptable

## 2019-05-23 NOTE — OP NOTE
INGUINAL HERNIA REPAIR  Procedure Note    Kevin Aguero  Date of Surgery:  5/23/2019    Pre-op Diagnosis:   Right inguinal hernia    Post-op Diagnosis:     Same    Operative Procedure:   Repair of right inguinal hernia with Prolene mesh    Indications:  Mr. Bee is a 63-year-old male with at least a several year history of a gradually enlarging right inguinal hernia.  Unfortunately  he has progressed to end-stage renal disease and is on dialysis.  He is due for a living related donor transplant later this summer.  I felt that it would be more appropriate to repair his hernia now prior to long-term immunosuppression.    Operative note:  On 5/23/2019, the patient was taken to the operating room and placed supine on the operating table.  Following adequate induction of general anesthesia, the abdomen was prepped and draped in the standard fashion.  A transverse incision was made in the right lower quadrant.  The subcutaneous tissue was divided with the cautery to expose the external oblique fascia.  The external oblique fascia was nicked with a 15 blade and then opened in the direction of its fibers through the external inguinal ring with the Metzenbaum scissors.  The nerve structures were identified and protected.  With some difficulty, the cord was bluntly mobilized from the inguinal canal and controlled with a Penrose drain.  The cord was carefully skeletonized, revealing a large scarred in thick-walled indirect inguinal hernia sac in the usual salinas-medial position of the cord.  The hernia sac was dissected free from the surrounding cord structures back to the internal ring.  The sac was opened and there were no contents.  The sac was amputated over a hemostat and the pedicle ligated with a 2-0 Vicryl.  The stump was allowed to retract up into the internal ring.  The patient also had a direct hernia.  This was smaller than the indirect.  A 3 inch x 6 inch sheet of Prolene mesh was brought into the field and  trimmed appropriately.  This was sewn across the top of the pubic tubercle and down the inguinal ligament with running 2-0 Novafil.  A slit was cut in the mesh and the mesh wrapped around the spermatic cord.  Medially and superiorly, the mesh was tacked to the underlying internal Bleich muscle and fascia with interrupted 2-0 Vicryl.  Laterally, the mesh was secured around the spermatic cord with interrupted 2-0 Novafil.  This provided a secure closure at the internal ring without compromising the spermatic vessels.  Hemostasis was excellent.  The spermatic cord was returned to the inguinal canal and the external Bleich fascia closed over this with running 3-0 Vicryl.  The subcutaneous tissue was reapproximated with interrupted 3-0 Vicryl, and the skin closed with a running 4-0 Vicryl subcuticular suture.  A sterile dressing was applied in the operating room.  The patient tolerated the procedure well and there were no complications.  Estimated blood loss is 10 to 15 mL's.  Both preoperative and postoperative sponge and needle counts were correct.  The patient was taken to the recovery room in satisfactory condition.    Troy Vega MD     Date: 5/23/2019  Time: 10:59 AM

## 2019-05-23 NOTE — H&P
[facility] Pre-op    Full history and physical note from office is up to date.  See office note attached.  CV:  +cardiac clearance    /85 (BP Location: Right arm, Patient Position: Lying)   Pulse 57   Temp 98.1 °F (36.7 °C) (Temporal)   Resp 18   SpO2 99%     LAB Results:  Lab Results   Component Value Date    WBC 9.29 03/01/2019    HGB 9.0 (L) 03/01/2019    HCT 27.3 (L) 03/01/2019    MCV 94.5 03/01/2019     03/01/2019    NEUTROABS 7.83 02/27/2019    GLUCOSE 104 (H) 04/04/2019    BUN 24 (H) 04/04/2019    CREATININE 3.48 (H) 04/04/2019    EGFRIFNONA 18 (L) 04/04/2019    EGFRIFAFRI 30 (L) 08/21/2017     04/04/2019    K 4.1 04/04/2019     04/04/2019    CO2 27.0 04/04/2019    MG 1.9 02/27/2019    PHOS 12.2 (H) 02/27/2019    CALCIUM 8.2 (L) 04/04/2019    ALBUMIN 2.73 (L) 02/27/2019    AST 20 02/26/2019    ALT 29 02/26/2019    BILITOT 0.2 (L) 02/26/2019       Cancer Staging (if applicable)  Cancer Patient: __ yes _x_no __unknown__N/A; If yes, clinical stage T:__ N:__M:__, stage group or __N/A    A/P:  Right inguinal hernia - Right inguinal hernia repair with mesh    Dennise Myers, APRN 5/23/2019 9:14 AM

## 2019-05-23 NOTE — ANESTHESIA PROCEDURE NOTES
Peripheral Block      Patient location during procedure: OR  Stop time: 5/23/2019 9:34 AM  Reason for block: at surgeon's request and post-op pain management  Performed by  Anesthesiologist: Chuck Randall MD  Preanesthetic Checklist  Completed: patient identified, site marked, surgical consent, pre-op evaluation, timeout performed, IV checked, risks and benefits discussed and monitors and equipment checked  Prep:  Pt Position: supine  Sterile barriers:cap, gloves, sterile barriers and mask  Prep: ChloraPrep  Patient monitoring: blood pressure monitoring, continuous pulse oximetry and EKG  Procedure  Sedation:yes  Performed under: general  Guidance:ultrasound guided  Images:still images obtained    Laterality:Bilateral  Block Type:TAP  Injection Technique:single-shot  Needle Type:short-bevel and echogenic  Needle Gauge:20 G    Medications Used: buprenorphine (BUPRENEX) injection, 0.15 mg  dexamethasone sodium phosphate injection, 2 mg  bupivacaine PF (MARCAINE) 0.25 % injection, 30 mL  Medications  Comment:Block Injection:  LA dose divided between Right and Left block       Adjuncts:  Decadron 4mg PSF, Buprenex 0.3mg (Per total volume of LA)    Post Assessment  Injection Assessment: negative aspiration for heme, incremental injection and no paresthesia on injection  Patient Tolerance:comfortable throughout block  Complications:no  Additional Notes      Under Ultrasound guidance, a BBraun 4inch 360 degree needle was advanced with Normal Saline hydro dissection of tissue.  The Internal Oblique and Transversus Abdominus muscles where visualized.  At or before the aponeurosis of Internal Oblique, local anesthetic spread was visualized in the Transversus Abdominus Plane. Injection was made incrementally with aspiration every 5 mls.  There was no  intravascular injection,  injection pressure was normal, there was no neural injection, and the procedure was completed without difficulty.  Thank You.

## 2019-05-23 NOTE — ANESTHESIA PREPROCEDURE EVALUATION
Anesthesia Evaluation     Patient summary reviewed and Nursing notes reviewed   NPO Solid Status: > 8 hours  NPO Liquid Status: > 8 hours           Airway   Mallampati: I  TM distance: >3 FB  Neck ROM: full  No difficulty expected  Dental      Pulmonary    (+) sleep apnea,   (-) COPD, asthma, shortness of breath, recent URI, not a smoker  Cardiovascular     (+) hypertension, hyperlipidemia,   (-) past MI, dysrhythmias, angina      Neuro/Psych  (-) seizures, CVA  GI/Hepatic/Renal/Endo    (+) obesity,  GERD,  renal disease (hemo dialysis) ESRD and dialysis, diabetes mellitus type 2, hypothyroidism,   (-) morbid obesityLiver disease: G BP.    Musculoskeletal     Abdominal    Substance History      OB/GYN          Other                        Anesthesia Plan    ASA 3     general with block   (TAPblock , Propofol infusion,  Opiate sparing  )  intravenous induction   Anesthetic plan, all risks, benefits, and alternatives have been provided, discussed and informed consent has been obtained with: patient.    Plan discussed with CRNA.

## 2019-05-23 NOTE — ANESTHESIA PROCEDURE NOTES
Airway  Urgency: elective    Airway not difficult    General Information and Staff    Patient location during procedure: OR  CRNA: Scott Lima CRNA    Indications and Patient Condition  Indications for airway management: airway protection    Preoxygenated: yes  Mask difficulty assessment: 1 - vent by mask    Final Airway Details  Final airway type: supraglottic airway      Successful airway: I-gel  Size 4    Number of attempts at approach: 1    Additional Comments  LMA placed without difficulty, ventilation with assist, equal breath sounds and symmetric chest rise and fall

## 2019-05-23 NOTE — BRIEF OP NOTE
INGUINAL HERNIA REPAIR  Progress Note    Kevin Aguero  5/23/2019    Pre-op Diagnosis:   Right inguinal hernia       Post-Op Diagnosis Codes:  Same    Procedure/CPT® Codes:      Procedure(s):  RIGHT INGUINAL HERNIA REPAIR WITH MESH    Surgeon(s):  Troy Vega MD    Anesthesia: General with Block    Staff:   Circulator: Viri Ovalle RN; Maeve Edwards RN  Scrub Person: Gabriel Arevalo  Nursing Assistant: Carol Miranda    Estimated Blood Loss: minimal    Urine Voided: * No values recorded between 5/23/2019  9:25 AM and 5/23/2019 10:56 AM *    Specimens:                None          Drains:      Findings: Large indirect hernia sac; smaller direct hernia    Complications: None      Troy Vega MD     Date: 5/23/2019  Time: 10:58 AM

## 2019-06-21 NOTE — TELEPHONE ENCOUNTER
PT. CALLED AND WANTED TO SEE IF  COULD ORDER SOME LABS TO HAVE HIS THYROID TESTED; HE HAS BEEN ON THE NEW DOSAGE OF LEVOTHYROXINE FOR 8 WEEKS; HE HAD TO POSTPONE HIS KIDNEY TRANSPLANT BECAUSE HIS THYROID LEVELS WERE LOW AND HE WOULD LIKE TO SEE ABOUT GETTING IT RESCHEDULED    PLEASE SEND REFILL OF ATORVASTATIN TO [facility] IN [place]

## 2019-07-16 NOTE — PROGRESS NOTES
Procedure   Large Joint Arthrocentesis: R knee  Date/Time: 7/16/2019 10:17 AM  Consent given by: patient  Site marked: site marked  Timeout: Immediately prior to procedure a time out was called to verify the correct patient, procedure, equipment, support staff and site/side marked as required   Supporting Documentation  Indications: pain   Procedure Details  Location: knee - R knee  Preparation: Patient was prepped and draped in the usual sterile fashion  Needle size: 22 G  Approach: anterolateral  Medications administered: 3 mL lidocaine PF 1% 1 %; 80 mg triamcinolone acetonide 40 MG/ML (3cc 0.25% Sensorcaine NDC: 92363-457-87 LOT: [id number] EXP:02/01/23)  Patient tolerance: patient tolerated the procedure well with no immediate complications

## 2019-07-16 NOTE — PROGRESS NOTES
Orthopaedic Clinic Note: Knee Established Patient    Chief Complaint   Patient presents with   • Follow-up     Primary osteoarthritis of right knee        HPI    It has been 3  month(s) since Mr. Aguero's last visit. He returns to clinic today for follow-up right knee osteoarthritis.  He received a cortisone injection 3 months ago.  The injection provided about an 11 weeks of relief.  His pain is gradually returned.  He rates it a 4/10 on the pain scale.  He is complaining of swelling and stiffness of the knee that is worse with weightbearing activities.  He denies fevers chills or constitutional symptoms.  Overall he is doing about the same.    Past Medical History:   Diagnosis Date   • Anemia    • Arthritis    • Callus    • Cough    • Depression    • Diabetes education, encounter for 10/2014    Description: Camron 10/14 BDR  Last Impression: [id number]  refer for help with compulsive eating  Anuradha Charles (Endocrinology)   • Dialysis patient (CMS/LTAC, located within [facility])    • GERD (gastroesophageal reflux disease)    • Gout    • History of gastric bypass     [facility]   • Hyperkalemia 8/15/2016   • Hyperlipidemia    • Hypertension    • Hypothyroidism    • Inguinal hernia     right    • Low back pain    • Primary osteoarthritis involving multiple joints 5/6/2016    Impression: 09/10/2015 - discussed weight loss;    • Proteinuria     sees nephrology- 12 gm of proteincould not do biopsy due to size- nephrology following.   • Seasonal allergies    • Shoulder joint pain 11/14/2016    left   • Sleep apnea with use of continuous positive airway pressure (CPAP)    • Stage 5 chronic kidney disease (CMS/LTAC, located within [facility])    • Wears glasses       Past Surgical History:   Procedure Laterality Date   • COLONOSCOPY      2018   • ENDOSCOPY     • INGUINAL HERNIA REPAIR Right 5/23/2019    Procedure: INGUINAL HERNIA REPAIR WITH MESH RIGHT;  Surgeon: Troy Vega MD;  Location: [facility];  Service: General   • INSERTION HEMODIALYSIS CATHETER Right  2/27/2019    Procedure: HEMODIALYSIS CATHETER INSERTION;  Surgeon: Arnav Sandhu MD;  Location: [facility];  Service: General   • ROTATOR CUFF REPAIR Left    • CALE-EN-Y  06/26/2017   • TOOTH EXTRACTION        Family History   Problem Relation Age of Onset   • No Known Problems Mother    • Diabetes Father    • Hypertension Father    • Cancer Father      Social History     Socioeconomic History   • Marital status:      Spouse name: Not on file   • Number of children: Not on file   • Years of education: Not on file   • Highest education level: Not on file   Tobacco Use   • Smoking status: Never Smoker   • Smokeless tobacco: Never Used   Substance and Sexual Activity   • Alcohol use: Yes     Alcohol/week: 0.6 oz     Types: 1 Cans of beer per week     Frequency: Never     Comment: once per month   • Drug use: No   • Sexual activity: Defer      Current Outpatient Medications on File Prior to Visit   Medication Sig Dispense Refill   • allopurinol (ZYLOPRIM) 100 MG tablet Take 1 tablet by mouth Daily. 90 tablet 3   • amLODIPine (NORVASC) 10 MG tablet Take 1 tablet by mouth Daily.     • atorvastatin (LIPITOR) 20 MG tablet Take 1 tablet by mouth Daily. 90 tablet 1   • buPROPion (WELLBUTRIN) 100 MG tablet TAKE 1 TABLET BY MOUTH 3  TIMES A  tablet 2   • calcitriol (ROCALTROL) 0.25 MCG capsule Take 1 capsule by mouth Daily.     • calcium acetate (PHOS BINDER,) 667 MG capsule capsule Take 1 capsule by mouth 3 (Three) Times a Day. (Patient taking differently: Take 1,334 mg by mouth 3 (Three) Times a Day.) 180 capsule    • cefdinir (OMNICEF) 300 MG capsule Take 1 capsule by mouth 2 (Two) Times a Day. 20 capsule 0   • doxazosin (CARDURA) 2 MG tablet Take 1 tablet by mouth 2 (Two) Times a Day. 180 tablet 3   • furosemide (LASIX) 40 MG tablet Take 40 mg by mouth Every Morning.     • gabapentin (NEURONTIN) 800 MG tablet Take 0.5 tablets by mouth Daily.     • glucose blood (ONE TOUCH ULTRA TEST) test strip 1 each  "by Other route Daily. TEST; For: Diabetes mellitus 50 each 5   • Insulin Pen Needle (B-D ULTRAFINE III SHORT PEN) 31G X 8 MM misc 4 (four) times a day. USE qid; For: Diabetes mellitus     • levothyroxine (SYNTHROID, LEVOTHROID) 137 MCG tablet Take 1 tablet by mouth Daily. (Patient taking differently: Take 137 mcg by mouth Every Morning.) 90 tablet 1   • nebivolol (BYSTOLIC) 10 MG tablet Take 1 tablet by mouth Daily. 90 tablet 2   • ONETOUCH DELICA LANCETS 33G misc Use for blood sugar testing twice daily; For: Benign essential hypertension     • potassium chloride ER (K-TAB) 20 MEQ tablet controlled-release ER tablet Take 1 tablet by mouth Daily. (Take if you take Lasix) 60 tablet    • Unable to find Take 2 each by mouth 2 (Two) Times a Day. MONI PACK 4 CAPSULES IN PACK (MULTIVITAMIN) OTC,  TAKE 2 CAPSULES BID     • venlafaxine (EFFEXOR) 75 MG tablet Take 0.5 tablets by mouth 2 (Two) Times a Day. 45 tablet 3     Current Facility-Administered Medications on File Prior to Visit   Medication Dose Route Frequency Provider Last Rate Last Dose   • cyanocobalamin injection 1,000 mcg  1,000 mcg Intramuscular Q28 Days Gilson Gates MD   1,000 mcg at 05/28/19 1608      Allergies   Allergen Reactions   • Sulfa Antibiotics Other (See Comments)     Patient does not remember the reaction symptoms        Review of Systems   Constitutional: Negative.    HENT: Negative.    Eyes: Negative.    Respiratory: Positive for apnea.    Cardiovascular: Negative.    Gastrointestinal: Negative.    Endocrine: Negative.    Genitourinary: Positive for decreased urine volume.   Musculoskeletal: Positive for joint swelling.   Skin: Negative.    Allergic/Immunologic: Positive for environmental allergies.   Neurological: Negative.    Hematological: Negative.    Psychiatric/Behavioral: Negative.         The patient's Review of Systems was personally reviewed and confirmed as accurate.    Physical Exam  Pulse 69, height 182.9 cm (72.01\"), weight " 87.7 kg (193 lb 5.5 oz), SpO2 97 %.    Body mass index is 26.22 kg/m².    GENERAL APPEARANCE: awake, alert, oriented, in no acute distress and well developed, well nourished  LUNGS:  breathing nonlabored  EXTREMITIES: no clubbing, cyanosis  PERIPHERAL PULSES: palpable dorsalis pedis and posterior tibial pulses bilaterally.    GAIT:  Antalgic        ----------  Right Knee Exam:  ----------  ALIGNMENT: neutral  ----------  RANGE OF MOTION:  Normal (0-120 degrees) with no extensor lag or flexion contracture  LIGAMENTOUS STABILITY:   stable to varus and valgus stress at terminal extension and 30 degrees without any evidence of laxity  ----------  STRENGTH:  KNEE FLEXION 5/5  KNEE EXTENSION  5/5  ANKLE DORSIFLEXION  5/5  ANKLE PLANTARFLEXION  5/5  ----------  PAIN WITH PALPATION:global  KNEE EFFUSION: yes, trace effusion  PAIN WITH KNEE ROM: yes  PATELLAR CREPITUS:  no  ----------  SENSATION TO LIGHT TOUCH:  DEEP PERONEAL/SUPERFICIAL PERONEAL/SURAL/SAPHENOUS/TIBIAL:    intact  ----------  EDEMA:  no  ERYTHEMA:    no  WOUNDS/INCISIONS:  no  _____________________________________________________________________  _____________________________________________________________________    RADIOGRAPHIC FINDINGS:   No new imaging today    Assessment/Plan:   Diagnosis Plan   1. Primary osteoarthritis of right knee  Large Joint Arthrocentesis: R knee     The patient has failed conservative treatment measures and is a candidate for total joint arthroplasty.  I discussed the total joint surgical process as well as the recovery and rehabilitation time frame.  The patient asked several questions regarding the total joint arthroplasty surgery, which were answered accordingly.  Ultimately, the patient declines surgical intervention at this time and wishes to continue with conservative treatment measures.  Alternative conservative treatment measures were discussed including bracing, therapy, topical/oral anti-inflammatories, activity  modification, and weight loss.  The patient considered these treatment options and wishes to proceed with corticosteroid injection(s) today.  Therefore we will proceed with corticosteroid injection(s) today.  Follow-up 3 months with repeat x-ray 4 views right knee.    Procedure Note:  I discussed with the patient the potential benefits of performing a therapeutic injection of the right knee as well as potential risks including but not limited to infection, swelling, pain, bleeding, bruising, nerve/vessel damage, skin color changes, transient elevation in blood glucose levels, and fat atrophy. After informed consent and after the area was prepped with alcohol, ethyl chloride was used to numb the skin. Via the superior lateral approach, 3cc of 1% lidocaine, 3cc of 0.25% marcaine and 2 cc of 40mg/ml of Kenalog were injected into the right knee. The patient tolerated the procedure well. There were no complications. A sterile dressing was placed over the injection site.        Geronimo Esqiuvel MD  07/16/19  10:24 AM

## 2019-08-06 NOTE — ASSESSMENT & PLAN NOTE
Is on lantus 10 u daily and humalog 4 units before meals and sliding scale   No low sugar  Is utd with eye exam  S/p renal transplant for nephropathy  Neuropathy with callus - foot care reviewed  Average sugar is good  Results for orders placed or performed in visit on 08/06/19   POC Glycosylated Hemoglobin (Hb A1C)   Result Value Ref Range    Hemoglobin A1C 6.2 %   POC Glucose Fingerstick   Result Value Ref Range    Glucose 190 (A) 70 - 130 mg/dL     Benefit of good control in protecting his kidney (new) discussed  F/u 3-4 months

## 2019-08-06 NOTE — PROGRESS NOTES
Kevin Aguero 64 y.o.  CC:Follow-up; Prediabetes; Hypertension; Hypothyroidism; Hyperlipidemia; and Chronic Kidney Disease (S/P op kidney transplant on 7/24/19 at [facility])      Ute:  Allergies   Allergen Reactions   • Sulfa Antibiotics Other (See Comments)     Patient does not remember the reaction symptoms       Current Outpatient Medications:   •  iron polysaccharides (NIFEREX) 150 MG capsule, Take 150 mg by mouth 2 (Two) Times a Day., Disp: , Rfl:   •  mycophenolate (CELLCEPT) 250 MG capsule, Take 500 mg by mouth 2 (Two) Times a Day., Disp: , Rfl:   •  allopurinol (ZYLOPRIM) 100 MG tablet, Take 1 tablet by mouth Daily., Disp: 90 tablet, Rfl: 3  •  amLODIPine (NORVASC) 10 MG tablet, Take 1 tablet by mouth Daily., Disp: , Rfl:   •  atorvastatin (LIPITOR) 20 MG tablet, Take 1 tablet by mouth Daily., Disp: 90 tablet, Rfl: 1  •  Blood Glucose Monitoring Suppl (ONE TOUCH ULTRA MINI) w/Device kit, , Disp: , Rfl:   •  buPROPion (WELLBUTRIN) 100 MG tablet, TAKE 1 TABLET BY MOUTH 3  TIMES A DAY, Disp: 270 tablet, Rfl: 2  •  dapsone 100 MG tablet, Once per week, Disp: , Rfl:   •  gabapentin (NEURONTIN) 800 MG tablet, Take 0.5 tablets by mouth Daily., Disp: , Rfl:   •  glucose blood (ONE TOUCH ULTRA TEST) test strip, 1 each by Other route Daily. TEST; For: Diabetes mellitus, Disp: 50 each, Rfl: 5  •  HUMALOG KWIKPEN 100 UNIT/ML solution pen-injector, Take 4 U plus SS TID, Disp: , Rfl:   •  Insulin Pen Needle (B-D ULTRAFINE III SHORT PEN) 31G X 8 MM misc, 4 (four) times a day. USE qid; For: Diabetes mellitus, Disp: , Rfl:   •  K Phos [place]-Sod Phos Di & Mono (PHOSPHA 250 NEUTRAL) 155-852-130 MG tablet, Take one tab BID, Disp: , Rfl:   •  LANTUS SOLOSTAR 100 UNIT/ML injection pen, Inject 10 units once per day, Disp: , Rfl:   •  levothyroxine (SYNTHROID, LEVOTHROID) 137 MCG tablet, Take 1 tablet by mouth Daily. (Patient taking differently: Take 137 mcg by mouth Every Morning.), Disp: 90 tablet, Rfl: 1  •  nebivolol (BYSTOLIC) 10  MG tablet, Take 1 tablet by mouth Daily., Disp: 90 tablet, Rfl: 2  •  NIFEdipine XL (PROCARDIA XL) 30 MG 24 hr tablet, 2 tabs BID, Disp: , Rfl:   •  ONETOUCH DELICA LANCETS 33G misc, Use for blood sugar testing twice daily; For: Benign essential hypertension, Disp: , Rfl:   •  predniSONE (DELTASONE) 5 MG tablet, Take 3 tabs per day, Disp: , Rfl:   •  raNITIdine (ZANTAC) 150 MG tablet, Take one tab BID, Disp: , Rfl:   •  tacrolimus (PROGRAF) 1 MG capsule, Take 4 caps BID, Disp: , Rfl:   •  Unable to find, Take 2 each by mouth 2 (Two) Times a Day. MONI PACK 4 CAPSULES IN PACK (MULTIVITAMIN) OTC,  TAKE 2 CAPSULES BID, Disp: , Rfl:   •  valGANciclovir (VALCYTE) 450 MG tablet, Take one daily, Disp: , Rfl:   •  venlafaxine (EFFEXOR) 75 MG tablet, Take 0.5 tablets by mouth 2 (Two) Times a Day., Disp: 45 tablet, Rfl: 3    Current Facility-Administered Medications:   •  cyanocobalamin injection 1,000 mcg, 1,000 mcg, Intramuscular, Q28 Days, Gilson Gates MD, 1,000 mcg at 07/18/19 1412  Patient Active Problem List    Diagnosis   • Right inguinal hernia [K40.90]   • OA (osteoarthritis) [M19.90]   • DM II (diabetes mellitus, type II), controlled (CMS/[facility]) [E11.9]   • ESRD (end stage renal disease) on dialysis (CMS/[facility]) [N18.6, Z99.2]   • Pre-transplant evaluation for kidney transplant [Z01.818]   • Anemia of chronic renal failure, stage 5 (CMS/[facility]) [N18.5, D63.1]   • Callus [L84]   • Onychomycosis [B35.1]   • Sciatica [M54.30]   • Benign essential hypertension [I10]   • Type 2 diabetes mellitus without complication, with long-term current use of insulin (CMS/[facility]) [E11.9, Z79.4]   • Diabetic retinopathy (CMS/[facility]) [E11.319]   • Edema [R60.9]   • Mixed hyperlipidemia [E78.2]   • Hypothyroidism [E03.9]   • Morbid obesity due to excess calories (CMS/HCC) [E66.01]   • Sleep apnea [G47.30]   • Cobalamin deficiency [E53.8]     Review of Systems  Social History     Socioeconomic History   • Marital status:      Spouse name:  "Not on file   • Number of children: Not on file   • Years of education: Not on file   • Highest education level: Not on file   Tobacco Use   • Smoking status: Never Smoker   • Smokeless tobacco: Never Used   Substance and Sexual Activity   • Alcohol use: Yes     Alcohol/week: 0.6 oz     Types: 1 Cans of beer per week     Frequency: Never     Comment: once per month   • Drug use: No   • Sexual activity: Defer     Family History   Problem Relation Age of Onset   • No Known Problems Mother    • Diabetes Father    • Hypertension Father    • Cancer Father      /60   Pulse 62   Ht 182.9 cm (72\")   Wt 92.4 kg (203 lb 9.6 oz)   SpO2 98%   BMI 27.61 kg/m²   Physical Exam  Results for orders placed or performed in visit on 08/06/19   POC Glycosylated Hemoglobin (Hb A1C)   Result Value Ref Range    Hemoglobin A1C 6.2 %   POC Glucose Fingerstick   Result Value Ref Range    Glucose 190 (A) 70 - 130 mg/dL     Kevin was seen today for follow-up, prediabetes, hypertension, hypothyroidism, hyperlipidemia and chronic kidney disease.    Diagnoses and all orders for this visit:    Type 2 diabetes mellitus without complication, with long-term current use of insulin (CMS/[facility])  -     POC Glycosylated Hemoglobin (Hb A1C)  -     POC Glucose Fingerstick      Valentina Rodriguez MA  Signed Anuradha Charles MD      "

## 2019-08-06 NOTE — PROGRESS NOTES
Kevin Aguero 64 y.o.  CC:Follow-up; Prediabetes; Hypertension; Hypothyroidism; Hyperlipidemia; and Chronic Kidney Disease (S/P op kidney transplant on 7/24/19 at [facility])      Native:Follow-up; Prediabetes; Hypertension; Hypothyroidism; Hyperlipidemia; and Chronic Kidney Disease (S/P op kidney transplant on 7/24/19 at [facility])    in interim has had kidney transplant   bp is good  Is on low fat diet and on lipitor 20 mg daily  Blood sugar and 90 day average sugar reviewed    Results for orders placed or performed in visit on 08/06/19   POC Glycosylated Hemoglobin (Hb A1C)   Result Value Ref Range    Hemoglobin A1C 6.2 %   POC Glucose Fingerstick   Result Value Ref Range    Glucose 190 (A) 70 - 130 mg/dL     Blood sugar average is good   He is utd with eye exam  No foot callus or ulceration- neuropathy is stable on gabapentin   Known nephropathy - s/p kidney transplant     Allergies   Allergen Reactions   • Sulfa Antibiotics Other (See Comments)     Patient does not remember the reaction symptoms       Current Outpatient Medications:   •  allopurinol (ZYLOPRIM) 100 MG tablet, Take 1 tablet by mouth Daily., Disp: 90 tablet, Rfl: 3  •  amLODIPine (NORVASC) 10 MG tablet, Take 1 tablet by mouth Daily., Disp: , Rfl:   •  atorvastatin (LIPITOR) 20 MG tablet, Take 1 tablet by mouth Daily., Disp: 90 tablet, Rfl: 1  •  buPROPion (WELLBUTRIN) 100 MG tablet, TAKE 1 TABLET BY MOUTH 3  TIMES A DAY, Disp: 270 tablet, Rfl: 2  •  calcitriol (ROCALTROL) 0.25 MCG capsule, Take 1 capsule by mouth Daily., Disp: , Rfl:   •  calcium acetate (PHOS BINDER,) 667 MG capsule capsule, Take 1 capsule by mouth 3 (Three) Times a Day. (Patient taking differently: Take 1,334 mg by mouth 3 (Three) Times a Day.), Disp: 180 capsule, Rfl:   •  doxazosin (CARDURA) 2 MG tablet, Take 1 tablet by mouth 2 (Two) Times a Day., Disp: 180 tablet, Rfl: 3  •  furosemide (LASIX) 40 MG tablet, Take 40 mg by mouth Every Morning., Disp: , Rfl:   •  gabapentin (NEURONTIN) 800  MG tablet, Take 0.5 tablets by mouth Daily., Disp: , Rfl:   •  glucose blood (ONE TOUCH ULTRA TEST) test strip, 1 each by Other route Daily. TEST; For: Diabetes mellitus, Disp: 50 each, Rfl: 5  •  Insulin Pen Needle (B-D ULTRAFINE III SHORT PEN) 31G X 8 MM misc, 4 (four) times a day. USE qid; For: Diabetes mellitus, Disp: , Rfl:   •  levothyroxine (SYNTHROID, LEVOTHROID) 137 MCG tablet, Take 1 tablet by mouth Daily. (Patient taking differently: Take 137 mcg by mouth Every Morning.), Disp: 90 tablet, Rfl: 1  •  nebivolol (BYSTOLIC) 10 MG tablet, Take 1 tablet by mouth Daily., Disp: 90 tablet, Rfl: 2  •  ONETOUCH DELICA LANCETS 33G misc, Use for blood sugar testing twice daily; For: Benign essential hypertension, Disp: , Rfl:   •  potassium chloride ER (K-TAB) 20 MEQ tablet controlled-release ER tablet, Take 1 tablet by mouth Daily. (Take if you take Lasix), Disp: 60 tablet, Rfl:   •  Unable to find, Take 2 each by mouth 2 (Two) Times a Day. MONI PACK 4 CAPSULES IN PACK (MULTIVITAMIN) OTC,  TAKE 2 CAPSULES BID, Disp: , Rfl:   •  venlafaxine (EFFEXOR) 75 MG tablet, Take 0.5 tablets by mouth 2 (Two) Times a Day., Disp: 45 tablet, Rfl: 3    Current Facility-Administered Medications:   •  cyanocobalamin injection 1,000 mcg, 1,000 mcg, Intramuscular, Q28 Days, Gilson Gates MD, 1,000 mcg at 07/18/19 1412  Patient Active Problem List    Diagnosis   • Right inguinal hernia [K40.90]   • OA (osteoarthritis) [M19.90]   • DM II (diabetes mellitus, type II), controlled (CMS/HCC) [E11.9]   • ESRD (end stage renal disease) on dialysis (CMS/[facility]) [N18.6, Z99.2]   • Pre-transplant evaluation for kidney transplant [Z01.818]   • Anemia of chronic renal failure, stage 5 (CMS/HCC) [N18.5, D63.1]   • Callus [L84]   • Onychomycosis [B35.1]   • Sciatica [M54.30]   • Benign essential hypertension [I10]   • Type 2 diabetes mellitus without complication, with long-term current use of insulin (CMS/[facility]) [E11.9, Z79.4]   • Diabetic  retinopathy (CMS/[facility]) [E11.319]   • Edema [R60.9]   • Mixed hyperlipidemia [E78.2]   • Hypothyroidism [E03.9]   • Morbid obesity due to excess calories (CMS/[facility]) [E66.01]   • Sleep apnea [G47.30]   • Cobalamin deficiency [E53.8]     Review of Systems   Constitutional: Positive for activity change and appetite change. Negative for chills, diaphoresis, fatigue, fever and unexpected weight change.   HENT: Negative for congestion, dental problem, drooling, ear discharge, ear pain, facial swelling, hearing loss, mouth sores, nosebleeds, postnasal drip, rhinorrhea, sinus pressure, sneezing, sore throat, tinnitus, trouble swallowing and voice change.    Eyes: Negative for photophobia, pain, discharge, redness, itching and visual disturbance.   Respiratory: Negative for apnea, cough, choking, chest tightness, shortness of breath, wheezing and stridor.    Cardiovascular: Negative for chest pain, palpitations and leg swelling.   Gastrointestinal: Negative for abdominal distention, abdominal pain, anal bleeding, blood in stool, constipation, diarrhea, nausea, rectal pain and vomiting.   Endocrine: Negative for cold intolerance, heat intolerance, polydipsia, polyphagia and polyuria.   Genitourinary: Negative for decreased urine volume, difficulty urinating, dysuria, enuresis, flank pain, frequency, genital sores, hematuria and urgency.   Musculoskeletal: Positive for arthralgias. Negative for back pain, gait problem, joint swelling, myalgias, neck pain and neck stiffness.   Skin: Negative for color change, pallor, rash and wound.   Allergic/Immunologic: Negative for environmental allergies, food allergies and immunocompromised state.   Neurological: Negative for dizziness, tremors, seizures, syncope, facial asymmetry, speech difficulty, weakness, light-headedness, numbness and headaches.   Hematological: Negative for adenopathy. Does not bruise/bleed easily.   Psychiatric/Behavioral: Negative for agitation, behavioral problems,  "confusion, decreased concentration, dysphoric mood, hallucinations, self-injury, sleep disturbance and suicidal ideas. The patient is not nervous/anxious and is not hyperactive.      Social History     Socioeconomic History   • Marital status:      Spouse name: Not on file   • Number of children: Not on file   • Years of education: Not on file   • Highest education level: Not on file   Tobacco Use   • Smoking status: Never Smoker   • Smokeless tobacco: Never Used   Substance and Sexual Activity   • Alcohol use: Yes     Alcohol/week: 0.6 oz     Types: 1 Cans of beer per week     Frequency: Never     Comment: once per month   • Drug use: No   • Sexual activity: Defer     Family History   Problem Relation Age of Onset   • No Known Problems Mother    • Diabetes Father    • Hypertension Father    • Cancer Father      /60   Pulse 62   Ht 182.9 cm (72\")   Wt 92.4 kg (203 lb 9.6 oz)   SpO2 98%   BMI 27.61 kg/m²   Physical Exam   Constitutional: He is oriented to person, place, and time. He appears well-developed and well-nourished.   HENT:   Head: Normocephalic and atraumatic.   Nose: Nose normal.   Mouth/Throat: Oropharynx is clear and moist.   Eyes: Conjunctivae, EOM and lids are normal. Pupils are equal, round, and reactive to light.   Neck: Trachea normal and normal range of motion. Neck supple. Carotid bruit is not present. No tracheal deviation present. No thyroid mass and no thyromegaly present.   Cardiovascular: Normal rate, regular rhythm and intact distal pulses. Exam reveals no gallop and no friction rub.   Murmur heard.  Pulmonary/Chest: Effort normal and breath sounds normal. No respiratory distress. He has no wheezes.   Musculoskeletal: Normal range of motion. He exhibits no edema or deformity.    Kevin had a diabetic foot exam performed today.   During the foot exam he had a monofilament test performed.    Neurological Sensory Findings - Altered hot/cold right ankle/foot discrimination and " altered hot/cold left ankle/foot discrimination. Altered sharp/dull right ankle/foot discrimination and altered sharp/dull left ankle/foot discrimination. Right ankle/foot altered proprioception and left ankle/foot altered proprioception.  Vascular Status -  His right foot exhibits normal foot vasculature  and no edema. His left foot exhibits normal foot vasculature  and no edema.  Skin Integrity  -  His right foot skin is intact.His left foot skin is intact..     Lymphadenopathy:     He has no cervical adenopathy.   Neurological: He is alert and oriented to person, place, and time. He displays abnormal reflex. No cranial nerve deficit.   Skin: Skin is warm and dry. No rash noted. No cyanosis or erythema. Nails show no clubbing.   Psychiatric: He has a normal mood and affect. His speech is normal and behavior is normal. Judgment and thought content normal. Cognition and memory are normal.   Nursing note and vitals reviewed.    Results for orders placed or performed in visit on 08/06/19   POC Glycosylated Hemoglobin (Hb A1C)   Result Value Ref Range    Hemoglobin A1C 6.2 %   POC Glucose Fingerstick   Result Value Ref Range    Glucose 190 (A) 70 - 130 mg/dL     Kevin was seen today for follow-up, prediabetes, hypertension, hypothyroidism, hyperlipidemia and chronic kidney disease.    Diagnoses and all orders for this visit:    Type 2 diabetes mellitus without complication, with long-term current use of insulin (CMS/[facility])  -     POC Glycosylated Hemoglobin (Hb A1C)  -     POC Glucose Fingerstick      Problem List Items Addressed This Visit        Cardiovascular and Mediastinum    Mixed hyperlipidemia    Relevant Orders    Lipid Panel    TSH    Benign essential hypertension    Relevant Medications    NIFEdipine XL (PROCARDIA XL) 30 MG 24 hr tablet       Endocrine    Type 2 diabetes mellitus without complication, with long-term current use of insulin (CMS/[facility]) - Primary    Relevant Medications    LANTUS SOLOSTAR 100  UNIT/ML injection pen    HUMALOG KWIKPEN 100 UNIT/ML solution pen-injector    Other Relevant Orders    POC Glycosylated Hemoglobin (Hb A1C) (Completed)    POC Glucose Fingerstick (Completed)    Comprehensive Metabolic Panel       Genitourinary    ESRD (end stage renal disease) on dialysis (CMS/[facility])     S/p kidney transplant   Doing well overall              Return in about 3 months (around 11/6/2019) for Recheck 30 min.    Anuradha Charles MD  Signed Anuradha Charles MD

## 2019-08-15 NOTE — TELEPHONE ENCOUNTER
----- Message from Anne Kuhn sent at 8/13/2019  2:27 PM EDT -----  Archana from [facility] calling for Kevin Aguero. She wants to know if Dr Gates will continue to follow him with his care. If so she will need an order for a nursing evaluation.  Archana can be reached at [phone] fax: [phone]

## 2019-08-15 NOTE — TELEPHONE ENCOUNTER
He just received a kidney transplant.  I think his nephrologists should followed this rather than me.

## 2019-09-09 NOTE — TELEPHONE ENCOUNTER
Na with [facility] called and stated that a form was faxed back to them without a provider signature. She is going to re-fax today. Na can be reached at [phone]  EX:[phone] with questions.    Thank you.

## 2019-09-13 NOTE — TELEPHONE ENCOUNTER
Patient states he is in [facility]. He states he had a kidney transplant, bowel perforation  and part of his bowel is tangled. He states he will have PT when he is released. He states he will try to keep his October appt depending all that's going on. He can be reached at [phone].

## 2019-11-04 NOTE — TELEPHONE ENCOUNTER
Mailed appt reminder.    Provera last ordered 1 x daily disp 30 w 11 refills 07/2018  Pt last annual 07/2018.  No appt on schedule, refill denied to [facility] [address]. Ynes MENDOZA RN

## 2019-11-15 NOTE — TELEPHONE ENCOUNTER
DR ROJO,    MR SPENCER'S WIFE JUST CALLED. SHE RECEIVED A NO SHOW LETTER FOR PATIENT FOR HIS APPT IN NOVEMBER. SHE WAS LETTING US KNOW THAT MR SPENCER HAS BEEN IN THE HOSPITAL SINCE AUGUST 19. WE DID TAKE THE NO SHOW OFF OF HIS LAST APPT. I DID NOT GET WHAT HOSPITAL HE IS IN, SHE STARTED CRYING, SO WE ENDED THE CONVERSATION. THANKS. RUFINA SPENCER (WIFE) CAN BE REACHED [phone]

## 2021-08-02 NOTE — PROGRESS NOTES
Kevin Aguero 62 y.o.  CC:  Follow-up; Diabetes (Type II, last eye exam was one month ago at [facility] - Dr CARLIE Lyon); Hypertension; Hypothyroidism; and Vitamin D Deficiency    [place]: Follow-up; Diabetes (Type II, last eye exam was one month ago at [facility] - Dr CARLIE Lyon); Hypertension; Hypothyroidism; and Vitamin D Deficiency    Blood sugar and 90 day average sugar reviewed  Results for orders placed or performed in visit on 11/13/17   POC Glycosylated Hemoglobin (Hb A1C)   Result Value Ref Range    Hemoglobin A1C 6.9 %   POC Glucose Fingerstick   Result Value Ref Range    Glucose 145 (A) 70 - 130 mg/dL     Average sugar is good  Blood sugars are much better overall- occ higher sugar with eating more than he should  bp elevated today-   Energy is good  Is on vitamin D supplement   He has lost about 110 lbs   He is up to date with eye exam- Dr Lyon (cataracts)  Discussed simvastatin with him     Allergies   Allergen Reactions   • Sulfa Antibiotics        Current Outpatient Prescriptions:   •  albuterol (PROVENTIL HFA;VENTOLIN HFA) 108 (90 BASE) MCG/ACT inhaler, Inhale 2 puffs Every 6 (Six) Hours As Needed for Wheezing., Disp: 1 inhaler, Rfl: 0  •  allopurinol (ZYLOPRIM) 100 MG tablet, Take 1 tablet by mouth Daily. For: Gout, Disp: 90 tablet, Rfl: 3  •  buPROPion (WELLBUTRIN) 100 MG tablet, Take 1 tablet by mouth 3 (Three) Times a Day., Disp: 270 tablet, Rfl: 3  •  BYSTOLIC 10 MG tablet, TAKE 1 TABLET DAILY, Disp: 90 tablet, Rfl: 3  •  losartan (COZAAR) 100 MG tablet, TAKE 1 TABLET DAILY, Disp: 90 tablet, Rfl: 2  •  simvastatin (ZOCOR) 40 MG tablet, Take 1 tablet by mouth Every Evening., Disp: 90 tablet, Rfl: 1  •  venlafaxine (EFFEXOR) 75 MG tablet, Take 0.5 tablets by mouth 2 (Two) Times a Day., Disp: 90 tablet, Rfl: 3  •  clotrimazole-betamethasone (LOTRISONE) 1-0.05 % cream, Apply  topically 2 (two) times a day., Disp: 45 g, Rfl: 2  •  doxazosin (CARDURA) 2 MG tablet, TAKE 1 TABLET TWICE A DAY (Patient taking  differently: Take one tablet q HS), Disp: 180 tablet, Rfl: 1  •  gabapentin (NEURONTIN) 800 MG tablet, Take 1 tablet by mouth 3 (Three) Times a Day. For: Gout (Patient taking differently: Take 800 mg by mouth 2 (Two) Times a Day. For: Gout), Disp: 270 tablet, Rfl: 1  •  glucose blood (ONE TOUCH ULTRA TEST) test strip, 4 (four) times a day. TEST; For: Diabetes mellitus, Disp: , Rfl:   •  HYDROcodone-acetaminophen (NORCO) 5-325 MG per tablet, , Disp: , Rfl:   •  Insulin Pen Needle (B-D ULTRAFINE III SHORT PEN) 31G X 8 MM misc, 4 (four) times a day. USE qid; For: Diabetes mellitus, Disp: , Rfl:   •  levothyroxine (SYNTHROID, LEVOTHROID) 100 MCG tablet, Take 1 tablet by mouth Daily. For: Hypothyroidism, Disp: 30 tablet, Rfl: 5  •  ONETOUCH DELICA LANCETS 33G misc, Use for blood sugar testing twice daily; For: Benign essential hypertension, Disp: , Rfl:   •  sildenafil (VIAGRA) 100 MG tablet, Take 1 tablet by mouth daily. 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]   • Hematuria [R31.9]   • Hyperkalemia [E87.5]   • Metabolic disorder [E88.9]     Overview Note:     syndrome - renal u/s neg for sofia       • Onychomycosis [B35.1]   • Sciatica [M54.30]   • Benign essential hypertension [I10]     Overview Note:     Impression: 12/21/2015 - increase doxazosin- goal bp 140/80 or less  Impression: 09/10/2015 - bp is good - continue to monitor creatinine  Impression: 02/20/2015 - bp is high- repeat 162/84  more edema  has recently had diuretic dose adjusted  will continue to follow and he will discuss with nephrology if no better by appt with them  Impression: 10/09/2014 - bp is higher- increase  [Foot Ulcers] : no foot ulcers amlodipine to bid; Description: edema improved.     • Reactive depression [F32.9]   • Type 2 diabetes mellitus without complication, with long-term current use of insulin [E11.9, Z79.4]     Overview Note:     Impression: 12/21/2015 - reviewed hgn a1c with him   add 4 u of humalog if blood sugar premeal is over 200   samples and refills provided  Impression: 09/10/2015 - blood sugar 185, hgn a1c 7.7%  is up to date with preventive care  goals reviewed and emphasis on better fitness overall, building a regimen of regular exercise  he will work on this  continue f/u with nephrology  Impression: 05/28/2015 - blood sugar is 213, hgn a1c 8.7%  continue current medications  checking sugars bid  is adjusting insulin for higher sugars  did not bring meter  discussed diet, weight loss, referral for compulsive eating to [facility]  update lab work   has f/u podiatry and nephrology  will schedule updated eye exam  Impression: 02/20/2015 - blood sugar 89, hgn a1c 7.0 (in acceptable range).  continue to try to increase activity and work on weight loss  biggest risk is morbid obesity  is up to date with eye exam  nephrology appt coming up  has vascular changes feet- preulcerative callus left gt toe medially.  Cautioned him about proper foot care and use of lotion and pumice stone  Impression: 10/09/2014 - reviewed hgn a1c   doing well with diet, working on weight loss surgery  is up to date with eye exam  no neuropathy  ur alb pos- sees nephrology  foot care good- saw podiatry 2 weeks ago  Impression: 07/09/2014 - blood sugar is 113, hgn a1c 7.8% (average 170 or so, down from last a1c 8.3%)  commended efforts with diet and weight loss, continue to work on losing more weight (target 30-40 lbs)  strategies for diet, exercise reviewed and download of glucometer discussed  planning to continue to work with dietician  Impression: 04/25/2014 - blood sugar 148, hgn a1c 8.3% (average 190 or so)  discussed current regimen and will transition him  to lantus + humalog for better flexibility with insulin doses  start lantus 40 u daily and humalog 1 u per 8 gm carb  grids provided and have asked him to email sugars in 1-2 weeks and gave information re insulin pump;      • Diabetic retinopathy [E11.319]     Overview Note:     Description: 10/15 worsening - Lyon referred to [facility]     • Edema [R60.9]   • Gout [M10.9]   • Mixed hyperlipidemia [E78.2]     Overview Note:     Impression: 12/21/2015 - reviewed flp - Dr Gates increased statin   more achy- discussed adding co q 10  Impression: 09/10/2015 - reviewed recent lipid panel  no changes for now  see above  Impression: 05/28/2015 - check flp  Impression: 02/20/2015 - high tg- continue current medication and repeat fasting with physical  Impression: 10/09/2014 - reviewed flp;      • Acquired hypothyroidism [E03.9]     Overview Note:     Impression: 12/21/2015 - check tfts - gave order  Impression: 09/10/2015 - tsh high normal  Impression: 05/28/2015 - check tsh  Impression: 02/20/2015 - he went off thyroid supplement ( was taking it at breakfast and then stopped because he was told he should take it before the meal).  Reinforced need for him to take this daily.  He will resume and update blood work when he has appt with Dr Gates.  Impression: 10/09/2014 - check tft;      • Morbid obesity due to excess calories [E66.01]     Overview Note:     Impression: 04/25/2014 - over 30 min counselling- discussed weight loss surgery vs diet, will try tracking calories with my fitness pal;      • Osteoarthritis [M19.90]     Overview Note:     Impression: 09/10/2015 - discussed weight loss;      • Renal insufficiency [N28.9]     Overview Note:     Impression: 09/10/2015 - continue f/u nephrology;      • Sleep apnea [G47.30]   • Cobalamin deficiency [E53.8]     Review of Systems   Constitutional: Negative for activity change, appetite change, chills, diaphoresis, fatigue, fever and unexpected weight change.   HENT:  Negative for congestion, dental problem, drooling, ear discharge, ear pain, facial swelling, hearing loss, mouth sores, nosebleeds, postnasal drip, rhinorrhea, sinus pressure, sneezing, sore throat, tinnitus, trouble swallowing and voice change.    Eyes: Negative for photophobia, pain, discharge, redness, itching and visual disturbance.   Respiratory: Positive for cough. Negative for apnea, choking, chest tightness, shortness of breath, wheezing and stridor.    Cardiovascular: Negative for chest pain, palpitations and leg swelling.   Gastrointestinal: Negative for abdominal distention, abdominal pain, anal bleeding, blood in stool, constipation, diarrhea, nausea, rectal pain and vomiting.   Endocrine: Negative for cold intolerance, heat intolerance, polydipsia, polyphagia and polyuria.   Genitourinary: Negative for decreased urine volume, difficulty urinating, dysuria, enuresis, flank pain, frequency, genital sores, hematuria and urgency.   Musculoskeletal: Negative for arthralgias, back pain, gait problem, joint swelling, myalgias, neck pain and neck stiffness.   Skin: Negative for color change, pallor, rash and wound.   Allergic/Immunologic: Negative for environmental allergies, food allergies and immunocompromised state.   Neurological: Negative for dizziness, tremors, seizures, syncope, facial asymmetry, speech difficulty, weakness, light-headedness, numbness and headaches.   Hematological: Negative for adenopathy. Does not bruise/bleed easily.   Psychiatric/Behavioral: Negative for agitation, behavioral problems, confusion, decreased concentration, dysphoric mood, hallucinations, self-injury, sleep disturbance and suicidal ideas. The patient is not nervous/anxious and is not hyperactive.      Social History     Social History   • Marital status:      Spouse name: N/A   • Number of children: N/A   • Years of education: N/A     Occupational History   • Not on file.     Social History Main Topics   •  "Smoking status: Never Smoker   • Smokeless tobacco: Not on file   • Alcohol use 0.6 oz/week     1 Cans of beer per week      Comment: 2 times per month   • Drug use: No   • Sexual activity: Defer     Other Topics Concern   • Not on file     Social History Narrative     Family History   Problem Relation Age of Onset   • No Known Problems Mother    • Diabetes Father    • Hypertension Father    • Cancer Father      /80  Pulse 56  Ht 72\" (182.9 cm)  Wt 267 lb (121 kg)  SpO2 99%  BMI 36.21 kg/m2  Physical Exam   Constitutional: He is oriented to person, place, and time. He appears well-developed and well-nourished.   HENT:   Head: Normocephalic and atraumatic.   Nose: Nose normal.   Mouth/Throat: Oropharynx is clear and moist.   Eyes: Conjunctivae, EOM and lids are normal. Pupils are equal, round, and reactive to light.   Neck: Trachea normal and normal range of motion. Neck supple. Carotid bruit is not present. No tracheal deviation present. No thyroid mass and no thyromegaly present.   Cardiovascular: Normal rate, regular rhythm, normal heart sounds and intact distal pulses.  Exam reveals no gallop and no friction rub.    No murmur heard.  Pulmonary/Chest: Effort normal and breath sounds normal. No respiratory distress. He has no wheezes.   Musculoskeletal: Normal range of motion. He exhibits no edema or deformity.    Kevin had a diabetic foot exam performed today.   During the foot exam he had a monofilament test performed.    Neurological Sensory Findings - Altered hot/cold right ankle/foot discrimination and altered hot/cold left ankle/foot discrimination. Altered sharp/dull right ankle/foot discrimination and altered sharp/dull left ankle/foot discrimination. Right ankle/foot altered proprioception and left ankle/foot altered proprioception.    Vascular Status -  His exam exhibits right foot vasculature normal. His exam exhibits no right foot edema. His exam exhibits left foot vasculature normal. His " exam exhibits no left foot edema.   Skin Integrity  -  His right foot skin is intact.     Kevin 's left foot skin is intact. .  Lymphadenopathy:     He has no cervical adenopathy.   Neurological: He is alert and oriented to person, place, and time. He has normal reflexes. He displays normal reflexes. No cranial nerve deficit.   Skin: Skin is warm and dry. No rash noted. No cyanosis or erythema. Nails show no clubbing.   Psychiatric: He has a normal mood and affect. His speech is normal and behavior is normal. Judgment and thought content normal. Cognition and memory are normal.   Nursing note and vitals reviewed.    Results for orders placed or performed in visit on 08/21/17   POC Glycosylated Hemoglobin (Hb A1C)   Result Value Ref Range    Hemoglobin A1C 7.3 %   POC Glucose Fingerstick   Result Value Ref Range    Glucose 193 (A) 70 - 130 mg/dL     Kevin was seen today for follow-up, diabetes, hypertension and hypothyroidism.    Diagnoses and all orders for this visit:    Type 2 diabetes mellitus without complication, without long-term current use of insulin  -     POC Glycosylated Hemoglobin (Hb A1C)  -     POC Glucose Fingerstick      Problem List Items Addressed This Visit        Cardiovascular and Mediastinum    Benign essential hypertension     High bp - add back amlodipine- continue doxazosin, cozaar, bystolic          Relevant Medications    amLODIPine (NORVASC) 5 MG tablet    Other Relevant Orders    TSH    Vitamin D 25 Hydroxy    Mixed hyperlipidemia     Check flp          Relevant Orders    Lipid Panel       Respiratory    Cough     Check cxr- clear sputum   Signs and symptoms of infection reviewed          Relevant Orders    XR Chest PA & Lateral       Endocrine    Type 2 diabetes mellitus without complication, with long-term current use of insulin     Blood sugar and 90 day average sugar discussed  Results for orders placed or performed in visit on 11/13/17   POC Glycosylated Hemoglobin (Hb A1C)   Result  Value Ref Range    Hemoglobin A1C 6.9 %   POC Glucose Fingerstick   Result Value Ref Range    Glucose 145 (A) 70 - 130 mg/dL     He is up to date with eye exam  Neuropathy with rash left foot - discussed foot care  Ur alb due today   No low sugars   Commended weight loss  Discussed insulin storage and disposal   Continue current medications   F/u 4 mons th         Relevant Orders    Vitamin D 25 Hydroxy    Diabetic retinopathy     Stable via Dr Lyon- no rx   Cataracts          Relevant Orders    Vitamin D 25 Hydroxy    Acquired hypothyroidism     Check tsh          Relevant Orders    Vitamin D 25 Hydroxy      Other Visit Diagnoses     Type 2 diabetes mellitus without complication, without long-term current use of insulin    -  Primary    Relevant Orders    POC Glycosylated Hemoglobin (Hb A1C) (Completed)    POC Glucose Fingerstick (Completed)    Microalbumin / Creatinine Urine Ratio - Urine, Clean Catch    Vitamin D 25 Hydroxy        Return in about 4 months (around 3/13/2018) for Recheck 30 min .    Valentina Rodriguez MA  Signed Anuradha Charles MD

## (undated) DEVICE — GLV SURG SENSICARE MICRO PF LF 7.5 STRL

## (undated) DEVICE — DRN PENRS 1/2X18IN LTX

## (undated) DEVICE — INTRAOPERATIVE COVER KIT, 10 PACK: Brand: SITE-RITE

## (undated) DEVICE — PK MINOR SPLT 10

## (undated) DEVICE — DRSNG SURESITE WNDW 2.38X2.75

## (undated) DEVICE — 3M(TM) TEGADERM(TM) IV TRANSPARENT FILM DRESSING WITH BORDER 1655: Brand: 3M™ TEGADERM™

## (undated) DEVICE — KT CATH TAL PALINDROME RUBY 14.5F23CM

## (undated) DEVICE — ELECTRD BLD 1P STD SS 3/32X2.44IN

## (undated) DEVICE — ADHESIVE ISLAND DRESSING: Brand: TELFA

## (undated) DEVICE — GLV SURG SENSICARE MICRO PF LF 6.5 STRL

## (undated) DEVICE — SUT ETHLN 2/0 PS 18IN 585H

## (undated) DEVICE — ADHS LIQ MASTISOL 2/3ML

## (undated) DEVICE — 3M™ STERI-STRIP™ REINFORCED ADHESIVE SKIN CLOSURES, R1547, 1/2 IN X 4 IN (12 MM X 100 MM), 6 STRIPS/ENVELOPE: Brand: 3M™ STERI-STRIP™

## (undated) DEVICE — APPL CHLORAPREP W/TINT 26ML BLU

## (undated) DEVICE — ENCORE® LATEX MICRO SIZE 7.5, STERILE LATEX POWDER-FREE SURGICAL GLOVE: Brand: ENCORE

## (undated) DEVICE — DECANT BG O JET

## (undated) DEVICE — MONOFILAMENT POLYBUTESTER: Brand: NOVAFIL

## (undated) DEVICE — 3M™ TEGADERM™ CHG DRESSING 25/CARTON 4 CARTONS/CASE 1658: Brand: TEGADERM™

## (undated) DEVICE — SNAP KOVER: Brand: UNBRANDED

## (undated) DEVICE — PAD HEMOST NEPTUNE 2X2IN

## (undated) DEVICE — SUT MNCRYL PLS ANTIB UD 4/0 PS2 18IN

## (undated) DEVICE — GOWN,NON-REINFORCED,SIRUS,SET IN SLV,XL: Brand: MEDLINE

## (undated) DEVICE — GLV SURG BIOGEL LTX PF 7 1/2

## (undated) DEVICE — GAUZE,SPONGE,4"X4",16PLY,XRAY,STRL,LF: Brand: MEDLINE

## (undated) DEVICE — NDL HYPO ECLPS SFTY 18G 1 1/2IN

## (undated) DEVICE — CAUTERY TIP POLISHER: Brand: DEVON

## (undated) DEVICE — ANTIBACTERIAL UNDYED BRAIDED (POLYGLACTIN 910), SYNTHETIC ABSORBABLE SUTURE: Brand: COATED VICRYL

## (undated) DEVICE — COVER,LIGHT HANDLE,FLX,1/PK: Brand: MEDLINE INDUSTRIES, INC.

## (undated) DEVICE — SYR LUERLOK 20CC

## (undated) DEVICE — DRSNG SURESITE WNDW 4X4.5

## (undated) DEVICE — BANDAGE,GAUZE,BULKEE II,4.5"X4.1YD,STRL: Brand: MEDLINE

## (undated) DEVICE — DRSNG TELFA PAD NONADH STR 1S 3X8IN